# Patient Record
Sex: FEMALE | Employment: OTHER | URBAN - NONMETROPOLITAN AREA
[De-identification: names, ages, dates, MRNs, and addresses within clinical notes are randomized per-mention and may not be internally consistent; named-entity substitution may affect disease eponyms.]

---

## 2017-12-07 ENCOUNTER — DOCTOR'S OFFICE (OUTPATIENT)
Dept: URBAN - NONMETROPOLITAN AREA CLINIC 1 | Facility: CLINIC | Age: 42
Setting detail: OPHTHALMOLOGY
End: 2017-12-07
Payer: COMMERCIAL

## 2017-12-07 ENCOUNTER — OPTICAL OFFICE (OUTPATIENT)
Dept: URBAN - NONMETROPOLITAN AREA CLINIC 4 | Facility: CLINIC | Age: 42
Setting detail: OPHTHALMOLOGY
End: 2017-12-07
Payer: COMMERCIAL

## 2017-12-07 ENCOUNTER — RX ONLY (RX ONLY)
Age: 42
End: 2017-12-07

## 2017-12-07 DIAGNOSIS — H52.4: ICD-10-CM

## 2017-12-07 DIAGNOSIS — H52.223: ICD-10-CM

## 2017-12-07 PROCEDURE — 92014 COMPRE OPH EXAM EST PT 1/>: CPT | Performed by: OPTOMETRIST

## 2017-12-07 PROCEDURE — V2202 LENS SPHERE BIFOCAL 7.12-20.: HCPCS | Performed by: OPTOMETRIST

## 2017-12-07 PROCEDURE — V2020 VISION SVCS FRAMES PURCHASES: HCPCS | Performed by: OPTOMETRIST

## 2017-12-07 PROCEDURE — 92015 DETERMINE REFRACTIVE STATE: CPT | Performed by: OPTOMETRIST

## 2017-12-07 PROCEDURE — V2784 LENS POLYCARB OR EQUAL: HCPCS | Performed by: OPTOMETRIST

## 2017-12-07 ASSESSMENT — REFRACTION_CURRENTRX
OS_OVR_VA: 20/
OD_OVR_VA: 20/
OD_OVR_VA: 20/
OS_OVR_VA: 20/
OD_OVR_VA: 20/
OS_OVR_VA: 20/

## 2017-12-07 ASSESSMENT — REFRACTION_OUTSIDERX
OU_VA: 20/25
OD_CYLINDER: -0.50
OS_SPHERE: -1.00
OS_VA2: 20/25
OS_VA1: 20/25
OD_ADD: +1.50
OS_AXIS: 135
OD_AXIS: 135
OD_SPHERE: -1.00
OS_ADD: +1.50
OD_VA1: 20/25
OS_CYLINDER: -0.50
OD_VA2: 20/25
OD_VA3: 20/
OS_VA3: 20/

## 2017-12-07 ASSESSMENT — REFRACTION_MANIFEST
OS_VA2: 20/
OU_VA: 20/
OD_VA1: 20/
OD_VA3: 20/
OS_VA2: 20/
OS_VA3: 20/
OD_VA2: 20/
OU_VA: 20/
OS_VA3: 20/
OD_VA2: 20/
OS_VA1: 20/
OD_VA1: 20/
OS_VA1: 20/
OD_VA3: 20/

## 2017-12-07 ASSESSMENT — VISUAL ACUITY
OD_BCVA: 20/70-1
OS_BCVA: 20/80-1

## 2017-12-07 ASSESSMENT — REFRACTION_AUTOREFRACTION
OS_SPHERE: -1.50
OD_SPHERE: -1.50
OS_CYLINDER: -0.50
OS_AXIS: 138
OD_CYLINDER: -0.50
OD_AXIS: 136

## 2017-12-07 ASSESSMENT — CONFRONTATIONAL VISUAL FIELD TEST (CVF)
OS_FINDINGS: FULL
OD_FINDINGS: FULL

## 2017-12-07 ASSESSMENT — SPHEQUIV_DERIVED
OS_SPHEQUIV: -1.75
OD_SPHEQUIV: -1.75

## 2020-09-23 ENCOUNTER — DOCTOR'S OFFICE (OUTPATIENT)
Dept: URBAN - NONMETROPOLITAN AREA CLINIC 1 | Facility: CLINIC | Age: 45
Setting detail: OPHTHALMOLOGY
End: 2020-09-23
Payer: COMMERCIAL

## 2020-09-23 VITALS — HEIGHT: 55 IN

## 2020-09-23 DIAGNOSIS — H52.4: ICD-10-CM

## 2020-09-23 DIAGNOSIS — E11.3293: ICD-10-CM

## 2020-09-23 PROCEDURE — 92134 CPTRZ OPH DX IMG PST SGM RTA: CPT | Performed by: OPTOMETRIST

## 2020-09-23 PROCEDURE — 92014 COMPRE OPH EXAM EST PT 1/>: CPT | Performed by: OPTOMETRIST

## 2020-09-23 ASSESSMENT — CONFRONTATIONAL VISUAL FIELD TEST (CVF)
OD_FINDINGS: FULL
OS_FINDINGS: FULL

## 2020-09-24 ASSESSMENT — REFRACTION_MANIFEST
OS_ADD: +1.50
OS_CYLINDER: -0.50
OD_AXIS: 135
OD_VA2: 20/25
OS_AXIS: 135
OD_ADD: +1.50
OD_CYLINDER: -0.50
OS_VA1: 20/25
OS_SPHERE: -1.00
OS_VA2: 20/25
OU_VA: 20/25
OD_SPHERE: -1.00
OD_VA1: 20/25

## 2020-09-24 ASSESSMENT — REFRACTION_CURRENTRX
OD_AXIS: 138
OS_VPRISM_DIRECTION: BF
OS_SPHERE: -1.00
OS_OVR_VA: 20/
OS_CYLINDER: -0.50
OD_VPRISM_DIRECTION: BF
OD_OVR_VA: 20/
OD_ADD: +1.75
OD_CYLINDER: -0.50
OS_AXIS: 135
OD_SPHERE: -1.00
OS_ADD: +1.75

## 2020-09-24 ASSESSMENT — SPHEQUIV_DERIVED
OD_SPHEQUIV: -1.25
OS_SPHEQUIV: -1.25
OS_SPHEQUIV: -1.75
OD_SPHEQUIV: -1.25

## 2020-09-24 ASSESSMENT — REFRACTION_AUTOREFRACTION
OD_CYLINDER: -1.00
OS_AXIS: 138
OD_SPHERE: -0.75
OS_SPHERE: -1.50
OS_CYLINDER: -0.50
OD_AXIS: 006

## 2020-09-24 ASSESSMENT — VISUAL ACUITY
OD_BCVA: 20/40-1
OS_BCVA: 20/100-1

## 2020-12-23 ENCOUNTER — OPTICAL OFFICE (OUTPATIENT)
Dept: URBAN - NONMETROPOLITAN AREA CLINIC 4 | Facility: CLINIC | Age: 45
Setting detail: OPHTHALMOLOGY
End: 2020-12-23
Payer: COMMERCIAL

## 2020-12-23 ENCOUNTER — DOCTOR'S OFFICE (OUTPATIENT)
Dept: URBAN - NONMETROPOLITAN AREA CLINIC 1 | Facility: CLINIC | Age: 45
Setting detail: OPHTHALMOLOGY
End: 2020-12-23
Payer: COMMERCIAL

## 2020-12-23 VITALS — HEIGHT: 55 IN

## 2020-12-23 DIAGNOSIS — H52.223: ICD-10-CM

## 2020-12-23 DIAGNOSIS — H52.4: ICD-10-CM

## 2020-12-23 PROCEDURE — V2784 LENS POLYCARB OR EQUAL: HCPCS | Performed by: OPTOMETRIST

## 2020-12-23 PROCEDURE — V2203 LENS SPHCYL BIFOCAL 4.00D/.1: HCPCS | Performed by: OPTOMETRIST

## 2020-12-23 PROCEDURE — V2020 VISION SVCS FRAMES PURCHASES: HCPCS | Performed by: OPTOMETRIST

## 2020-12-23 PROCEDURE — 92015 DETERMINE REFRACTIVE STATE: CPT | Performed by: OPTOMETRIST

## 2020-12-23 PROCEDURE — V2202 LENS SPHERE BIFOCAL 7.12-20.: HCPCS | Performed by: OPTOMETRIST

## 2020-12-23 ASSESSMENT — REFRACTION_MANIFEST
OS_CYLINDER: -0.50
OD_ADD: +2.00
OS_AXIS: 015
OD_CYLINDER: -0.50
OS_ADD: +2.00
OU_VA: 20/25
OS_SPHERE: -0.75
OS_VA1: 20/30
OD_VA1: 20/40
OD_SPHERE: -1.00
OD_AXIS: 045
OS_VA2: 20/30
OD_VA2: 20/40

## 2020-12-23 ASSESSMENT — REFRACTION_CURRENTRX
OS_OVR_VA: 20/
OS_ADD: +1.75
OS_VPRISM_DIRECTION: BF
OD_ADD: +1.75
OD_AXIS: 138
OD_OVR_VA: 20/
OS_SPHERE: -1.00
OS_CYLINDER: -0.50
OD_VPRISM_DIRECTION: BF
OD_SPHERE: -1.00
OD_CYLINDER: -0.50
OS_AXIS: 135

## 2020-12-23 ASSESSMENT — VISUAL ACUITY
OS_BCVA: 20/100-1
OD_BCVA: 20/40-1

## 2020-12-23 ASSESSMENT — SPHEQUIV_DERIVED
OS_SPHEQUIV: -0.5
OS_SPHEQUIV: -1
OD_SPHEQUIV: -1.25
OD_SPHEQUIV: -1

## 2020-12-23 ASSESSMENT — REFRACTION_AUTOREFRACTION
OS_SPHERE: -0.25
OD_CYLINDER: -1.00
OD_SPHERE: -0.50
OD_AXIS: 067
OS_AXIS: 013
OS_CYLINDER: -0.50

## 2021-03-23 ENCOUNTER — DOCTOR'S OFFICE (OUTPATIENT)
Dept: URBAN - NONMETROPOLITAN AREA CLINIC 1 | Facility: CLINIC | Age: 46
Setting detail: OPHTHALMOLOGY
End: 2021-03-23
Payer: COMMERCIAL

## 2021-03-23 VITALS — HEIGHT: 55 IN

## 2021-03-23 DIAGNOSIS — E11.3293: ICD-10-CM

## 2021-03-23 PROCEDURE — 92134 CPTRZ OPH DX IMG PST SGM RTA: CPT | Performed by: OPTOMETRIST

## 2021-03-23 PROCEDURE — 92014 COMPRE OPH EXAM EST PT 1/>: CPT | Performed by: OPTOMETRIST

## 2021-03-23 ASSESSMENT — REFRACTION_MANIFEST
OS_ADD: +2.00
OD_VA1: 20/40
OS_VA2: 20/30
OS_AXIS: 015
OD_VA2: 20/40
OU_VA: 20/25
OD_SPHERE: -1.00
OS_CYLINDER: -0.50
OD_ADD: +2.00
OD_CYLINDER: -0.50
OD_AXIS: 045
OS_VA1: 20/30
OS_SPHERE: -0.75

## 2021-03-23 ASSESSMENT — REFRACTION_CURRENTRX
OS_AXIS: 135
OD_OVR_VA: 20/
OS_CYLINDER: -0.50
OD_ADD: +2.00
OS_ADD: +2.00
OD_AXIS: 138
OS_OVR_VA: 20/
OS_SPHERE: -0.50
OD_CYLINDER: -0.50
OD_SPHERE: -1.00
OD_VPRISM_DIRECTION: BF
OS_VPRISM_DIRECTION: BF

## 2021-03-23 ASSESSMENT — REFRACTION_AUTOREFRACTION
OS_AXIS: 005
OD_SPHERE: -0.75
OS_CYLINDER: -1.00
OD_AXIS: 146
OS_SPHERE: -0.25
OD_CYLINDER: -0.75

## 2021-03-23 ASSESSMENT — VISUAL ACUITY
OD_BCVA: 20/50
OS_BCVA: 20/80-1

## 2021-03-23 ASSESSMENT — SPHEQUIV_DERIVED
OS_SPHEQUIV: -0.75
OD_SPHEQUIV: -1.125
OS_SPHEQUIV: -1
OD_SPHEQUIV: -1.25

## 2021-03-23 ASSESSMENT — CONFRONTATIONAL VISUAL FIELD TEST (CVF)
OD_FINDINGS: FULL
OS_FINDINGS: FULL

## 2021-09-24 ENCOUNTER — DOCTOR'S OFFICE (OUTPATIENT)
Dept: URBAN - NONMETROPOLITAN AREA CLINIC 1 | Facility: CLINIC | Age: 46
Setting detail: OPHTHALMOLOGY
End: 2021-09-24
Payer: COMMERCIAL

## 2021-09-24 VITALS — HEIGHT: 55 IN

## 2021-09-24 DIAGNOSIS — E11.3293: ICD-10-CM

## 2021-09-24 DIAGNOSIS — H25.013: ICD-10-CM

## 2021-09-24 PROBLEM — H52.4 MYOPIA OU WITH PRESBYOPIA ; BOTH EYES: Status: ACTIVE | Noted: 2017-12-07

## 2021-09-24 PROCEDURE — 92014 COMPRE OPH EXAM EST PT 1/>: CPT | Performed by: OPTOMETRIST

## 2021-09-24 PROCEDURE — 92134 CPTRZ OPH DX IMG PST SGM RTA: CPT | Performed by: OPTOMETRIST

## 2021-09-24 ASSESSMENT — VISUAL ACUITY
OS_BCVA: 20/40
OD_BCVA: 20/30

## 2021-09-24 ASSESSMENT — REFRACTION_CURRENTRX
OS_AXIS: 135
OD_ADD: +2.00
OS_VPRISM_DIRECTION: BF
OD_SPHERE: -1.00
OD_VPRISM_DIRECTION: BF
OD_OVR_VA: 20/
OD_AXIS: 138
OS_ADD: +2.00
OD_CYLINDER: -0.50
OS_CYLINDER: -0.50
OS_SPHERE: -0.50
OS_OVR_VA: 20/

## 2021-09-24 ASSESSMENT — REFRACTION_MANIFEST
OD_SPHERE: -1.00
OS_CYLINDER: -0.50
OS_ADD: +2.00
OD_ADD: +2.00
OS_AXIS: 015
OD_AXIS: 045
OD_VA2: 20/40
OU_VA: 20/25
OD_VA1: 20/40
OS_VA1: 20/30
OD_CYLINDER: -0.50
OS_VA2: 20/30
OS_SPHERE: -0.75

## 2021-09-24 ASSESSMENT — CONFRONTATIONAL VISUAL FIELD TEST (CVF)
OS_FINDINGS: FULL
OD_FINDINGS: FULL

## 2021-09-24 ASSESSMENT — SPHEQUIV_DERIVED
OD_SPHEQUIV: -1.5
OD_SPHEQUIV: -1.25
OS_SPHEQUIV: -1.375
OS_SPHEQUIV: -1

## 2021-09-24 ASSESSMENT — REFRACTION_AUTOREFRACTION
OS_AXIS: 178
OD_AXIS: 172
OS_SPHERE: -1.00
OD_SPHERE: -1.00
OS_CYLINDER: -0.75
OD_CYLINDER: -1.00

## 2021-09-24 ASSESSMENT — TONOMETRY
OS_IOP_MMHG: 16
OD_IOP_MMHG: 16

## 2023-01-03 PROBLEM — R73.03 PRE-DIABETES: Status: ACTIVE | Noted: 2017-12-14

## 2023-01-03 PROBLEM — Z86.19 HEPATITIS C VIRUS INFECTION CURED AFTER ANTIVIRAL DRUG THERAPY: Status: ACTIVE | Noted: 2021-05-12

## 2023-01-03 PROBLEM — F20.9 SCHIZOPHRENIA (HCC): Status: ACTIVE | Noted: 2022-07-13

## 2023-01-03 PROBLEM — E66.01 SEVERE OBESITY WITH BODY MASS INDEX (BMI) OF 35.0 TO 39.9 WITH SERIOUS COMORBIDITY (HCC): Status: ACTIVE | Noted: 2019-01-07

## 2023-01-03 PROBLEM — N17.9 ACUTE KIDNEY INJURY (HCC): Status: ACTIVE | Noted: 2022-05-31

## 2023-01-07 ENCOUNTER — HOSPITAL ENCOUNTER (EMERGENCY)
Facility: HOSPITAL | Age: 48
Discharge: HOME | End: 2023-01-08
Attending: EMERGENCY MEDICINE

## 2023-01-07 DIAGNOSIS — F10.929 ALCOHOL INTOXICATION (HCC): ICD-10-CM

## 2023-01-07 DIAGNOSIS — E87.1 HYPONATREMIA: ICD-10-CM

## 2023-01-07 DIAGNOSIS — R10.9 ABDOMINAL PAIN: ICD-10-CM

## 2023-01-07 DIAGNOSIS — F10.10 ALCOHOL ABUSE: Primary | ICD-10-CM

## 2023-01-07 LAB
ALBUMIN SERPL BCP-MCNC: 3.8 G/DL (ref 3.5–5)
ALP SERPL-CCNC: 130 U/L (ref 34–104)
ALT SERPL W P-5'-P-CCNC: 7 U/L (ref 7–52)
ANION GAP SERPL CALCULATED.3IONS-SCNC: 11 MMOL/L (ref 4–13)
AST SERPL W P-5'-P-CCNC: 14 U/L (ref 13–39)
BASOPHILS # BLD AUTO: 0.14 THOUSANDS/ÂΜL (ref 0–0.1)
BASOPHILS NFR BLD AUTO: 1 % (ref 0–1)
BILIRUB SERPL-MCNC: 0.3 MG/DL (ref 0.2–1)
BUN SERPL-MCNC: 14 MG/DL (ref 5–25)
CALCIUM SERPL-MCNC: 8.8 MG/DL (ref 8.4–10.2)
CHLORIDE SERPL-SCNC: 97 MMOL/L (ref 96–108)
CO2 SERPL-SCNC: 20 MMOL/L (ref 21–32)
CREAT SERPL-MCNC: 1.05 MG/DL (ref 0.6–1.3)
EOSINOPHIL # BLD AUTO: 0.27 THOUSAND/ÂΜL (ref 0–0.61)
EOSINOPHIL NFR BLD AUTO: 2 % (ref 0–6)
ERYTHROCYTE [DISTWIDTH] IN BLOOD BY AUTOMATED COUNT: 14.4 % (ref 11.6–15.1)
ETHANOL SERPL-MCNC: 120 MG/DL
ETHANOL SERPL-MCNC: 289 MG/DL
ETHANOL SERPL-MCNC: <10 MG/DL
GFR SERPL CREATININE-BSD FRML MDRD: 62 ML/MIN/1.73SQ M
GLUCOSE SERPL-MCNC: 140 MG/DL (ref 65–140)
HCT VFR BLD AUTO: 35 % (ref 34.8–46.1)
HGB BLD-MCNC: 11.5 G/DL (ref 11.5–15.4)
IMM GRANULOCYTES # BLD AUTO: 0.03 THOUSAND/UL (ref 0–0.2)
IMM GRANULOCYTES NFR BLD AUTO: 0 % (ref 0–2)
LIPASE SERPL-CCNC: 31 U/L (ref 11–82)
LYMPHOCYTES # BLD AUTO: 4.07 THOUSANDS/ÂΜL (ref 0.6–4.47)
LYMPHOCYTES NFR BLD AUTO: 34 % (ref 14–44)
MCH RBC QN AUTO: 28.2 PG (ref 26.8–34.3)
MCHC RBC AUTO-ENTMCNC: 32.9 G/DL (ref 31.4–37.4)
MCV RBC AUTO: 86 FL (ref 82–98)
MONOCYTES # BLD AUTO: 0.85 THOUSAND/ÂΜL (ref 0.17–1.22)
MONOCYTES NFR BLD AUTO: 7 % (ref 4–12)
NEUTROPHILS # BLD AUTO: 6.52 THOUSANDS/ÂΜL (ref 1.85–7.62)
NEUTS SEG NFR BLD AUTO: 56 % (ref 43–75)
NRBC BLD AUTO-RTO: 0 /100 WBCS
PLATELET # BLD AUTO: 384 THOUSANDS/UL (ref 149–390)
PMV BLD AUTO: 11.1 FL (ref 8.9–12.7)
POTASSIUM SERPL-SCNC: 3.5 MMOL/L (ref 3.5–5.3)
PROT SERPL-MCNC: 6.8 G/DL (ref 6.4–8.4)
RBC # BLD AUTO: 4.08 MILLION/UL (ref 3.81–5.12)
SODIUM SERPL-SCNC: 128 MMOL/L (ref 135–147)
WBC # BLD AUTO: 11.88 THOUSAND/UL (ref 4.31–10.16)

## 2023-01-07 RX ORDER — SODIUM CHLORIDE 9 MG/ML
125 INJECTION, SOLUTION INTRAVENOUS CONTINUOUS
Status: DISCONTINUED | OUTPATIENT
Start: 2023-01-07 | End: 2023-01-07

## 2023-01-07 RX ADMIN — SODIUM CHLORIDE 125 ML/HR: 0.9 INJECTION, SOLUTION INTRAVENOUS at 02:11

## 2023-01-07 NOTE — ED NOTES
Crisis called [facility] [phone] requesting inpatient Rehab for pt  Spoke with Akira LUU who stated there are open beds available and to send an email requesting rehab for pt  Email sent while talking with Debora Cazares confirmed receiving email  Will review with team and follow up with pt while in ED

## 2023-01-07 NOTE — ED NOTES
Crisis met with pt to complete Crisis Intake and Safety Risk Assessment  Pt is a 50year old female with a history of Bipolar and Depression who presents in the emergency room via EMS  Pt stated stated she drank 8 cans of beer due to depression, and is known to be alcoholic  Pt stated she normally drinks a case of beer per day  She denies taking any psychotropic medications, and said she does not see a psychiatrist   She lives with her boyfriend who supports her financially  She said she was given monthly disability checks but no longer receives them  She denies inpatient mental health treatment, denies SI/HI/AVH or thoughts to self harm  Pt states last rehab admission was a few months ago at [facility]  Crisis and pt discussed treatment options  Pt is agreeable to go to rehab

## 2023-01-07 NOTE — ED NOTES
Advised Jory Reeder, that patient is at legal alcohol limit for evaluation       Parisa Pena RN  01/07/23 7706

## 2023-01-07 NOTE — ED PROVIDER NOTES
History  Chief Complaint   Patient presents with   • Detox Evaluation     Pt drank 8 cans of beer  Drinks daily, wants rehab     26-year-old female with a history of alcohol abuse presents via EMS for evaluation of abdominal pain  Patient states she normally drinks a case of beer per day  She states tonight she drank 8 cans of beer and started with some abdominal pain  The abdominal pain is diffuse in nature and associated with nausea and vomiting  She states she typically gets abdominal pain when she drinks  Denies any prior history of any abdominal surgeries  No headache, fever, chills, chest pain, cough, or shortness of breath  No urinary symptoms  Patient states she may be interested in alcohol rehab  Patient does appear to be slightly intoxicated at this time  No reported trauma or falls  None       History reviewed  No pertinent past medical history  History reviewed  No pertinent surgical history  History reviewed  No pertinent family history  I have reviewed and agree with the history as documented  E-Cigarette/Vaping     E-Cigarette/Vaping Substances     Social History     Tobacco Use   • Smoking status: Every Day     Packs/day: 0 25     Years: 30 00     Pack years: 7 50     Types: Cigarettes   Substance Use Topics   • Alcohol use: Yes     Alcohol/week: 40 0 standard drinks     Types: 40 Cans of beer per week   • Drug use: Never       Review of Systems   Constitutional: Negative for chills and fever  HENT: Negative for ear pain and sore throat  Eyes: Negative for pain and visual disturbance  Respiratory: Negative for cough and shortness of breath  Cardiovascular: Negative for chest pain and palpitations  Gastrointestinal: Positive for abdominal pain, nausea and vomiting  Genitourinary: Negative for dysuria and hematuria  Musculoskeletal: Negative for arthralgias and back pain  Skin: Negative for color change and rash     Neurological: Negative for seizures and syncope  All other systems reviewed and are negative  Physical Exam  Physical Exam  Vitals and nursing note reviewed  Constitutional:       General: She is not in acute distress  Appearance: She is well-developed  HENT:      Head: Normocephalic and atraumatic  Right Ear: External ear normal       Left Ear: External ear normal       Nose: Nose normal       Mouth/Throat:      Mouth: Mucous membranes are moist    Eyes:      Extraocular Movements: Extraocular movements intact  Conjunctiva/sclera: Conjunctivae normal       Pupils: Pupils are equal, round, and reactive to light  Cardiovascular:      Rate and Rhythm: Normal rate and regular rhythm  Heart sounds: No murmur heard  Pulmonary:      Effort: Pulmonary effort is normal  No respiratory distress  Breath sounds: Normal breath sounds  Abdominal:      General: Abdomen is flat  Bowel sounds are normal       Palpations: Abdomen is soft  Tenderness: There is no abdominal tenderness  There is no guarding or rebound  Musculoskeletal:         General: No swelling or deformity  Normal range of motion  Cervical back: Normal range of motion and neck supple  Skin:     General: Skin is warm and dry  Capillary Refill: Capillary refill takes less than 2 seconds  Neurological:      General: No focal deficit present  Mental Status: She is alert and oriented to person, place, and time     Psychiatric:         Mood and Affect: Mood normal          Behavior: Behavior normal          Vital Signs  ED Triage Vitals   Temperature Pulse Respirations Blood Pressure SpO2   01/07/23 0210 01/07/23 0157 01/07/23 0157 01/07/23 0157 01/07/23 0157   (!) 96 7 °F (35 9 °C) 102 19 (!) 162/102 97 %      Temp Source Heart Rate Source Patient Position - Orthostatic VS BP Location FiO2 (%)   01/07/23 0210 01/07/23 0157 01/07/23 0157 01/07/23 0157 --   Tympanic Monitor Lying Left arm       Pain Score       01/07/23 0157       10 - Worst Possible Pain           Vitals:    01/07/23 0157 01/07/23 0200 01/07/23 0400 01/07/23 0430   BP: (!) 162/102 (!) 162/102 120/73 112/86   Pulse: 102 99 99 100   Patient Position - Orthostatic VS: Lying Lying Lying Lying         Visual Acuity      ED Medications  Medications   sodium chloride 0 9 % infusion (125 mL/hr Intravenous New Bag 1/7/23 0211)       Diagnostic Studies  Results Reviewed     Procedure Component Value Units Date/Time    Ethanol [[id number]]  (Abnormal) Collected: 01/07/23 0213    Lab Status: Final result Specimen: Blood from Arm, Right Updated: 01/07/23 0239     Ethanol Lvl 289 mg/dL     Comprehensive metabolic panel [[id number]]  (Abnormal) Collected: 01/07/23 0213    Lab Status: Final result Specimen: Blood from Arm, Right Updated: 01/07/23 0237     Sodium 128 mmol/L      Potassium 3 5 mmol/L      Chloride 97 mmol/L      CO2 20 mmol/L      ANION GAP 11 mmol/L      BUN 14 mg/dL      Creatinine 1 05 mg/dL      Glucose 140 mg/dL      Calcium 8 8 mg/dL      AST 14 U/L      ALT 7 U/L      Alkaline Phosphatase 130 U/L      Total Protein 6 8 g/dL      Albumin 3 8 g/dL      Total Bilirubin 0 30 mg/dL      eGFR 62 ml/min/1 73sq m     Narrative:      [place] guidelines for Chronic Kidney Disease (CKD):   •  Stage 1 with normal or high GFR (GFR > 90 mL/min/1 73 square meters)  •  Stage 2 Mild CKD (GFR = 60-89 mL/min/1 73 square meters)  •  Stage 3A Moderate CKD (GFR = 45-59 mL/min/1 73 square meters)  •  Stage 3B Moderate CKD (GFR = 30-44 mL/min/1 73 square meters)  •  Stage 4 Severe CKD (GFR = 15-29 mL/min/1 73 square meters)  •  Stage 5 End Stage CKD (GFR <15 mL/min/1 73 square meters)  Note: GFR calculation is accurate only with a steady state creatinine    Lipase [[id number]]  (Normal) Collected: 01/07/23 0213    Lab Status: Final result Specimen: Blood from Arm, Right Updated: 01/07/23 0237     Lipase 31 u/L     CBC and differential [[id number]]  (Abnormal) Collected: 01/07/23 7797    Lab Status: Final result Specimen: Blood from Arm, Right Updated: 01/07/23 0217     WBC 11 88 Thousand/uL      RBC 4 08 Million/uL      Hemoglobin 11 5 g/dL      Hematocrit 35 0 %      MCV 86 fL      MCH 28 2 pg      MCHC 32 9 g/dL      RDW 14 4 %      MPV 11 1 fL      Platelets 718 Thousands/uL      nRBC 0 /100 WBCs      Neutrophils Relative 56 %      Immat GRANS % 0 %      Lymphocytes Relative 34 %      Monocytes Relative 7 %      Eosinophils Relative 2 %      Basophils Relative 1 %      Neutrophils Absolute 6 52 Thousands/µL      Immature Grans Absolute 0 03 Thousand/uL      Lymphocytes Absolute 4 07 Thousands/µL      Monocytes Absolute 0 85 Thousand/µL      Eosinophils Absolute 0 27 Thousand/µL      Basophils Absolute 0 14 Thousands/µL                  No orders to display              Procedures  Procedures         ED Course  ED Course as of 01/07/23 0649   Sat Jan 07, 2023   0242 MEDICAL ALCOHOL(!): 289                               SBIRT 22yo+    Flowsheet Row Most Recent Value   SBIRT (25 yo +)    In order to provide better care to our patients, we are screening all of our patients for alcohol and drug use  Would it be okay to ask you these screening questions? Yes Filed at: 01/07/2023 0206   Initial Alcohol Screen: US AUDIT-C     1  How often do you have a drink containing alcohol? 6 Filed at: 01/07/2023 0206   2  How many drinks containing alcohol do you have on a typical day you are drinking? 6 Filed at: 01/07/2023 0206   3b  FEMALE Any Age, or MALE 65+: How often do you have 4 or more drinks on one occassion? 6 Filed at: 01/07/2023 0206   Audit-C Score 18 Filed at: 01/07/2023 0206   Full Alcohol Screen: US AUDIT    4  How often during the last year have you found that you were not able to stop drinking once you had started? 4 Filed at: 01/07/2023 0206   5  How often during past year have you failed to do what was normally expected of you because of drinking? 4 Filed at: 01/07/2023 0206   6   How often in past year have you needed a first drink in the morning to get yourself going after a heavy drinking session? 0 Filed at: 01/07/2023 0206   7  How often in past year have you had feeling of guilt or remorse after drinking? 4 Filed at: 01/07/2023 0206   8  How often in past year have you been unable to remember what happened night before because you had been drinking? 2 Filed at: 01/07/2023 0206   9  Have you or someone else been injured as a result of your drinking? 0 Filed at: 01/07/2023 0206   10  Has a relative, friend, doctor or other health worker been concerned about your drinking and suggested you cut down? 4 Filed at: 01/07/2023 0206   AUDIT Total Score 36 Filed at: 01/07/2023 0206   LUANN: How many times in the past year have you    Used an illegal drug or used a prescription medication for non-medical reasons? Never Filed at: 01/07/2023 0206                    Medical Decision Making  38-year-old female presents the emergency department intoxicated for evaluation of abdominal pain  On exam, she is resting comfortably in bed in no acute distress  Abdomen is soft and nontender without rebound or guarding  Suspect symptoms of abdominal pain likely secondary to alcohol intoxication and possible alcohol induced gastritis  Will check abdominal labs, do not believe any imaging is necessary at this time given reassuring exam     Labs demonstrate hyponatremia, mild leukocytosis which I suspect is secondary to the alcohol intoxication and vomiting  Ethanol level significantly elevated  Will give fluids and continue to monitor clinically until sober  Patient signed out to Dr Kala Padilla pending sobriety, repeat evaluation, and crisis consult    Abdominal pain: acute illness or injury  Alcohol abuse: self-limited or minor problem  Alcohol intoxication ([facility] [address] ): acute illness or injury  Hyponatremia: acute illness or injury  Amount and/or Complexity of Data Reviewed  Labs: ordered   Decision-making details documented in ED Course  Risk  Prescription drug management  Disposition  Final diagnoses:   Alcohol abuse   Alcohol intoxication ([facility] [address] )   Abdominal pain   Hyponatremia     Time reflects when diagnosis was documented in both MDM as applicable and the Disposition within this note     Time User Action Codes Description Comment    1/7/2023  3:23 AM Check, Jackson Escobar Add [F10 10] Alcohol abuse     1/7/2023  3:23 AM Check, Jackson Escobar Add [F10 929] Alcohol intoxication ([facility] [address] )     1/7/2023  3:23 AM Check, Jackson Escobar Add [R10 9] Abdominal pain     1/7/2023  3:23 AM Check, Jackson Escobar Add [E87 1] Hyponatremia       ED Disposition     None      Follow-up Information    None         Patient's Medications    No medications on file       No discharge procedures on file      PDMP Review     None          ED Provider  Electronically Signed by           Edy Beebe MD  01/07/23 Jose Abreu MD  01/07/23 7093

## 2023-01-08 VITALS
BODY MASS INDEX: 32.58 KG/M2 | HEART RATE: 84 BPM | SYSTOLIC BLOOD PRESSURE: 139 MMHG | DIASTOLIC BLOOD PRESSURE: 85 MMHG | OXYGEN SATURATION: 97 % | WEIGHT: 220 LBS | TEMPERATURE: 98.2 F | HEIGHT: 69 IN | RESPIRATION RATE: 17 BRPM

## 2023-01-08 LAB
EST. AVERAGE GLUCOSE BLD GHB EST-MCNC: 117 MG/DL
HBA1C MFR BLD: 5.7 %

## 2023-01-08 NOTE — ED CARE HANDOFF
Emergency Department Sign Out Note        Sign out and transfer of care from Dr Marquis Kay See Separate Emergency Department note  The patient, Neftaly Villanueva, was evaluated by the previous provider for alcohol abuse and intoxication  Workup Completed:  CBC, CMP, lipase, ethanol    ED Course / Workup Pending (followup): Patient's labs are remarkable for mild hyponatremia  Patient interested in alcohol rehab  Hemoglobin A1c was ordered by previous provider per request of possible excepting facility  Hemoglobin A1c came back at 5 7 which is stable from prior  Patient pending possible placement  No issues overnight                                  ED Course as of 01/08/23 0432   Sat Jan 07, 2023   9740 MEDICAL ALCOHOL(!): 289   Sun Jan 08, 2023   0430 Hemoglobin A1C(!): 5 7     Procedures  MDM        Disposition  Final diagnoses:   Alcohol abuse   Alcohol intoxication ([facility] [address] )   Abdominal pain   Hyponatremia     Time reflects when diagnosis was documented in both MDM as applicable and the Disposition within this note     Time User Action Codes Description Comment    1/7/2023  3:23 AM Check, Wright Cobble Add [F10 10] Alcohol abuse     1/7/2023  3:23 AM Check, Wright Cobble Add [F10 929] Alcohol intoxication ([facility] [address] )     1/7/2023  3:23 AM Check, Wright Cobble Add [R10 9] Abdominal pain     1/7/2023  3:23 AM Check, Wright Cobble Add [E87 1] Hyponatremia       ED Disposition     None      Follow-up Information    None       Patient's Medications    No medications on file     No discharge procedures on file         ED Provider  Electronically Signed by     Heydi Harrington MD  01/08/23 0645

## 2023-01-08 NOTE — ED NOTES
Maryam Suicide Risk Assessment deferred, as unable to assess while patient sleeping  Behavioral Health Assessment deferred as patient is sleeping and would benefit from additional rest   Vital signs deferred until patient awake, no signs or symptoms of respiratory distress at this time  Once patient is awake and able to participate, will complete assessments         Rosa Durán RN  01/08/23 6339

## 2023-01-08 NOTE — ED NOTES
This writer discussed the patients current presentation and recommended discharge plan with Dr Majano  He agrees with the patient being discharged at this time with referrals and/or information about Detox/Rehab  The patient was Instructed to follow up with her PCP  The writer sent a referral to [facility] requesting detox/rehab for pt  Provided clinicals, including discharge paper work  [facility] will  patient from her home  Laure writer discussed discharge plans with the patient who agrees with and understands the discharge plans

## 2023-01-08 NOTE — ED NOTES
This writer called [facility] for an update regarding open bed  Spoke with Cheyenne Barba who stated they are waiting for pt's A1C level  Pt is not diabetic   Lab results faxed to Cheyenne Barba

## 2023-01-08 NOTE — ED NOTES
1/7/2023 @ 1615: Spoke to AV from [facility] admissions  They are requesting a recent A1C level for the [employer] [facility] Location, and if the patients stomach pain has subsided along with the location of her current medications for the [facility] Location  This writer informed MD and nursing staff of request   This writer also informed AV that the patients stomach pain is still present, and that her daily medications are at home  The patient resides at home with 4 other individuals who are unable to drive  Due to this information the patient was unable to be considered for the [facility] Location  1/7/2023 @ 1920: This writer outreached AV from [facility] and informed them that the patient's A1C level was a sent out and may not result until tomorrow  Admissions informed this writer that they will have to wait for that to result for the reviewing nurse to make the "final determination "  This update was provided to the patient  The patient verbalized understanding  This writer offered to outreach other locations and the patient expressed that she "wants to stay close to home " This writer informed the patient that some rehabs are unfortunately farther away  The patient is OK with staying in the ER overnight until her results come in and are reviewed at [facility]  If they are unable to accommodate, then we will initiate a further bedsearch

## 2023-01-08 NOTE — ED NOTES
This writer faxed discharged paper work to [facility]  Spoke with Raya Both who confirmed receiving faxed

## 2023-01-08 NOTE — ED NOTES
This writer spoke with Jordana Ramsey from [facility] who confirmed she received pt's lab work, and will forward to team for review  Jordana Ramsey also stated that they are able to  patient from her home but will need discharged paper work faxed

## 2023-01-08 NOTE — ED NOTES
This writer met with pt and informed her that discharge paper work were faxed to [facility] who stated they will pick her up from her home sometime today

## 2023-02-13 ENCOUNTER — TELEPHONE (OUTPATIENT)
Dept: ADMINISTRATIVE | Facility: OTHER | Age: 48
End: 2023-02-13

## 2023-02-13 NOTE — TELEPHONE ENCOUNTER
Upon review of the In Basket request we have found as a result of outreach that patient did not have the requested item(s) completed  3/23/21 is an order    Any additional questions or concerns should be emailed to the Practice Liaisons via the appropriate education email address, please do not reply via In Basket      Thank you  Letty Douglas MA

## 2023-02-13 NOTE — TELEPHONE ENCOUNTER
Upon review of the In Basket request we have noted that no result,attached because of this we are requesting that you forward this request/concern to the appropriate education email address  The Quality team members assigned to this email will be more than happy to assist you  Any additional questions or concerns should be emailed to the Practice Liaisons via the appropriate education email address, please do not reply via In Basket      Thank you  Flaco Encinas

## 2023-02-13 NOTE — TELEPHONE ENCOUNTER
----- Message from Carmen Valdez sent at 2/13/2023  7:20 AM EST -----  Regarding: Pap  02/13/23 7:20 AM    Hello, our patient Leslie Laughlin has had Pap Smear (HPV) aka Cervical Cancer Screening completed/performed  Please assist in updating the patient chart by pulling the Care Everywhere (CE) document  The date of service is 1/25/22       Thank you,  Taisha Leos

## 2023-02-13 NOTE — TELEPHONE ENCOUNTER
----- Message from Yumiko Case sent at 2/13/2023  7:21 AM EST -----  Regarding: DM Eye  02/13/23 7:21 AM    Hello, our patient Tre Simms has had Diabetic Eye Exam completed/performed  Please assist in updating the patient chart by pulling the Care Everywhere (CE) document  The date of service is 3/23/2021       Thank you,  Taisha Leos

## 2023-02-15 ENCOUNTER — OFFICE VISIT (OUTPATIENT)
Dept: FAMILY MEDICINE CLINIC | Facility: CLINIC | Age: 48
End: 2023-02-15

## 2023-02-15 ENCOUNTER — APPOINTMENT (OUTPATIENT)
Dept: LAB | Facility: CLINIC | Age: 48
End: 2023-02-15

## 2023-02-15 VITALS
DIASTOLIC BLOOD PRESSURE: 84 MMHG | OXYGEN SATURATION: 98 % | HEIGHT: 69 IN | HEART RATE: 100 BPM | TEMPERATURE: 99.1 F | RESPIRATION RATE: 18 BRPM | WEIGHT: 213.4 LBS | SYSTOLIC BLOOD PRESSURE: 120 MMHG | BODY MASS INDEX: 31.61 KG/M2

## 2023-02-15 DIAGNOSIS — D64.9 ANEMIA, UNSPECIFIED TYPE: ICD-10-CM

## 2023-02-15 DIAGNOSIS — Z23 ENCOUNTER FOR IMMUNIZATION: ICD-10-CM

## 2023-02-15 DIAGNOSIS — F31.32 BIPOLAR 1 DISORDER, DEPRESSED, MODERATE (HCC): ICD-10-CM

## 2023-02-15 DIAGNOSIS — J45.20 MILD INTERMITTENT ASTHMA WITHOUT COMPLICATION: ICD-10-CM

## 2023-02-15 DIAGNOSIS — K21.9 GASTROESOPHAGEAL REFLUX DISEASE, UNSPECIFIED WHETHER ESOPHAGITIS PRESENT: ICD-10-CM

## 2023-02-15 DIAGNOSIS — R73.03 PRE-DIABETES: ICD-10-CM

## 2023-02-15 DIAGNOSIS — E66.09 CLASS 1 OBESITY DUE TO EXCESS CALORIES WITH SERIOUS COMORBIDITY AND BODY MASS INDEX (BMI) OF 31.0 TO 31.9 IN ADULT: ICD-10-CM

## 2023-02-15 DIAGNOSIS — Z12.11 SCREENING FOR COLON CANCER: ICD-10-CM

## 2023-02-15 DIAGNOSIS — Z00.00 ANNUAL PHYSICAL EXAM: Primary | ICD-10-CM

## 2023-02-15 DIAGNOSIS — Z86.19 HEPATITIS C VIRUS INFECTION CURED AFTER ANTIVIRAL DRUG THERAPY: ICD-10-CM

## 2023-02-15 DIAGNOSIS — F10.10 ALCOHOL ABUSE: ICD-10-CM

## 2023-02-15 DIAGNOSIS — I10 HYPERTENSION, UNSPECIFIED TYPE: ICD-10-CM

## 2023-02-15 DIAGNOSIS — Z12.31 ENCOUNTER FOR SCREENING MAMMOGRAM FOR MALIGNANT NEOPLASM OF BREAST: ICD-10-CM

## 2023-02-15 LAB
ALBUMIN SERPL BCP-MCNC: 3.3 G/DL (ref 3.5–5)
ALP SERPL-CCNC: 133 U/L (ref 46–116)
ALT SERPL W P-5'-P-CCNC: 15 U/L (ref 12–78)
ANION GAP SERPL CALCULATED.3IONS-SCNC: 3 MMOL/L (ref 4–13)
AST SERPL W P-5'-P-CCNC: 13 U/L (ref 5–45)
BASOPHILS # BLD AUTO: 0.18 THOUSANDS/ÂΜL (ref 0–0.1)
BASOPHILS NFR BLD AUTO: 3 % (ref 0–1)
BILIRUB SERPL-MCNC: 0.18 MG/DL (ref 0.2–1)
BUN SERPL-MCNC: 12 MG/DL (ref 5–25)
CALCIUM ALBUM COR SERPL-MCNC: 9.9 MG/DL (ref 8.3–10.1)
CALCIUM SERPL-MCNC: 9.3 MG/DL (ref 8.3–10.1)
CHLORIDE SERPL-SCNC: 114 MMOL/L (ref 96–108)
CHOLEST SERPL-MCNC: 180 MG/DL
CO2 SERPL-SCNC: 23 MMOL/L (ref 21–32)
CREAT SERPL-MCNC: 1.11 MG/DL (ref 0.6–1.3)
EOSINOPHIL # BLD AUTO: 0.35 THOUSAND/ÂΜL (ref 0–0.61)
EOSINOPHIL NFR BLD AUTO: 5 % (ref 0–6)
ERYTHROCYTE [DISTWIDTH] IN BLOOD BY AUTOMATED COUNT: 15.2 % (ref 11.6–15.1)
EST. AVERAGE GLUCOSE BLD GHB EST-MCNC: 111 MG/DL
FERRITIN SERPL-MCNC: 10 NG/ML (ref 8–388)
FOLATE SERPL-MCNC: 7.7 NG/ML (ref 3.1–17.5)
GFR SERPL CREATININE-BSD FRML MDRD: 58 ML/MIN/1.73SQ M
GLUCOSE P FAST SERPL-MCNC: 91 MG/DL (ref 65–99)
HBA1C MFR BLD: 5.5 %
HCT VFR BLD AUTO: 38.3 % (ref 34.8–46.1)
HDLC SERPL-MCNC: 48 MG/DL
HGB BLD-MCNC: 11.6 G/DL (ref 11.5–15.4)
IMM GRANULOCYTES # BLD AUTO: 0 THOUSAND/UL (ref 0–0.2)
IMM GRANULOCYTES NFR BLD AUTO: 0 % (ref 0–2)
IRON SATN MFR SERPL: 7 % (ref 15–50)
IRON SERPL-MCNC: 28 UG/DL (ref 50–170)
LDLC SERPL CALC-MCNC: 84 MG/DL (ref 0–100)
LYMPHOCYTES # BLD AUTO: 2.23 THOUSANDS/ÂΜL (ref 0.6–4.47)
LYMPHOCYTES NFR BLD AUTO: 35 % (ref 14–44)
MCH RBC QN AUTO: 26.8 PG (ref 26.8–34.3)
MCHC RBC AUTO-ENTMCNC: 30.3 G/DL (ref 31.4–37.4)
MCV RBC AUTO: 89 FL (ref 82–98)
MONOCYTES # BLD AUTO: 0.64 THOUSAND/ÂΜL (ref 0.17–1.22)
MONOCYTES NFR BLD AUTO: 10 % (ref 4–12)
NEUTROPHILS # BLD AUTO: 3.07 THOUSANDS/ÂΜL (ref 1.85–7.62)
NEUTS SEG NFR BLD AUTO: 47 % (ref 43–75)
NRBC BLD AUTO-RTO: 0 /100 WBCS
PLATELET # BLD AUTO: 303 THOUSANDS/UL (ref 149–390)
PMV BLD AUTO: 12.2 FL (ref 8.9–12.7)
POTASSIUM SERPL-SCNC: 3.8 MMOL/L (ref 3.5–5.3)
PROT SERPL-MCNC: 7.2 G/DL (ref 6.4–8.4)
RBC # BLD AUTO: 4.33 MILLION/UL (ref 3.81–5.12)
SODIUM SERPL-SCNC: 140 MMOL/L (ref 135–147)
TIBC SERPL-MCNC: 393 UG/DL (ref 250–450)
TRIGL SERPL-MCNC: 241 MG/DL
VIT B12 SERPL-MCNC: 388 PG/ML (ref 100–900)
WBC # BLD AUTO: 6.47 THOUSAND/UL (ref 4.31–10.16)

## 2023-02-15 RX ORDER — POTASSIUM CHLORIDE 750 MG/1
10 TABLET, FILM COATED, EXTENDED RELEASE ORAL 2 TIMES DAILY
Status: CANCELLED | OUTPATIENT
Start: 2023-02-15

## 2023-02-15 RX ORDER — DULAGLUTIDE 0.75 MG/.5ML
0.75 INJECTION, SOLUTION SUBCUTANEOUS
Qty: 6 ML | Refills: 3 | Status: SHIPPED | OUTPATIENT
Start: 2023-02-15

## 2023-02-15 RX ORDER — FOLIC ACID 1 MG/1
TABLET ORAL DAILY
COMMUNITY

## 2023-02-15 RX ORDER — ALBUTEROL SULFATE 90 UG/1
2 AEROSOL, METERED RESPIRATORY (INHALATION) EVERY 6 HOURS PRN
Qty: 18 G | Refills: 3 | Status: SHIPPED | OUTPATIENT
Start: 2023-02-15

## 2023-02-15 RX ORDER — DOCUSATE SODIUM 100 MG/1
100 CAPSULE, LIQUID FILLED ORAL 2 TIMES DAILY
Status: CANCELLED | OUTPATIENT
Start: 2023-02-15

## 2023-02-15 RX ORDER — METOPROLOL TARTRATE 50 MG/1
50 TABLET, FILM COATED ORAL EVERY 12 HOURS SCHEDULED
Qty: 180 TABLET | Refills: 1 | Status: SHIPPED | OUTPATIENT
Start: 2023-02-15

## 2023-02-15 RX ORDER — FUROSEMIDE 40 MG/1
TABLET ORAL
COMMUNITY
Start: 2022-10-01

## 2023-02-15 RX ORDER — LANOLIN ALCOHOL/MO/W.PET/CERES
CREAM (GRAM) TOPICAL DAILY
COMMUNITY

## 2023-02-15 RX ORDER — POTASSIUM CHLORIDE 750 MG/1
10 TABLET, FILM COATED, EXTENDED RELEASE ORAL 2 TIMES DAILY
COMMUNITY

## 2023-02-15 RX ORDER — DOCUSATE SODIUM 100 MG/1
100 CAPSULE, LIQUID FILLED ORAL 2 TIMES DAILY
COMMUNITY

## 2023-02-15 RX ORDER — OMEPRAZOLE 20 MG/1
20 CAPSULE, DELAYED RELEASE ORAL DAILY
Qty: 90 CAPSULE | Refills: 1 | Status: SHIPPED | OUTPATIENT
Start: 2023-02-15

## 2023-02-15 RX ORDER — FOLIC ACID 1 MG/1
TABLET ORAL DAILY
Status: CANCELLED | OUTPATIENT
Start: 2023-02-15

## 2023-02-15 RX ORDER — METOPROLOL TARTRATE 50 MG/1
50 TABLET, FILM COATED ORAL EVERY 12 HOURS SCHEDULED
COMMUNITY
End: 2023-02-15 | Stop reason: SDUPTHER

## 2023-02-15 RX ORDER — OMEPRAZOLE 20 MG/1
20 CAPSULE, DELAYED RELEASE ORAL DAILY
COMMUNITY
End: 2023-02-15 | Stop reason: SDUPTHER

## 2023-02-15 RX ORDER — MONTELUKAST SODIUM 10 MG/1
10 TABLET ORAL
Qty: 90 TABLET | Refills: 1 | Status: SHIPPED | OUTPATIENT
Start: 2023-02-15

## 2023-02-15 RX ORDER — DULAGLUTIDE 0.75 MG/.5ML
INJECTION, SOLUTION SUBCUTANEOUS
COMMUNITY
Start: 2023-01-28 | End: 2023-02-15 | Stop reason: SDUPTHER

## 2023-02-15 RX ORDER — MONTELUKAST SODIUM 10 MG/1
10 TABLET ORAL
COMMUNITY
End: 2023-02-15 | Stop reason: SDUPTHER

## 2023-02-15 RX ORDER — LANOLIN ALCOHOL/MO/W.PET/CERES
CREAM (GRAM) TOPICAL DAILY
Status: CANCELLED | OUTPATIENT
Start: 2023-02-15

## 2023-02-15 RX ORDER — FUROSEMIDE 40 MG/1
TABLET ORAL
Status: CANCELLED | OUTPATIENT
Start: 2023-02-15

## 2023-02-15 NOTE — PROGRESS NOTES
[facility]        NAME: Edwardo Ravi is a 50 y o  female  : 1975    MRN: [id number]  DATE: 2023  TIME: 8:48 AM    Assessment and Plan   1  Annual physical exam    2  Screening for colon cancer  - will get records from her last colonoscopy    3  Encounter for screening mammogram for malignant neoplasm of breast  -     Mammo screening bilateral w 3d & cad; Future; Expected date: 02/15/2023    4  Pre-diabetes  -there is possible history of type 2 diabetes vs pre-diabetes    Pt is somewhat poor historian, will check I8W, continue trulicity to help with weight loss  -   Trulicity 1 65 GV/1 5ZI injection; Inject 0 5 mL (0 75 mg total) under the skin every 7 days  -     CBC and differential; Future  -     Comprehensive metabolic panel; Future  -     HEMOGLOBIN A1C W/ EAG ESTIMATION; Future    5  Bipolar 1 disorder, depressed, moderate ([address] )  -currently depressed, no SI/HI or psychotic features, she is not currently on any mood stabilizers or anti-depressants, was following with psych in [place], will need to establish here     -  Ambulatory Referral to Psychiatry; Future  -     CBC and differential; Future  -     Comprehensive metabolic panel; Future    6  Mild intermittent asthma without complication  -needs refills on inhalers, appear very old and possibly , encouraged smoking cessation as well   -    montelukast (SINGULAIR) 10 mg tablet; Take 1 tablet (10 mg total) by mouth daily at bedtime  -     mometasone-formoterol (Dulera) 100-5 MCG/ACT inhaler; Inhale 2 puffs 2 (two) times a day Rinse mouth after use  -     albuterol (ProAir HFA) 90 mcg/act inhaler; Inhale 2 puffs every 6 (six) hours as needed for wheezing or shortness of breath  -     CBC and differential; Future  -     Comprehensive metabolic panel; Future    7   Alcohol abuse  -daily drinking, no DT or withdrawal, discussed health alcohol limits, concern for hepatic steatosis vs cirrhosis given obesity, diabetes and history of HCV    -  CBC and differential; Future  -     Comprehensive metabolic panel; Future    8  Anemia, unspecified type  -she is taking B12 and folate, will check labs     -  CBC and differential; Future  -     Comprehensive metabolic panel; Future  -     Vitamin B12; Future  -     Folate; Future  -     Iron Panel (Includes Ferritin, Iron Sat%, Iron, and TIBC); Future    9  Class 1 obesity due to excess calories with serious comorbidity and body mass index (BMI) of 31 0 to 31 9 in adult  -     Trulicity 6 22 IZ/9 9DQ injection; Inject 0 5 mL (0 75 mg total) under the skin every 7 days  -     CBC and differential; Future  -     Comprehensive metabolic panel; Future  -     Lipid Panel with Direct LDL reflex; Future  -     HEMOGLOBIN A1C W/ EAG ESTIMATION; Future    10  Hepatitis C virus infection cured after antiviral drug therapy  -remote history of intra-nasal drug use, SVR in 2021, denies recent substance abuse other than alcohol and cigarettes   -    CBC and differential; Future  -     Comprehensive metabolic panel; Future  -     Hepatitis C RNA, quantitative, PCR; Future    11  Hypertension, unspecified type  -     metoprolol tartrate (LOPRESSOR) 50 mg tablet; Take 1 tablet (50 mg total) by mouth every 12 (twelve) hours  -     CBC and differential; Future  -     Comprehensive metabolic panel; Future  -     Lipid Panel with Direct LDL reflex; Future  -     HEMOGLOBIN A1C W/ EAG ESTIMATION; Future    12  Gastroesophageal reflux disease, unspecified whether esophagitis present  -     omeprazole (PriLOSEC) 20 mg delayed release capsule; Take 1 capsule (20 mg total) by mouth daily    13   Encounter for immunization  -     FLUBLOK: influenza vaccine, quadrivalent, recombinant, PF, 0 5 mL             Chief Complaint     Chief Complaint   Patient presents with   • Establish Care   • Annual Exam         History of Present Illness       50yo female with bipolar depression HTN, pre-diabetes here to establish care  Recently moved here from [place], now living with her mother in [place] and needs closer PCP  Pre-diabetes: currently on trulicity,     Bipolar depression: feels depressed lately but denies SI/HI or hallucinations, she was seeing psych in [place] but will need to transfer care now that she moved  Not currently on any medications, remote history of hospitalizations  Asthma: using singulair, dulera and albuterol  Smokes cigarettes a few times a week, not daily  Alcohol abuse: drinks daily, at least 40 oz of beer  Denies DTs or withdrawal symptoms  Denies liver disease or cirrhosis but has history of chronic HCV, s/p SVR  Remote history of intra-nasal drug use, none recently  Fhx: notable for CKD and diabetes in her mother, brother also has diabetes  Review of Systems   Review of Systems   Constitutional: Positive for fatigue  Negative for chills, fever and unexpected weight change  HENT: Negative for congestion, postnasal drip, rhinorrhea, sore throat and trouble swallowing  Eyes: Negative for pain, redness, itching and visual disturbance  Respiratory: Positive for shortness of breath  Negative for cough, chest tightness and wheezing  Cardiovascular: Negative for chest pain, palpitations and leg swelling  Gastrointestinal: Negative for abdominal pain, blood in stool, constipation, diarrhea, nausea and vomiting  Endocrine: Negative for cold intolerance, heat intolerance, polydipsia and polyuria  Genitourinary: Negative for dysuria, frequency, hematuria and urgency  Musculoskeletal: Negative for arthralgias, back pain and joint swelling  Skin: Negative for rash  Neurological: Positive for headaches  Negative for dizziness, tremors, weakness, light-headedness and numbness  Psychiatric/Behavioral: Positive for decreased concentration and dysphoric mood  Negative for confusion, hallucinations, sleep disturbance and suicidal ideas   The patient is not nervous/anxious  Current Medications       Current Outpatient Medications:   •  albuterol (ProAir HFA) 90 mcg/act inhaler, Inhale 2 puffs every 6 (six) hours as needed for wheezing or shortness of breath, Disp: 18 g, Rfl: 3  •  docusate sodium (COLACE) 100 mg capsule, Take 100 mg by mouth 2 (two) times a day, Disp: , Rfl:   •  folic acid (FOLVITE) 1 mg tablet, Take by mouth daily, Disp: , Rfl:   •  furosemide (LASIX) 40 mg tablet, TAKE 1 TABLET BY MOUTH EACH MORNING , Disp: , Rfl:   •  metoprolol tartrate (LOPRESSOR) 50 mg tablet, Take 1 tablet (50 mg total) by mouth every 12 (twelve) hours, Disp: 180 tablet, Rfl: 1  •  mometasone-formoterol (Dulera) 100-5 MCG/ACT inhaler, Inhale 2 puffs 2 (two) times a day Rinse mouth after use , Disp: 13 g, Rfl: 3  •  montelukast (SINGULAIR) 10 mg tablet, Take 1 tablet (10 mg total) by mouth daily at bedtime, Disp: 90 tablet, Rfl: 1  •  omeprazole (PriLOSEC) 20 mg delayed release capsule, Take 1 capsule (20 mg total) by mouth daily, Disp: 90 capsule, Rfl: 1  •  potassium chloride (Klor-Con) 10 mEq tablet, Take 10 mEq by mouth 2 (two) times a day, Disp: , Rfl:   •  Trulicity 2 92 OL/2 4GE injection, Inject 0 5 mL (0 75 mg total) under the skin every 7 days, Disp: 6 mL, Rfl: 3  •  vitamin B-12 (VITAMIN B-12) 1,000 mcg tablet, Take by mouth daily, Disp: , Rfl:     Current Allergies     Allergies as of 02/15/2023 - Reviewed 02/15/2023   Allergen Reaction Noted   • Pollen extract Eye Swelling 01/07/2023            The following portions of the patient's history were reviewed and updated as appropriate: allergies, current medications, past family history, past medical history, past social history, past surgical history and problem list      No past medical history on file  History reviewed  No pertinent surgical history  Family History   Problem Relation Age of Onset   • Diabetes Mother    • Diabetes Brother          Medications have been verified          Objective   BP 120/84   Pulse 100   Temp 99 1 °F (37 3 °C)   Resp 18   Ht 5' 9" (1 753 m)   Wt 96 8 kg (213 lb 6 4 oz)   SpO2 98%   BMI 31 51 kg/m²        Physical Exam     Physical Exam  Vitals reviewed  Constitutional:       General: She is not in acute distress  Appearance: She is obese  Comments: Disheveled appearance   HENT:      Head: Normocephalic and atraumatic  Right Ear: Tympanic membrane, ear canal and external ear normal       Left Ear: Tympanic membrane, ear canal and external ear normal       Mouth/Throat:      Pharynx: No posterior oropharyngeal erythema  Eyes:      General: No scleral icterus  Conjunctiva/sclera: Conjunctivae normal       Pupils: Pupils are equal, round, and reactive to light  Cardiovascular:      Rate and Rhythm: Regular rhythm  Tachycardia present  Heart sounds: No murmur heard  No friction rub  Gallop present  Pulmonary:      Effort: Pulmonary effort is normal  No respiratory distress  Breath sounds: No wheezing, rhonchi or rales  Abdominal:      General: Abdomen is flat  Bowel sounds are normal  There is no distension  Palpations: Abdomen is soft  There is no mass  Tenderness: There is no abdominal tenderness  There is no guarding or rebound  Hernia: No hernia is present  Lymphadenopathy:      Cervical: No cervical adenopathy  Skin:     General: Skin is dry  Findings: No rash  Neurological:      General: No focal deficit present  Mental Status: She is alert  Motor: No weakness  Gait: Gait normal    Psychiatric:         Attention and Perception: Attention and perception normal          Mood and Affect: Mood is depressed  Affect is flat  Speech: Speech normal          Behavior: Behavior normal  Behavior is cooperative  Thought Content: Thought content normal          Cognition and Memory: Memory is impaired               Results:  Lab Results   Component Value Date    SODIUM 140 02/15/2023    K 3 8 02/15/2023     (H) 02/15/2023    CO2 23 02/15/2023    BUN 12 02/15/2023    CREATININE 1 11 02/15/2023    GLUC 140 01/07/2023    CALCIUM 9 3 02/15/2023       Lab Results   Component Value Date    HGBA1C 5 5 02/15/2023       Lab Results   Component Value Date    WBC 6 47 02/15/2023    HGB 11 6 02/15/2023    HCT 38 3 02/15/2023    MCV 89 02/15/2023     02/15/2023

## 2023-02-15 NOTE — PATIENT INSTRUCTIONS

## 2023-02-15 NOTE — PROGRESS NOTES
ADULT ANNUAL [facility]    NAME: Alfonso Litten  AGE: 50 y o   SEX: female  : 1975     DATE: 2023     Assessment and Plan:     Problem List Items Addressed This Visit        Respiratory    Mild intermittent asthma without complication    Relevant Medications    montelukast (SINGULAIR) 10 mg tablet    mometasone-formoterol (Dulera) 100-5 MCG/ACT inhaler    albuterol (ProAir HFA) 90 mcg/act inhaler    Other Relevant Orders    CBC and differential (Completed)    Comprehensive metabolic panel (Completed)       Other    Alcohol abuse    Relevant Orders    CBC and differential (Completed)    Comprehensive metabolic panel (Completed)    Anemia    Relevant Medications    vitamin B-12 (VITAMIN B-12) 1,000 mcg tablet    folic acid (FOLVITE) 1 mg tablet    Other Relevant Orders    CBC and differential (Completed)    Comprehensive metabolic panel (Completed)    Vitamin B12 (Completed)    Folate (Completed)    Iron Panel (Includes Ferritin, Iron Sat%, Iron, and TIBC) (Completed)    Bipolar 1 disorder, depressed, moderate ([facility] [address] )    Relevant Orders    Ambulatory Referral to Psychiatry    CBC and differential (Completed)    Comprehensive metabolic panel (Completed)    Hepatitis C virus infection cured after antiviral drug therapy    Relevant Orders    CBC and differential (Completed)    Comprehensive metabolic panel (Completed)    Hepatitis C RNA, quantitative, PCR    Pre-diabetes    Relevant Medications    Trulicity 9 07 XT/5 4YE injection    Other Relevant Orders    CBC and differential (Completed)    Comprehensive metabolic panel (Completed)    HEMOGLOBIN A1C W/ EAG ESTIMATION (Completed)   Other Visit Diagnoses     Annual physical exam    -  Primary    Screening for colon cancer        Encounter for screening mammogram for malignant neoplasm of breast        Relevant Orders    Mammo screening bilateral w 3d & cad    Class 1 obesity due to excess calories with serious comorbidity and body mass index (BMI) of 31 0 to 31 9 in adult        Relevant Medications    Trulicity 1 47 TW/7 4MZ injection    Other Relevant Orders    CBC and differential (Completed)    Comprehensive metabolic panel (Completed)    Lipid Panel with Direct LDL reflex (Completed)    HEMOGLOBIN A1C W/ EAG ESTIMATION (Completed)    Hypertension, unspecified type        Relevant Medications    furosemide (LASIX) 40 mg tablet    metoprolol tartrate (LOPRESSOR) 50 mg tablet    Other Relevant Orders    CBC and differential (Completed)    Comprehensive metabolic panel (Completed)    Lipid Panel with Direct LDL reflex (Completed)    HEMOGLOBIN A1C W/ EAG ESTIMATION (Completed)    Gastroesophageal reflux disease, unspecified whether esophagitis present        Relevant Medications    omeprazole (PriLOSEC) 20 mg delayed release capsule    Encounter for immunization        Relevant Orders    FLUBLOK: influenza vaccine, quadrivalent, recombinant, PF, 0 5 mL (Completed)          Immunizations and preventive care screenings were discussed with patient today  Appropriate education was printed on patient's after visit summary  Counseling:  Alcohol/drug use: discussed moderation in alcohol intake, the recommendations for healthy alcohol use, and avoidance of illicit drug use  Dental Health: discussed importance of regular tooth brushing, flossing, and dental visits  Injury prevention: discussed safety/seat belts, safety helmets, smoke detectors, carbon dioxide detectors, and smoking near bedding or upholstery  Sexual health: discussed sexually transmitted diseases, partner selection, use of condoms, avoidance of unintended pregnancy, and contraceptive alternatives  Exercise: the importance of regular exercise/physical activity was discussed  Recommend exercise 3-5 times per week for at least 30 minutes  BMI Counseling: Body mass index is 31 51 kg/m²   The BMI is above normal  Nutrition recommendations include decreasing portion sizes, encouraging healthy choices of fruits and vegetables, consuming healthier snacks and limiting drinks that contain sugar  Exercise recommendations include moderate physical activity 150 minutes/week  Rationale for BMI follow-up plan is due to patient being overweight or obese  Return in about 3 months (around 5/15/2023) for Recheck  Chief Complaint:     Chief Complaint   Patient presents with   • Establish Care   • Annual Exam      History of Present Illness:     Adult Annual Physical   Patient here for a comprehensive physical exam  The patient reports problems - refer to separate note  Diet and Physical Activity  Diet/Nutrition: poor diet  Exercise: walking  Depression Screening  PHQ-2/9 Depression Screening    Little interest or pleasure in doing things: 0 - not at all  Feeling down, depressed, or hopeless: 2 - more than half the days  PHQ-2 Score: 2  PHQ-2 Interpretation: Negative depression screen       General Health  Sleep: sleeps well  Hearing: normal - bilateral   Vision: no vision problems, most recent eye exam >1 year ago and wears glasses  Dental: has dentures  /GYN Health  Patient is: postmenopausal  Last menstrual period: NA  Contraceptive method: NA      Review of Systems:     Review of Systems   Refer to separate note     Past Medical History:     No past medical history on file  Past Surgical History:     History reviewed  No pertinent surgical history     Social History:     Social History     Socioeconomic History   • Marital status: Single     Spouse name: None   • Number of children: None   • Years of education: None   • Highest education level: None   Occupational History   • None   Tobacco Use   • Smoking status: Some Days     Packs/day: 0 25     Years: 30 00     Pack years: 7 50     Types: Cigarettes   • Smokeless tobacco: None   Vaping Use   • Vaping Use: Never used   Substance and Sexual Activity   • Alcohol use: Yes     Alcohol/week: 40 0 standard drinks     Types: 40 Cans of beer per week   • Drug use: Not Currently   • Sexual activity: Not Currently   Other Topics Concern   • None   Social History Narrative   • None     Social Determinants of Health     Financial Resource Strain: Not on file   Food Insecurity: Not on file   Transportation Needs: Not on file   Physical Activity: Not on file   Stress: Not on file   Social Connections: Not on file   Intimate Partner Violence: Not on file   Housing Stability: Not on file      Family History:     Family History   Problem Relation Age of Onset   • Diabetes Mother    • Diabetes Brother       Current Medications:     Current Outpatient Medications   Medication Sig Dispense Refill   • albuterol (ProAir HFA) 90 mcg/act inhaler Inhale 2 puffs every 6 (six) hours as needed for wheezing or shortness of breath 18 g 3   • docusate sodium (COLACE) 100 mg capsule Take 100 mg by mouth 2 (two) times a day     • folic acid (FOLVITE) 1 mg tablet Take by mouth daily     • furosemide (LASIX) 40 mg tablet TAKE 1 TABLET BY MOUTH EACH MORNING  • metoprolol tartrate (LOPRESSOR) 50 mg tablet Take 1 tablet (50 mg total) by mouth every 12 (twelve) hours 180 tablet 1   • mometasone-formoterol (Dulera) 100-5 MCG/ACT inhaler Inhale 2 puffs 2 (two) times a day Rinse mouth after use  13 g 3   • montelukast (SINGULAIR) 10 mg tablet Take 1 tablet (10 mg total) by mouth daily at bedtime 90 tablet 1   • omeprazole (PriLOSEC) 20 mg delayed release capsule Take 1 capsule (20 mg total) by mouth daily 90 capsule 1   • potassium chloride (Klor-Con) 10 mEq tablet Take 10 mEq by mouth 2 (two) times a day     • Trulicity 1 96 NB/0 2CV injection Inject 0 5 mL (0 75 mg total) under the skin every 7 days 6 mL 3   • vitamin B-12 (VITAMIN B-12) 1,000 mcg tablet Take by mouth daily       No current facility-administered medications for this visit  Allergies:      Allergies   Allergen Reactions   • Pollen Extract Eye Swelling      Physical Exam:     /84   Pulse 100   Temp 99 1 °F (37 3 °C)   Resp 18   Ht 5' 9" (1 753 m)   Wt 96 8 kg (213 lb 6 4 oz)   SpO2 98%   BMI 31 51 kg/m²     Physical Exam   Refer to separate note    Chrissy Loja MD  [address]

## 2023-02-17 LAB
HCV RNA SERPL NAA+PROBE-ACNC: NORMAL IU/ML
TEST INFORMATION: NORMAL

## 2023-02-21 ENCOUNTER — TELEPHONE (OUTPATIENT)
Dept: PSYCHIATRY | Facility: CLINIC | Age: 48
End: 2023-02-21

## 2023-02-21 NOTE — TELEPHONE ENCOUNTER
Reached out to pt in regard to referral for psychiatry, pt stated she guerrero not drive and would not be able to do virtual visits   I informed pt I will reach back once I receive additional information

## 2023-05-13 ENCOUNTER — HOSPITAL ENCOUNTER (EMERGENCY)
Facility: HOSPITAL | Age: 48
Discharge: HOME | End: 2023-05-13
Attending: EMERGENCY MEDICINE | Admitting: EMERGENCY MEDICINE

## 2023-05-13 VITALS
DIASTOLIC BLOOD PRESSURE: 64 MMHG | SYSTOLIC BLOOD PRESSURE: 125 MMHG | RESPIRATION RATE: 18 BRPM | OXYGEN SATURATION: 96 % | HEART RATE: 85 BPM | TEMPERATURE: 97.8 F

## 2023-05-13 DIAGNOSIS — E87.1 HYPONATREMIA: ICD-10-CM

## 2023-05-13 DIAGNOSIS — E83.42 HYPOMAGNESEMIA: ICD-10-CM

## 2023-05-13 DIAGNOSIS — I10 HYPERTENSION, UNSPECIFIED TYPE: Primary | ICD-10-CM

## 2023-05-13 DIAGNOSIS — F10.10 ALCOHOL ABUSE: ICD-10-CM

## 2023-05-13 LAB
ALBUMIN SERPL BCP-MCNC: 3.6 G/DL (ref 3.5–5)
ALP SERPL-CCNC: 132 U/L (ref 34–104)
ALT SERPL W P-5'-P-CCNC: 12 U/L (ref 7–52)
ANION GAP SERPL CALCULATED.3IONS-SCNC: 9 MMOL/L (ref 4–13)
AST SERPL W P-5'-P-CCNC: 23 U/L (ref 13–39)
ATRIAL RATE: 88 BPM
BILIRUB SERPL-MCNC: 0.22 MG/DL (ref 0.2–1)
BUN SERPL-MCNC: 9 MG/DL (ref 5–25)
CALCIUM SERPL-MCNC: 8 MG/DL (ref 8.4–10.2)
CHLORIDE SERPL-SCNC: 100 MMOL/L (ref 96–108)
CO2 SERPL-SCNC: 19 MMOL/L (ref 21–32)
CREAT SERPL-MCNC: 1.01 MG/DL (ref 0.6–1.3)
GFR SERPL CREATININE-BSD FRML MDRD: 65 ML/MIN/1.73SQ M
GLUCOSE SERPL-MCNC: 127 MG/DL (ref 65–140)
MAGNESIUM SERPL-MCNC: 1.8 MG/DL (ref 1.9–2.7)
P AXIS: 33 DEGREES
POTASSIUM SERPL-SCNC: 3.5 MMOL/L (ref 3.5–5.3)
PR INTERVAL: 180 MS
PROT SERPL-MCNC: 6.3 G/DL (ref 6.4–8.4)
QRS AXIS: 54 DEGREES
QRSD INTERVAL: 88 MS
QT INTERVAL: 364 MS
QTC INTERVAL: 440 MS
SODIUM SERPL-SCNC: 128 MMOL/L (ref 135–147)
T WAVE AXIS: 29 DEGREES
VENTRICULAR RATE: 88 BPM

## 2023-05-13 RX ORDER — MAGNESIUM SULFATE HEPTAHYDRATE 40 MG/ML
2 INJECTION, SOLUTION INTRAVENOUS ONCE
Status: COMPLETED | OUTPATIENT
Start: 2023-05-13 | End: 2023-05-13

## 2023-05-13 RX ADMIN — MAGNESIUM SULFATE HEPTAHYDRATE 2 G: 40 INJECTION, SOLUTION INTRAVENOUS at 06:17

## 2023-05-13 RX ADMIN — SODIUM CHLORIDE 1000 ML: 0.9 INJECTION, SOLUTION INTRAVENOUS at 06:18

## 2023-05-13 NOTE — DISCHARGE INSTRUCTIONS
Continue to take blood pressure medications as previously prescribed, follow-up closely with primary care physician, return for any worsening symptoms

## 2023-05-14 NOTE — ED PROVIDER NOTES
History  Chief Complaint   Patient presents with   • Hypertension     Took BP at 0300 and was 160/110 and HR was 104; pt reports her asthma is bothering her but denies SOB  Pt also reports drinking 4 beers at 650     29-year-old female with hypertension presents to the emergency department for evaluation of elevated blood pressure reading  Patient reports she was watching TV around 3 AM when she checked her blood pressure and noted it to be 160/110 and also noted that her heart rate was 104  She does admit to drinking some beers prior to this  Denies any associated chest pain or shortness of breath  No palpitations or lightheadedness  Her only other complaint is generally feeling unwell  No leg pain or swelling  No abdominal pain, nausea or vomiting  No blurry vision or double vision  Patient states she does take her blood pressure medications as previously prescribed but is unable to tell me the name of the medication  Prior to Admission Medications   Prescriptions Last Dose Informant Patient Reported? Taking?    Trulicity 7 67 ML/4 5ZZ injection   No No   Sig: Inject 0 5 mL (0 75 mg total) under the skin every 7 days   albuterol (ProAir HFA) 90 mcg/act inhaler   No No   Sig: Inhale 2 puffs every 6 (six) hours as needed for wheezing or shortness of breath   docusate sodium (COLACE) 100 mg capsule   Yes No   Sig: Take 100 mg by mouth 2 (two) times a day   folic acid (FOLVITE) 1 mg tablet   Yes No   Sig: Take by mouth daily   furosemide (LASIX) 40 mg tablet   Yes No   Sig: TAKE 1 TABLET BY MOUTH EACH MORNING    metoprolol tartrate (LOPRESSOR) 50 mg tablet   No No   Sig: Take 1 tablet (50 mg total) by mouth every 12 (twelve) hours   mometasone-formoterol (Dulera) 100-5 MCG/ACT inhaler   No No   Sig: Inhale 2 puffs 2 (two) times a day Rinse mouth after use    montelukast (SINGULAIR) 10 mg tablet   No No   Sig: Take 1 tablet (10 mg total) by mouth daily at bedtime   omeprazole (PriLOSEC) 20 mg delayed release capsule   No No   Sig: Take 1 capsule (20 mg total) by mouth daily   potassium chloride (Klor-Con) 10 mEq tablet   Yes No   Sig: Take 10 mEq by mouth 2 (two) times a day   vitamin B-12 (VITAMIN B-12) 1,000 mcg tablet   Yes No   Sig: Take by mouth daily      Facility-Administered Medications: None       History reviewed  No pertinent past medical history  History reviewed  No pertinent surgical history  Family History   Problem Relation Age of Onset   • Diabetes Mother    • Diabetes Brother      I have reviewed and agree with the history as documented  E-Cigarette/Vaping   • E-Cigarette Use Never User      E-Cigarette/Vaping Substances     Social History     Tobacco Use   • Smoking status: Some Days     Packs/day: 0 25     Years: 30 00     Pack years: 7 50     Types: Cigarettes   Vaping Use   • Vaping Use: Never used   Substance Use Topics   • Alcohol use: Yes     Alcohol/week: 40 0 standard drinks     Types: 40 Cans of beer per week   • Drug use: Not Currently       Review of Systems   Constitutional: Negative for chills and fever  HENT: Negative for ear pain and sore throat  Eyes: Negative for pain and visual disturbance  Respiratory: Negative for cough and shortness of breath  Cardiovascular: Negative for chest pain and palpitations  Gastrointestinal: Negative for abdominal pain and vomiting  Genitourinary: Negative for dysuria and hematuria  Musculoskeletal: Negative for arthralgias and back pain  Skin: Negative for color change and rash  Neurological: Negative for seizures and syncope  All other systems reviewed and are negative  Physical Exam  Physical Exam  Vitals and nursing note reviewed  Constitutional:       General: She is not in acute distress  HENT:      Head: Normocephalic and atraumatic        Right Ear: External ear normal       Left Ear: External ear normal       Nose: Nose normal       Mouth/Throat:      Mouth: Mucous membranes are moist    Eyes: Extraocular Movements: Extraocular movements intact  Conjunctiva/sclera: Conjunctivae normal       Pupils: Pupils are equal, round, and reactive to light  Cardiovascular:      Rate and Rhythm: Normal rate and regular rhythm  Pulses: Normal pulses  Heart sounds: Normal heart sounds  No murmur heard  Pulmonary:      Effort: Pulmonary effort is normal  No respiratory distress  Breath sounds: Normal breath sounds  Abdominal:      General: Abdomen is flat  Bowel sounds are normal       Tenderness: There is no abdominal tenderness  There is no guarding  Musculoskeletal:         General: No deformity  Normal range of motion  Cervical back: Normal range of motion and neck supple  Skin:     General: Skin is warm and dry  Capillary Refill: Capillary refill takes less than 2 seconds  Neurological:      General: No focal deficit present  Mental Status: She is alert and oriented to person, place, and time     Psychiatric:         Mood and Affect: Mood normal          Behavior: Behavior normal          Vital Signs  ED Triage Vitals   Temperature Pulse Respirations Blood Pressure SpO2   05/13/23 0530 05/13/23 0525 05/13/23 0525 05/13/23 0525 05/13/23 0525   (!) 96 8 °F (36 °C) 95 19 141/91 98 %      Temp Source Heart Rate Source Patient Position - Orthostatic VS BP Location FiO2 (%)   05/13/23 0530 05/13/23 0530 05/13/23 0530 05/13/23 0525 --   Tympanic Monitor Lying Left arm       Pain Score       --                  Vitals:    05/13/23 0525 05/13/23 0530 05/13/23 0700   BP: 141/91 131/79 125/64   Pulse: 95 85 85   Patient Position - Orthostatic VS:  Lying          Visual Acuity      ED Medications  Medications   sodium chloride 0 9 % bolus 1,000 mL (0 mL Intravenous Stopped 5/13/23 0718)   magnesium sulfate 2 g/50 mL IVPB (premix) 2 g (0 g Intravenous Stopped 5/13/23 3722)       Diagnostic Studies  Results Reviewed     Procedure Component Value Units Date/Time    Comprehensive metabolic panel [[id number]]  (Abnormal) Collected: 05/13/23 0541    Lab Status: Final result Specimen: Blood from Arm, Right Updated: 05/13/23 0603     Sodium 128 mmol/L      Potassium 3 5 mmol/L      Chloride 100 mmol/L      CO2 19 mmol/L      ANION GAP 9 mmol/L      BUN 9 mg/dL      Creatinine 1 01 mg/dL      Glucose 127 mg/dL      Calcium 8 0 mg/dL      AST 23 U/L      ALT 12 U/L      Alkaline Phosphatase 132 U/L      Total Protein 6 3 g/dL      Albumin 3 6 g/dL      Total Bilirubin 0 22 mg/dL      eGFR 65 ml/min/1 73sq m     Narrative:      [facility] guidelines for Chronic Kidney Disease (CKD):   •  Stage 1 with normal or high GFR (GFR > 90 mL/min/1 73 square meters)  •  Stage 2 Mild CKD (GFR = 60-89 mL/min/1 73 square meters)  •  Stage 3A Moderate CKD (GFR = 45-59 mL/min/1 73 square meters)  •  Stage 3B Moderate CKD (GFR = 30-44 mL/min/1 73 square meters)  •  Stage 4 Severe CKD (GFR = 15-29 mL/min/1 73 square meters)  •  Stage 5 End Stage CKD (GFR <15 mL/min/1 73 square meters)  Note: GFR calculation is accurate only with a steady state creatinine    Magnesium [[id number]]  (Abnormal) Collected: 05/13/23 0541    Lab Status: Final result Specimen: Blood from Arm, Right Updated: 05/13/23 0603     Magnesium 1 8 mg/dL                  No orders to display              Procedures  Procedures         ED Course  ED Course as of 05/13/23 2121   Sat May 13, 2023   0530 EKG interpreted by myself demonstrates normal sinus rhythm with a rate 80, normal axis, normal intervals, normal ST segment and T waves   0607 Sodium(!): 128   0608 Sodium(!): 128  Likely from chronic alcohol use give a liter of fluids                               SBIRT 22yo+    Flowsheet Row Most Recent Value   Initial Alcohol Screen: US AUDIT-C     1  How often do you have a drink containing alcohol? 6 Filed at: 05/13/2023 0523   2  How many drinks containing alcohol do you have on a typical day you are drinking?   4 Filed at: 05/13/2023 0523   3a  Male UNDER 65: How often do you have five or more drinks on one occasion? 0 Filed at: 05/13/2023 0523   3b  FEMALE Any Age, or MALE 65+: How often do you have 4 or more drinks on one occassion? 5 Filed at: 05/13/2023 0523   Audit-C Score 15 Filed at: 05/13/2023 4758   Full Alcohol Screen: US AUDIT    4  How often during the last year have you found that you were not able to stop drinking once you had started? 3 Filed at: 05/13/2023 0523   5  How often during past year have you failed to do what was normally expected of you because of drinking? 1 Filed at: 05/13/2023 0523   6  How often in past year have you needed a first drink in the morning to get yourself going after a heavy drinking session? 0 Filed at: 05/13/2023 0523   7  How often in past year have you had feeling of guilt or remorse after drinking? 2 Filed at: 05/13/2023 0523   8  How often in past year have you been unable to remember what happened night before because you had been drinking? 0 Filed at: 05/13/2023 0523   9  Have you or someone else been injured as a result of your drinking? 0 Filed at: 05/13/2023 0523   10  Has a relative, friend, doctor or other health worker been concerned about your drinking and suggested you cut down? 4 Filed at: 05/13/2023 4914   AUDIT Total Score 25 Filed at: 05/13/2023 6059   LUANN: How many times in the past year have you    Used an illegal drug or used a prescription medication for non-medical reasons? Never Filed at: 05/13/2023 Mj Leon 1 Making  64-year-old female presents emergency department for evaluation of elevated blood pressure reading at home as well as feeling generally unwell  On exam, she is resting comfortably in bed in no acute distress  Vital signs are stable  Blood pressure improved without intervention  We will check BMP and magnesium level as patient does have chronic alcohol abuse history and reports feeling generally unwell      EKG unremarkable, there is no signs of heart strain  Labs demonstrate hyponatremia which is likely from chronic alcohol consumption and mild hypomagnesemia  Will give liter of fluids and magnesium repletion  Discharge home following fluids  Patient was given strict return precautions and advised to continue taking medications as previously prescribed and to follow-up closely with primary care physician in the next few days for repeat evaluation  Alcohol abuse: chronic illness or injury  Hypertension, unspecified type: acute illness or injury  Hypomagnesemia: acute illness or injury  Hyponatremia: acute illness or injury  Amount and/or Complexity of Data Reviewed  Labs: ordered  Decision-making details documented in ED Course  Risk  Prescription drug management  Disposition  Final diagnoses:   Hypertension, unspecified type   Hyponatremia   Hypomagnesemia   Alcohol abuse     Time reflects when diagnosis was documented in both MDM as applicable and the Disposition within this note     Time User Action Codes Description Comment    5/13/2023  5:53 AM Check, Amari Reeve Add [I10] Hypertension, unspecified type     5/13/2023  6:08 AM Check, Amari Reeve Add [E87 1] Hyponatremia     5/13/2023  6:08 AM Check, Amari Reeve Add [E83 42] Hypomagnesemia     5/13/2023  6:08 AM Check, Antonia Duncan [F10 10] Alcohol abuse       ED Disposition     ED Disposition   Discharge    Condition   Stable    Date/Time   Sat May 13, 2023  6:55 AM    Comment   Tam Robles discharge to home/self care                 Follow-up Information     Follow up With Specialties Details Why Contact Info Additional [address] Emergency Department Emergency Medicine  If symptoms worsen [address] [address]  [facility] Emergency Department, [address] [place] [address], [address] [facility]    Geeta Perkins MD Internal Medicine Schedule an appointment as soon as possible for a visit   [facility] [address]  [phone]             Discharge Medication List as of 5/13/2023  6:55 AM      CONTINUE these medications which have NOT CHANGED    Details   albuterol (ProAir HFA) 90 mcg/act inhaler Inhale 2 puffs every 6 (six) hours as needed for wheezing or shortness of breath, Starting Wed 2/15/2023, Normal      docusate sodium (COLACE) 100 mg capsule Take 100 mg by mouth 2 (two) times a day, Historical Med      folic acid (FOLVITE) 1 mg tablet Take by mouth daily, Historical Med      furosemide (LASIX) 40 mg tablet TAKE 1 TABLET BY MOUTH EACH MORNING , Historical Med      metoprolol tartrate (LOPRESSOR) 50 mg tablet Take 1 tablet (50 mg total) by mouth every 12 (twelve) hours, Starting Wed 2/15/2023, Normal      mometasone-formoterol (Dulera) 100-5 MCG/ACT inhaler Inhale 2 puffs 2 (two) times a day Rinse mouth after use , Starting Wed 2/15/2023, Normal      montelukast (SINGULAIR) 10 mg tablet Take 1 tablet (10 mg total) by mouth daily at bedtime, Starting Wed 2/15/2023, Normal      omeprazole (PriLOSEC) 20 mg delayed release capsule Take 1 capsule (20 mg total) by mouth daily, Starting Wed 2/15/2023, Normal      potassium chloride (Klor-Con) 10 mEq tablet Take 10 mEq by mouth 2 (two) times a day, Historical Med      Trulicity 9 83 MW/5 0KV injection Inject 0 5 mL (0 75 mg total) under the skin every 7 days, Starting Wed 2/15/2023, Normal      vitamin B-12 (VITAMIN B-12) 1,000 mcg tablet Take by mouth daily, Historical Med             No discharge procedures on file      PDMP Review     None          ED Provider  Electronically Signed by           Angela Saenz MD  05/13/23 8078

## 2023-05-15 ENCOUNTER — OFFICE VISIT (OUTPATIENT)
Dept: FAMILY MEDICINE CLINIC | Facility: CLINIC | Age: 48
End: 2023-05-15

## 2023-05-15 ENCOUNTER — APPOINTMENT (OUTPATIENT)
Dept: LAB | Facility: CLINIC | Age: 48
End: 2023-05-15

## 2023-05-15 VITALS
TEMPERATURE: 98.9 F | DIASTOLIC BLOOD PRESSURE: 70 MMHG | HEIGHT: 69 IN | RESPIRATION RATE: 18 BRPM | HEART RATE: 87 BPM | WEIGHT: 231.2 LBS | BODY MASS INDEX: 34.24 KG/M2 | SYSTOLIC BLOOD PRESSURE: 126 MMHG | OXYGEN SATURATION: 99 %

## 2023-05-15 DIAGNOSIS — F10.20 ALCOHOL USE DISORDER, MODERATE, DEPENDENCE (HCC): ICD-10-CM

## 2023-05-15 DIAGNOSIS — E87.1 HYPONATREMIA: ICD-10-CM

## 2023-05-15 DIAGNOSIS — R73.03 PRE-DIABETES: ICD-10-CM

## 2023-05-15 DIAGNOSIS — Z12.11 SCREENING FOR COLON CANCER: ICD-10-CM

## 2023-05-15 DIAGNOSIS — E83.42 HYPOMAGNESEMIA: ICD-10-CM

## 2023-05-15 DIAGNOSIS — I10 PRIMARY HYPERTENSION: Primary | ICD-10-CM

## 2023-05-15 DIAGNOSIS — E66.09 CLASS 1 OBESITY DUE TO EXCESS CALORIES WITH SERIOUS COMORBIDITY AND BODY MASS INDEX (BMI) OF 31.0 TO 31.9 IN ADULT: ICD-10-CM

## 2023-05-15 PROBLEM — N17.9 ACUTE KIDNEY INJURY (HCC): Status: RESOLVED | Noted: 2022-05-31 | Resolved: 2023-05-15

## 2023-05-15 LAB
ANION GAP SERPL CALCULATED.3IONS-SCNC: -2 MMOL/L (ref 4–13)
BUN SERPL-MCNC: 16 MG/DL (ref 5–25)
CALCIUM SERPL-MCNC: 8.6 MG/DL (ref 8.3–10.1)
CHLORIDE SERPL-SCNC: 111 MMOL/L (ref 96–108)
CO2 SERPL-SCNC: 26 MMOL/L (ref 21–32)
CREAT SERPL-MCNC: 1.21 MG/DL (ref 0.6–1.3)
GFR SERPL CREATININE-BSD FRML MDRD: 52 ML/MIN/1.73SQ M
GLUCOSE P FAST SERPL-MCNC: 90 MG/DL (ref 65–99)
MAGNESIUM SERPL-MCNC: 2.2 MG/DL (ref 1.6–2.6)
POTASSIUM SERPL-SCNC: 4.2 MMOL/L (ref 3.5–5.3)
SODIUM SERPL-SCNC: 135 MMOL/L (ref 135–147)

## 2023-05-15 NOTE — PROGRESS NOTES
[facility] Primary [facility]        NAME: Jacqueline Felton is a 50 y o  female  : 1975    MRN: [id number]  DATE: May 16, 2023  TIME: 10:48 AM    Assessment and Plan   1  Primary hypertension  - Bp controlled at this time, suspect alcohol intoxication and/or withdrawal has been contributing    2  Screening for colon cancer  -     Cologuard    3  Alcohol use disorder, moderate, dependence ([facility] [address] )  - given local resources for help/support, she is pre-contemplative    4  Hyponatremia  -     Basic metabolic panel; Future  -     Magnesium; Future    5  Hypomagnesemia  -     Basic metabolic panel; Future  -     Magnesium; Future    6  Pre-diabetes    7  Class 1 obesity due to excess calories with serious comorbidity and body mass index (BMI) of 31 0 to 31 9 in adult  -will increase trulicity due to weight gain, discussed alcohol cessation as well   -     dulaglutide (Trulicity) 1 5 PP/9 9CA injection; Inject 0 5 mL (1 5 mg total) under the skin every 7 days             Chief Complaint     Chief Complaint   Patient presents with   • Follow-up         History of Present Illness       50yo female with history of HTN, bipolar disorder, alcohol abuse here for 3 month follow up  Was in the ER recently for elevated BP  Reports home /100, also admits to recent relapses with alcohol - drinking at least 6 pack daily  She has been to rehab and AA in the past and had several years of sobriety, but not ready to quit at this time  Hasn't established with psychiatrist locally yet  Admits to hearing voices  Review of Systems   Review of Systems   Constitutional: Positive for activity change and appetite change  Negative for chills, fever and unexpected weight change  Respiratory: Positive for cough  Negative for chest tightness and shortness of breath  Neurological: Negative for dizziness and light-headedness     Psychiatric/Behavioral: Positive for decreased concentration, dysphoric mood, hallucinations and sleep disturbance  The patient is nervous/anxious  Current Medications       Current Outpatient Medications:   •  albuterol (ProAir HFA) 90 mcg/act inhaler, Inhale 2 puffs every 6 (six) hours as needed for wheezing or shortness of breath, Disp: 18 g, Rfl: 3  •  docusate sodium (COLACE) 100 mg capsule, Take 100 mg by mouth 2 (two) times a day, Disp: , Rfl:   •  dulaglutide (Trulicity) 1 5 HP/7 2NQ injection, Inject 0 5 mL (1 5 mg total) under the skin every 7 days, Disp: 6 mL, Rfl: 1  •  folic acid (FOLVITE) 1 mg tablet, Take by mouth daily, Disp: , Rfl:   •  furosemide (LASIX) 40 mg tablet, TAKE 1 TABLET BY MOUTH EACH MORNING , Disp: , Rfl:   •  metoprolol tartrate (LOPRESSOR) 50 mg tablet, Take 1 tablet (50 mg total) by mouth every 12 (twelve) hours, Disp: 180 tablet, Rfl: 1  •  mometasone-formoterol (Dulera) 100-5 MCG/ACT inhaler, Inhale 2 puffs 2 (two) times a day Rinse mouth after use , Disp: 13 g, Rfl: 3  •  montelukast (SINGULAIR) 10 mg tablet, Take 1 tablet (10 mg total) by mouth daily at bedtime, Disp: 90 tablet, Rfl: 1  •  omeprazole (PriLOSEC) 20 mg delayed release capsule, Take 1 capsule (20 mg total) by mouth daily, Disp: 90 capsule, Rfl: 1  •  potassium chloride (Klor-Con) 10 mEq tablet, Take 10 mEq by mouth 2 (two) times a day, Disp: , Rfl:   •  vitamin B-12 (VITAMIN B-12) 1,000 mcg tablet, Take by mouth daily, Disp: , Rfl:     Current Allergies     Allergies as of 05/15/2023 - Reviewed 05/15/2023   Allergen Reaction Noted   • Pollen extract Eye Swelling 01/07/2023            The following portions of the patient's history were reviewed and updated as appropriate: allergies, current medications, past family history, past medical history, past social history, past surgical history and problem list      No past medical history on file  No past surgical history on file      Family History   Problem Relation Age of Onset   • Diabetes Mother    • Diabetes Brother          Medications have been "verified  Objective   /70   Pulse 87   Temp 98 9 °F (37 2 °C)   Resp 18   Ht 5' 9\" (1 753 m)   Wt 105 kg (231 lb 3 2 oz)   SpO2 99%   BMI 34 14 kg/m²        Physical Exam     Physical Exam  Vitals reviewed  Constitutional:       General: She is not in acute distress  Appearance: She is obese  Cardiovascular:      Rate and Rhythm: Normal rate and regular rhythm  Heart sounds: No murmur heard  No friction rub  No gallop  Pulmonary:      Effort: No respiratory distress  Breath sounds: Normal breath sounds  No wheezing or rhonchi  Abdominal:      General: Abdomen is flat  Bowel sounds are normal       Palpations: Abdomen is soft  Tenderness: There is abdominal tenderness in the right upper quadrant  There is no guarding or rebound  Neurological:      General: No focal deficit present  Mental Status: She is alert  Psychiatric:         Mood and Affect: Mood and affect normal          Speech: Speech normal          Behavior: Behavior normal  Behavior is cooperative  Thought Content:  Thought content normal              Results:  Lab Results   Component Value Date    SODIUM 135 05/15/2023    K 4 2 05/15/2023     (H) 05/15/2023    CO2 26 05/15/2023    BUN 16 05/15/2023    CREATININE 1 21 05/15/2023    GLUC 127 05/13/2023    CALCIUM 8 6 05/15/2023       Lab Results   Component Value Date    HGBA1C 5 5 02/15/2023       Lab Results   Component Value Date    WBC 6 47 02/15/2023    HGB 11 6 02/15/2023    HCT 38 3 02/15/2023    MCV 89 02/15/2023     02/15/2023         "

## 2023-06-12 ENCOUNTER — APPOINTMENT (EMERGENCY)
Dept: RADIOLOGY | Facility: HOSPITAL | Age: 48
End: 2023-06-12
Payer: COMMERCIAL

## 2023-06-12 ENCOUNTER — HOSPITAL ENCOUNTER (EMERGENCY)
Facility: HOSPITAL | Age: 48
Discharge: HOME | End: 2023-06-12
Attending: EMERGENCY MEDICINE | Admitting: FAMILY MEDICINE
Payer: COMMERCIAL

## 2023-06-12 VITALS
DIASTOLIC BLOOD PRESSURE: 60 MMHG | RESPIRATION RATE: 18 BRPM | OXYGEN SATURATION: 97 % | HEART RATE: 90 BPM | SYSTOLIC BLOOD PRESSURE: 106 MMHG | TEMPERATURE: 98.2 F

## 2023-06-12 DIAGNOSIS — R07.89 ATYPICAL CHEST PAIN: Primary | ICD-10-CM

## 2023-06-12 DIAGNOSIS — F10.10 ALCOHOL ABUSE: ICD-10-CM

## 2023-06-12 LAB
ALBUMIN SERPL BCP-MCNC: 3.6 G/DL (ref 3.5–5)
ALP SERPL-CCNC: 127 U/L (ref 34–104)
ALT SERPL W P-5'-P-CCNC: 13 U/L (ref 7–52)
ANION GAP SERPL CALCULATED.3IONS-SCNC: 9 MMOL/L (ref 4–13)
APTT PPP: 24 SECONDS (ref 23–37)
AST SERPL W P-5'-P-CCNC: 21 U/L (ref 13–39)
ATRIAL RATE: 73 BPM
BASOPHILS # BLD AUTO: 0.13 THOUSANDS/ÂΜL (ref 0–0.1)
BASOPHILS NFR BLD AUTO: 1 % (ref 0–1)
BILIRUB SERPL-MCNC: 0.24 MG/DL (ref 0.2–1)
BNP SERPL-MCNC: 52 PG/ML (ref 0–100)
BUN SERPL-MCNC: 9 MG/DL (ref 5–25)
CALCIUM SERPL-MCNC: 8.2 MG/DL (ref 8.4–10.2)
CARDIAC TROPONIN I PNL SERPL HS: 4 NG/L
CHLORIDE SERPL-SCNC: 103 MMOL/L (ref 96–108)
CO2 SERPL-SCNC: 19 MMOL/L (ref 21–32)
CREAT SERPL-MCNC: 1.05 MG/DL (ref 0.6–1.3)
EOSINOPHIL # BLD AUTO: 0.31 THOUSAND/ÂΜL (ref 0–0.61)
EOSINOPHIL NFR BLD AUTO: 3 % (ref 0–6)
ERYTHROCYTE [DISTWIDTH] IN BLOOD BY AUTOMATED COUNT: 15.1 % (ref 11.6–15.1)
ETHANOL SERPL-MCNC: 106 MG/DL
ETHANOL SERPL-MCNC: 220 MG/DL
ETHANOL SERPL-MCNC: <10 MG/DL
GFR SERPL CREATININE-BSD FRML MDRD: 62 ML/MIN/1.73SQ M
GLUCOSE SERPL-MCNC: 95 MG/DL (ref 65–140)
HCT VFR BLD AUTO: 34.1 % (ref 34.8–46.1)
HGB BLD-MCNC: 10.4 G/DL (ref 11.5–15.4)
IMM GRANULOCYTES # BLD AUTO: 0.03 THOUSAND/UL (ref 0–0.2)
IMM GRANULOCYTES NFR BLD AUTO: 0 % (ref 0–2)
INR PPP: 0.96 (ref 0.84–1.19)
LIPASE SERPL-CCNC: 33 U/L (ref 11–82)
LYMPHOCYTES # BLD AUTO: 4.24 THOUSANDS/ÂΜL (ref 0.6–4.47)
LYMPHOCYTES NFR BLD AUTO: 47 % (ref 14–44)
MCH RBC QN AUTO: 27.3 PG (ref 26.8–34.3)
MCHC RBC AUTO-ENTMCNC: 30.5 G/DL (ref 31.4–37.4)
MCV RBC AUTO: 90 FL (ref 82–98)
MONOCYTES # BLD AUTO: 0.9 THOUSAND/ÂΜL (ref 0.17–1.22)
MONOCYTES NFR BLD AUTO: 10 % (ref 4–12)
NEUTROPHILS # BLD AUTO: 3.55 THOUSANDS/ÂΜL (ref 1.85–7.62)
NEUTS SEG NFR BLD AUTO: 39 % (ref 43–75)
NRBC BLD AUTO-RTO: 0 /100 WBCS
P AXIS: 49 DEGREES
PLATELET # BLD AUTO: 328 THOUSANDS/UL (ref 149–390)
PMV BLD AUTO: 10.1 FL (ref 8.9–12.7)
POTASSIUM SERPL-SCNC: 3.5 MMOL/L (ref 3.5–5.3)
PR INTERVAL: 168 MS
PROT SERPL-MCNC: 6.4 G/DL (ref 6.4–8.4)
PROTHROMBIN TIME: 12.8 SECONDS (ref 11.6–14.5)
QRS AXIS: 53 DEGREES
QRSD INTERVAL: 90 MS
QT INTERVAL: 382 MS
QTC INTERVAL: 420 MS
RBC # BLD AUTO: 3.81 MILLION/UL (ref 3.81–5.12)
SODIUM SERPL-SCNC: 131 MMOL/L (ref 135–147)
T WAVE AXIS: 17 DEGREES
VENTRICULAR RATE: 73 BPM
WBC # BLD AUTO: 9.16 THOUSAND/UL (ref 4.31–10.16)

## 2023-06-12 PROCEDURE — 93005 ELECTROCARDIOGRAM TRACING: CPT

## 2023-06-12 PROCEDURE — 85025 COMPLETE CBC W/AUTO DIFF WBC: CPT | Performed by: EMERGENCY MEDICINE

## 2023-06-12 PROCEDURE — 82077 ASSAY SPEC XCP UR&BREATH IA: CPT | Performed by: EMERGENCY MEDICINE

## 2023-06-12 PROCEDURE — 83690 ASSAY OF LIPASE: CPT | Performed by: EMERGENCY MEDICINE

## 2023-06-12 PROCEDURE — 93010 ELECTROCARDIOGRAM REPORT: CPT | Performed by: INTERNAL MEDICINE

## 2023-06-12 PROCEDURE — 85610 PROTHROMBIN TIME: CPT | Performed by: EMERGENCY MEDICINE

## 2023-06-12 PROCEDURE — 80053 COMPREHEN METABOLIC PANEL: CPT | Performed by: EMERGENCY MEDICINE

## 2023-06-12 PROCEDURE — 82077 ASSAY SPEC XCP UR&BREATH IA: CPT | Performed by: FAMILY MEDICINE

## 2023-06-12 PROCEDURE — 83880 ASSAY OF NATRIURETIC PEPTIDE: CPT | Performed by: EMERGENCY MEDICINE

## 2023-06-12 PROCEDURE — 85730 THROMBOPLASTIN TIME PARTIAL: CPT | Performed by: EMERGENCY MEDICINE

## 2023-06-12 PROCEDURE — 84484 ASSAY OF TROPONIN QUANT: CPT | Performed by: EMERGENCY MEDICINE

## 2023-06-12 PROCEDURE — 71045 X-RAY EXAM CHEST 1 VIEW: CPT

## 2023-06-12 PROCEDURE — 36415 COLL VENOUS BLD VENIPUNCTURE: CPT | Performed by: EMERGENCY MEDICINE

## 2023-06-12 NOTE — ED CARE HANDOFF
Emergency Department Sign Out Note        Sign out and transfer of care from [facility]  See Separate Emergency Department note  The patient, Madelin Conway, was evaluated by the previous provider for alcohol intoxication and chest pain    Workup Completed:  Labs     ED Course / Workup Pending (followup):  58-year-old female presented with chest pain alcohol intoxication  Patient stayed in the ED due to elevated alcohol level now down     Patient has been stable in the ED  Will discharge home  No suicidal homicidal ideation  HEART Risk Score    Flowsheet Row Most Recent Value   Heart Score Risk Calculator    History 0 Filed at: 06/12/2023 0650   ECG 0 Filed at: 06/12/2023 0650   Age 1 Filed at: 06/12/2023 0650   Risk Factors 0 Filed at: 06/12/2023 0650   Troponin 0 Filed at: 06/12/2023 0650   HEART Score 1 Filed at: 06/12/2023 9030                                  ED Course as of 06/12/23 1514   Mon Jun 12, 2023   0659 Yo f came with chest pain sob intoxicated  Labs WNL troponin negative  Needs a ride once sober  Procedures  MDM        Disposition  Final diagnoses:   Atypical chest pain   Alcohol abuse     Time reflects when diagnosis was documented in both MDM as applicable and the Disposition within this note     Time User Action Codes Description Comment    6/12/2023  6:43 AM Jose Crisp Add [R07 89] Atypical chest pain     6/12/2023  6:44 AM Jose Crisp Add [F10 10] Alcohol abuse       ED Disposition     ED Disposition   Discharge    Condition   Stable    Date/Time   Mon Jun 12, 2023  3:14 PM    Comment   Gaston Robles discharge to home/self care                 Follow-up Information     Follow up With Specialties Details Why Contact Info    Harini Olvera MD Internal Medicine Schedule an appointment as soon as possible for a visit in 2 days If symptoms worsen [address]  [phone]          Current Discharge Medication List      CONTINUE these medications which have NOT CHANGED    Details   albuterol (ProAir HFA) 90 mcg/act inhaler Inhale 2 puffs every 6 (six) hours as needed for wheezing or shortness of breath  Qty: 18 g, Refills: 3    Comments: Substitution to a formulary equivalent within the same pharmaceutical class is authorized  Associated Diagnoses: Mild intermittent asthma without complication      docusate sodium (COLACE) 100 mg capsule Take 100 mg by mouth 2 (two) times a day      dulaglutide (Trulicity) 1 5 LY/5 8VH injection Inject 0 5 mL (1 5 mg total) under the skin every 7 days  Qty: 6 mL, Refills: 1    Associated Diagnoses: Class 1 obesity due to excess calories with serious comorbidity and body mass index (BMI) of 31 0 to 31 9 in adult      folic acid (FOLVITE) 1 mg tablet Take by mouth daily      furosemide (LASIX) 40 mg tablet TAKE 1 TABLET BY MOUTH EACH MORNING       metoprolol tartrate (LOPRESSOR) 50 mg tablet Take 1 tablet (50 mg total) by mouth every 12 (twelve) hours  Qty: 180 tablet, Refills: 1    Associated Diagnoses: Hypertension, unspecified type      mometasone-formoterol (Dulera) 100-5 MCG/ACT inhaler Inhale 2 puffs 2 (two) times a day Rinse mouth after use  Qty: 13 g, Refills: 3    Associated Diagnoses: Mild intermittent asthma without complication      montelukast (SINGULAIR) 10 mg tablet Take 1 tablet (10 mg total) by mouth daily at bedtime  Qty: 90 tablet, Refills: 1    Associated Diagnoses: Mild intermittent asthma without complication      omeprazole (PriLOSEC) 20 mg delayed release capsule Take 1 capsule (20 mg total) by mouth daily  Qty: 90 capsule, Refills: 1    Associated Diagnoses: Gastroesophageal reflux disease, unspecified whether esophagitis present      potassium chloride (Klor-Con) 10 mEq tablet Take 10 mEq by mouth 2 (two) times a day      vitamin B-12 (VITAMIN B-12) 1,000 mcg tablet Take by mouth daily           No discharge procedures on file         ED Provider  Electronically Signed by     Vignesh Matute MD  06/12/23 1512

## 2023-06-12 NOTE — ED PROVIDER NOTES
History  Chief Complaint   Patient presents with   • Chest Pain     HPI    This is a very pleasant, toxic appearing, 42-year-old female, appears older than her stated age, presents to the emergency department with a chief complaint of resolved chest pain that was going on for less than 5 minutes with associated shortness of breath  Patient arrived via EMS, ALS assessed the patient, EKG that was performed on scene was within normal limits, 4 baby aspirin emergency ministered  And walked out to the ambulance and sat down on the ambulance captain's chair seat in the ambulance and his chest pain resolved spontaneously  Patient admits that she was drinking earlier this morning she usually has a sixpack of [place] beer, she sits up with her mother at nighttime and watches TV and finished her strawberry daiquiri's this morning  Prior to Admission Medications   Prescriptions Last Dose Informant Patient Reported? Taking?    albuterol (ProAir HFA) 90 mcg/act inhaler   No No   Sig: Inhale 2 puffs every 6 (six) hours as needed for wheezing or shortness of breath   docusate sodium (COLACE) 100 mg capsule   Yes No   Sig: Take 100 mg by mouth 2 (two) times a day   dulaglutide (Trulicity) 1 5 DD/0 6CD injection   No No   Sig: Inject 0 5 mL (1 5 mg total) under the skin every 7 days   folic acid (FOLVITE) 1 mg tablet   Yes No   Sig: Take by mouth daily   furosemide (LASIX) 40 mg tablet   Yes No   Sig: TAKE 1 TABLET BY MOUTH EACH MORNING    metoprolol tartrate (LOPRESSOR) 50 mg tablet   No No   Sig: Take 1 tablet (50 mg total) by mouth every 12 (twelve) hours   mometasone-formoterol (Dulera) 100-5 MCG/ACT inhaler   No No   Sig: Inhale 2 puffs 2 (two) times a day Rinse mouth after use    montelukast (SINGULAIR) 10 mg tablet   No No   Sig: Take 1 tablet (10 mg total) by mouth daily at bedtime   omeprazole (PriLOSEC) 20 mg delayed release capsule   No No   Sig: Take 1 capsule (20 mg total) by mouth daily   potassium chloride (Klor-Con) 10 mEq tablet   Yes No   Sig: Take 10 mEq by mouth 2 (two) times a day   vitamin B-12 (VITAMIN B-12) 1,000 mcg tablet   Yes No   Sig: Take by mouth daily      Facility-Administered Medications: None       History reviewed  No pertinent past medical history  History reviewed  No pertinent surgical history  Family History   Problem Relation Age of Onset   • Diabetes Mother    • Diabetes Brother      I have reviewed and agree with the history as documented  E-Cigarette/Vaping   • E-Cigarette Use Never User      E-Cigarette/Vaping Substances     Social History     Tobacco Use   • Smoking status: Some Days     Packs/day: 0 50     Years: 30 00     Total pack years: 15 00     Types: Cigarettes   Vaping Use   • Vaping Use: Never used   Substance Use Topics   • Alcohol use: Yes     Alcohol/week: 40 0 standard drinks of alcohol     Types: 40 Cans of beer per week   • Drug use: Not Currently       Review of Systems   Constitutional: Negative  HENT: Negative  Eyes: Negative  Respiratory: Positive for shortness of breath  Negative for apnea, cough, choking, chest tightness, wheezing and stridor  Cardiovascular: Positive for chest pain  Negative for palpitations and leg swelling  Gastrointestinal: Negative  Endocrine: Negative  Genitourinary: Negative  Musculoskeletal: Negative  Skin: Negative  Allergic/Immunologic: Negative  Neurological: Negative  Hematological: Negative  Psychiatric/Behavioral: Negative  Physical Exam  Physical Exam  Vitals and nursing note reviewed  Constitutional:       Appearance: She is well-developed and normal weight  HENT:      Head: Normocephalic and atraumatic  Eyes:      Extraocular Movements: Extraocular movements intact  Pupils: Pupils are equal, round, and reactive to light  Cardiovascular:      Rate and Rhythm: Normal rate and regular rhythm  Heart sounds: Normal heart sounds     Pulmonary:      Effort: Pulmonary effort is normal       Breath sounds: Normal breath sounds  Abdominal:      General: Bowel sounds are normal       Palpations: Abdomen is soft  Musculoskeletal:         General: Normal range of motion  Cervical back: Normal range of motion and neck supple  Right lower leg: No edema  Left lower leg: No edema  Skin:     General: Skin is warm  Capillary Refill: Capillary refill takes less than 2 seconds  Neurological:      General: No focal deficit present  Mental Status: She is alert and oriented to person, place, and time  Psychiatric:         Mood and Affect: Mood normal          Vital Signs  ED Triage Vitals   Temperature Pulse Respirations Blood Pressure SpO2   06/12/23 0622 06/12/23 0608 06/12/23 0608 06/12/23 0608 06/12/23 0608   97 8 °F (36 6 °C) 55 17 156/90 100 %      Temp Source Heart Rate Source Patient Position - Orthostatic VS BP Location FiO2 (%)   06/12/23 0622 06/12/23 0608 06/12/23 0608 06/12/23 0608 --   Tympanic Monitor Lying Left arm       Pain Score       06/12/23 0608       No Pain           Vitals:    06/12/23 0608   BP: 156/90   Pulse: 55   Patient Position - Orthostatic VS: Lying         Visual Acuity      ED Medications  Medications - No data to display    Diagnostic Studies  Results Reviewed     Procedure Component Value Units Date/Time    Lipase [[id number]] Collected: 06/12/23 0616    Lab Status:  In process Specimen: Blood from Arm, Right Updated: 06/12/23 0705    B-Type Natriuretic Peptide(BNP) [[id number]]  (Normal) Collected: 06/12/23 0616    Lab Status: Final result Specimen: Blood from Arm, Right Updated: 06/12/23 0650     BNP 52 pg/mL     HS Troponin 0hr (reflex protocol) [[id number]]  (Normal) Collected: 06/12/23 0616    Lab Status: Final result Specimen: Blood from Arm, Right Updated: 06/12/23 0649     hs TnI 0hr 4 ng/L     HS Troponin I 2hr [[id number]]     Lab Status: No result Specimen: Blood     Comprehensive metabolic panel [[id number]] (Abnormal) Collected: 06/12/23 0616    Lab Status: Final result Specimen: Blood from Arm, Right Updated: 06/12/23 0642     Sodium 131 mmol/L      Potassium 3 5 mmol/L      Chloride 103 mmol/L      CO2 19 mmol/L      ANION GAP 9 mmol/L      BUN 9 mg/dL      Creatinine 1 05 mg/dL      Glucose 95 mg/dL      Calcium 8 2 mg/dL      AST 21 U/L      ALT 13 U/L      Alkaline Phosphatase 127 U/L      Total Protein 6 4 g/dL      Albumin 3 6 g/dL      Total Bilirubin 0 24 mg/dL      eGFR 62 ml/min/1 73sq m     Narrative:      [place] guidelines for Chronic Kidney Disease (CKD):   •  Stage 1 with normal or high GFR (GFR > 90 mL/min/1 73 square meters)  •  Stage 2 Mild CKD (GFR = 60-89 mL/min/1 73 square meters)  •  Stage 3A Moderate CKD (GFR = 45-59 mL/min/1 73 square meters)  •  Stage 3B Moderate CKD (GFR = 30-44 mL/min/1 73 square meters)  •  Stage 4 Severe CKD (GFR = 15-29 mL/min/1 73 square meters)  •  Stage 5 End Stage CKD (GFR <15 mL/min/1 73 square meters)  Note: GFR calculation is accurate only with a steady state creatinine    Ethanol [[id number]]  (Abnormal) Collected: 06/12/23 0616    Lab Status: Final result Specimen: Blood from Arm, Right Updated: 06/12/23 0641     Ethanol Lvl 220 mg/dL     Protime-INR [[id number]]  (Normal) Collected: 06/12/23 0616    Lab Status: Final result Specimen: Blood from Arm, Right Updated: 06/12/23 0638     Protime 12 8 seconds      INR 0 96    APTT [[id number]]  (Normal) Collected: 06/12/23 0616    Lab Status: Final result Specimen: Blood from Arm, Right Updated: 06/12/23 0638     PTT 24 seconds     CBC and differential [[id number]]  (Abnormal) Collected: 06/12/23 0616    Lab Status: Final result Specimen: Blood from Arm, Right Updated: 06/12/23 0622     WBC 9 16 Thousand/uL      RBC 3 81 Million/uL      Hemoglobin 10 4 g/dL      Hematocrit 34 1 %      MCV 90 fL      MCH 27 3 pg      MCHC 30 5 g/dL      RDW 15 1 %      MPV 10 1 fL      Platelets 378 Thousands/uL nRBC 0 /100 WBCs      Neutrophils Relative 39 %      Immat GRANS % 0 %      Lymphocytes Relative 47 %      Monocytes Relative 10 %      Eosinophils Relative 3 %      Basophils Relative 1 %      Neutrophils Absolute 3 55 Thousands/µL      Immature Grans Absolute 0 03 Thousand/uL      Lymphocytes Absolute 4 24 Thousands/µL      Monocytes Absolute 0 90 Thousand/µL      Eosinophils Absolute 0 31 Thousand/µL      Basophils Absolute 0 13 Thousands/µL                  XR chest 1 view portable   ED Interpretation by Daniele Abreu III, DO (06/12 9827)   Will chest x-ray shows poor inspiratory effort, no acute osseous abnormalities, no evidence of pneumothoraces, no infiltrates noted  Procedures  ECG 12 Lead Documentation Only    Date/Time: 6/12/2023 6:16 AM    Performed by: Jesus Viera DO  Authorized by: Daniele Abreu III, DO    Indications / Diagnosis:  Resolved chest pain  ECG reviewed by me, the ED Provider: yes    Patient location:  ED  Comments:      I personally reviewed this EKG that was performed and the patient June 12, 2023, EKG was completed at 6:16 AM interpreted by me the same time, normal sinus rhythm with a ventricular rate of 73 bpm, remaining portion intervals within normal limits  No diffuse elevations to indicate pericarditis  No coved ST elevations greater than 2mm with negative T waves in V1-3 to indicate concern for brugada  No biphasic T waves in V2, V3 to indicate Wellens (critical stenosis of LAD)  No elevation in aVR or deviation when compared to V1 (can be associated with ST depression in I,II, V4-6 when left main occlusion is present)  ED Course  ED Course as of 06/12/23 0713   Mon Jun 12, 2023   9006 Patient seen and evaluated, orders placed, resolved chest pain in a nontoxic-appearing 75-year-old female, no ACS associated symptoms  Differential diagnosis in this patient is as follows:  Atypical chest pain versus gastritis "secondary to chronic alcohol consumption  9192 Hemoglobin is 10 7 with hematocrit of 34 1, normal platelets, peers that patient's hemoglobin adequate is at baseline compared to prior lab results  8606 She is reassessed, offering no other complaints, explained to her that she will need to follow-up with her ECP patient is reassessed, still having no abdominal pain, chest pain shortness of breath, stable for discharge, for set of cardiac enzymes for within normal EKG, patient's alcohol level is 220, is clinically desiccated does not have the ability to call for a ride because her boyfriend 9 years does not drive  5604 Patient signed out to Dr Carmelo Christensen  HEART Risk Score    Flowsheet Row Most Recent Value   Heart Score Risk Calculator    History 0 Filed at: 06/12/2023 0650   ECG 0 Filed at: 06/12/2023 0650   Age 1 Filed at: 06/12/2023 0650   Risk Factors 0 Filed at: 06/12/2023 0650   Troponin 0 Filed at: 06/12/2023 0650   HEART Score 1 Filed at: 06/12/2023 4235                                      Medical Decision Making  Atypical chest pain and resolved within less than 5 minutes with no ACS-like symptoms, set of cardiac enzymes in normal limits, low risk heart score, EKG unremarkable, patient stable for discharge upon sobriety  Portions of the record may have been created with voice recognition software  Occasional wrong word or \"sound a like\" substitutions may have occurred due to the inherent limitations of voice recognition software  Read the chart carefully and recognize, using context, where substitutions have occurred  Counseling: I had a detailed discussion with the patient and/or guardian regarding: the historical points, exam findings, and any diagnostic results supporting the discharge diagnosis, lab results, radiology results, discharge instructions reviewed with patient and/or family/caregiver and understanding was verbalized   Instructions given to return to the emergency department " if symptoms worsen or persist, or if there are any questions or concerns that arise at home      Amount and/or Complexity of Data Reviewed  Labs: ordered  Radiology: ordered and independent interpretation performed  Disposition  Final diagnoses:   Atypical chest pain   Alcohol abuse     Time reflects when diagnosis was documented in both MDM as applicable and the Disposition within this note     Time User Action Codes Description Comment    6/12/2023  6:43 AM Lorry Bumpers Add [R07 89] Atypical chest pain     6/12/2023  6:44 AM Lorry Bumpers Add [F10 10] Alcohol abuse       ED Disposition     None      Follow-up Information    None         Patient's Medications   Discharge Prescriptions    No medications on file       No discharge procedures on file      PDMP Review     None          ED Provider  Electronically Signed by           Andrew Chowdary III, DO  06/12/23 3696

## 2023-07-11 ENCOUNTER — HOSPITAL ENCOUNTER (EMERGENCY)
Facility: HOSPITAL | Age: 48
Discharge: HOME | End: 2023-07-12
Attending: EMERGENCY MEDICINE | Admitting: EMERGENCY MEDICINE
Payer: COMMERCIAL

## 2023-07-11 DIAGNOSIS — F32.A DEPRESSION: Primary | ICD-10-CM

## 2023-07-11 DIAGNOSIS — F41.9 ANXIETY: ICD-10-CM

## 2023-07-11 DIAGNOSIS — Z72.89 SELF-INJURIOUS BEHAVIOR: ICD-10-CM

## 2023-07-11 PROBLEM — F99 PSYCHIATRIC DISORDER: Status: ACTIVE | Noted: 2023-07-11

## 2023-07-11 LAB
AMPHETAMINES SERPL QL SCN: NEGATIVE
BARBITURATES UR QL: NEGATIVE
BENZODIAZ UR QL: NEGATIVE
COCAINE UR QL: NEGATIVE
ETHANOL EXG-MCNC: 0.2 MG/DL
EXT PREGNANCY TEST URINE: NEGATIVE
EXT. CONTROL: NORMAL
METHADONE UR QL: NEGATIVE
OPIATES UR QL SCN: NEGATIVE
OXYCODONE+OXYMORPHONE UR QL SCN: NEGATIVE
PCP UR QL: NEGATIVE
THC UR QL: NEGATIVE

## 2023-07-11 PROCEDURE — 81025 URINE PREGNANCY TEST: CPT | Performed by: EMERGENCY MEDICINE

## 2023-07-11 PROCEDURE — 99283 EMERGENCY DEPT VISIT LOW MDM: CPT

## 2023-07-11 PROCEDURE — 80307 DRUG TEST PRSMV CHEM ANLYZR: CPT | Performed by: EMERGENCY MEDICINE

## 2023-07-11 PROCEDURE — 82075 ASSAY OF BREATH ETHANOL: CPT | Performed by: EMERGENCY MEDICINE

## 2023-07-12 VITALS
DIASTOLIC BLOOD PRESSURE: 87 MMHG | OXYGEN SATURATION: 96 % | HEART RATE: 86 BPM | RESPIRATION RATE: 18 BRPM | SYSTOLIC BLOOD PRESSURE: 143 MMHG

## 2023-07-12 LAB
ATRIAL RATE: 90 BPM
P AXIS: 69 DEGREES
PR INTERVAL: 168 MS
QRS AXIS: 69 DEGREES
QRSD INTERVAL: 90 MS
QT INTERVAL: 366 MS
QTC INTERVAL: 447 MS
T WAVE AXIS: 56 DEGREES
VENTRICULAR RATE: 90 BPM

## 2023-07-12 PROCEDURE — 93005 ELECTROCARDIOGRAM TRACING: CPT

## 2023-07-12 PROCEDURE — 93010 ELECTROCARDIOGRAM REPORT: CPT | Performed by: INTERNAL MEDICINE

## 2023-07-12 RX ORDER — LORAZEPAM 1 MG/1
1 TABLET ORAL ONCE
Status: COMPLETED | OUTPATIENT
Start: 2023-07-12 | End: 2023-07-12

## 2023-07-12 RX ADMIN — LORAZEPAM 1 MG: 1 TABLET ORAL at 00:24

## 2023-07-12 NOTE — ED NOTES
This writer discussed the patients current presentation and recommended discharge plan with   They agree with the patient being discharged at this time with referrals and/or information for outpatient D&A and mental health services. The patient was Instructed to follow up with their PCP  The patient was provided with referral information for: outpatient D&A and mental health services     This writer and the patient completed a safety plan. The patient was provided with a copy of their safety plan with encouragement to utilize the plan following discharge. In addition, the patient was instructed to call local [facility] crisis, other crisis services, [facility] or to go to the nearest ER immediately if their situation changes at any time. This writer discussed discharge plans with the patient, who agrees with and understands the discharge plans.          SAFETY PLAN  Warning Signs (thoughts, images, mood, behavior, situations) of a potential crisis: increased depression and anxiety       Coping Skills (what can I do to take my mind off the problem, or to keep myself safe): talking to my family, watching tv, going for a walk      Outside Support (who can I reach out to for support and help): my mother and brother         National Suicide Prevention Hotline:  [address] [place] [address] Drive [address]: [address]: [address] [place] [address] [address] [phone] - Crisis   [phone] - Peer Support Talk Line (1-9pm daily)  [phone] - Teen Support Talk Line (1-9pm daily)  [address] [place] [address] [address] [place] [address] [address] [place] [address]) [phone] [facility]

## 2023-07-12 NOTE — ED PROVIDER NOTES
History  No chief complaint on file. 51 YO F     PMH:   Schizophrenia  Bipolar 1 D/o   Mild asthma   Hep C   Alcohol use d/o   Pre-diabetes   Severe Obesity    Brought by EMS  Pt states she called EMS bc she was anxious and punching herself in the head    Despite the self injury pta, she denies HI/SI    Pt states she is "not on psych meds"  States she has been off meds for several years      SHE ADMITS TO ETOH - 1 case of beer  DRUG USE: NONE      PRIOR SUICIDE ATTEMPTS:  NONE   PRIOR PSYCHIATRIC HOSPITALIZATIONS:  In past       TRAUMA: NONE  RECENT ILLNESS: NONE      PT HAS NO SOMATIC COMPLAINTS         History provided by:  Patient  Anxiety  Presenting symptoms: agitation and depression    Presenting symptoms: no suicidal thoughts    Presenting symptoms comment:  Self injury   Degree of incapacity (severity): Moderate  Context: alcohol use and noncompliance    Context: not drug abuse    Relieved by:  Nothing  Worsened by:  Nothing  Ineffective treatments:  None tried  Associated symptoms: anxiety    Associated symptoms: no abdominal pain, no chest pain, no fatigue and no headaches        Prior to Admission Medications   Prescriptions Last Dose Informant Patient Reported? Taking?    albuterol (ProAir HFA) 90 mcg/act inhaler   No No   Sig: Inhale 2 puffs every 6 (six) hours as needed for wheezing or shortness of breath   docusate sodium (COLACE) 100 mg capsule   Yes No   Sig: Take 100 mg by mouth 2 (two) times a day   dulaglutide (Trulicity) 1.5 ZI/8.8ZR injection   No No   Sig: Inject 0.5 mL (1.5 mg total) under the skin every 7 days   folic acid (FOLVITE) 1 mg tablet   Yes No   Sig: Take by mouth daily   furosemide (LASIX) 40 mg tablet   Yes No   Sig: TAKE 1 TABLET BY MOUTH EACH MORNING.   metoprolol tartrate (LOPRESSOR) 50 mg tablet   No No   Sig: Take 1 tablet (50 mg total) by mouth every 12 (twelve) hours   mometasone-formoterol (Dulera) 100-5 MCG/ACT inhaler   No No   Sig: Inhale 2 puffs 2 (two) times a day Rinse mouth after use.   montelukast (SINGULAIR) 10 mg tablet   No No   Sig: Take 1 tablet (10 mg total) by mouth daily at bedtime   omeprazole (PriLOSEC) 20 mg delayed release capsule   No No   Sig: Take 1 capsule (20 mg total) by mouth daily   potassium chloride (Klor-Con) 10 mEq tablet   Yes No   Sig: Take 10 mEq by mouth 2 (two) times a day   vitamin B-12 (VITAMIN B-12) 1,000 mcg tablet   Yes No   Sig: Take by mouth daily      Facility-Administered Medications: None       No past medical history on file. No past surgical history on file. Family History   Problem Relation Age of Onset   • Diabetes Mother    • Diabetes Brother      I have reviewed and agree with the history as documented. E-Cigarette/Vaping   • E-Cigarette Use Never User      E-Cigarette/Vaping Substances     Social History     Tobacco Use   • Smoking status: Some Days     Packs/day: 0.50     Years: 30.00     Total pack years: 15.00     Types: Cigarettes   Vaping Use   • Vaping Use: Never used   Substance Use Topics   • Alcohol use: Yes     Alcohol/week: 40.0 standard drinks of alcohol     Types: 40 Cans of beer per week   • Drug use: Not Currently       Review of Systems   Constitutional: Negative for chills, diaphoresis, fatigue and fever. HENT: Negative for ear discharge, ear pain, facial swelling, rhinorrhea, sinus pressure, sinus pain, sneezing, sore throat, trouble swallowing and voice change. Respiratory: Negative for cough, chest tightness, shortness of breath, wheezing and stridor. Cardiovascular: Negative for chest pain, palpitations and leg swelling. Gastrointestinal: Negative for abdominal distention, abdominal pain, blood in stool, constipation, diarrhea, nausea and vomiting. Genitourinary: Negative for difficulty urinating, dysuria, flank pain and frequency. Musculoskeletal: Negative for arthralgias, back pain, gait problem, joint swelling, myalgias, neck pain and neck stiffness.    Skin: Negative for rash and wound. Neurological: Negative for dizziness, light-headedness and headaches. Psychiatric/Behavioral: Positive for agitation. Negative for suicidal ideas. The patient is nervous/anxious. All other systems reviewed and are negative. Physical Exam  Physical Exam  Constitutional:       General: She is not in acute distress. Appearance: Normal appearance. She is well-developed. She is not ill-appearing, toxic-appearing or diaphoretic. HENT:      Head: Normocephalic and atraumatic. Right Ear: External ear normal.      Left Ear: External ear normal.      Nose: Nose normal. No congestion or rhinorrhea. Mouth/Throat:      Pharynx: No oropharyngeal exudate or posterior oropharyngeal erythema. Eyes:      General: No scleral icterus. Right eye: No discharge. Left eye: No discharge. Extraocular Movements: Extraocular movements intact. Conjunctiva/sclera: Conjunctivae normal.      Pupils: Pupils are equal, round, and reactive to light. Neck:      Vascular: No JVD. Trachea: No tracheal deviation. Cardiovascular:      Rate and Rhythm: Normal rate and regular rhythm. Pulses: Normal pulses. Heart sounds: Normal heart sounds. No murmur heard. No friction rub. No gallop. Pulmonary:      Effort: Pulmonary effort is normal. No respiratory distress. Breath sounds: Normal breath sounds. No stridor. No wheezing, rhonchi or rales. Chest:      Chest wall: No tenderness. Abdominal:      General: Bowel sounds are normal. There is no distension. Palpations: Abdomen is soft. There is no mass. Tenderness: There is no abdominal tenderness. There is no right CVA tenderness, left CVA tenderness, guarding or rebound. Hernia: No hernia is present. Musculoskeletal:         General: No swelling, tenderness, deformity or signs of injury. Normal range of motion. Cervical back: Normal range of motion and neck supple. No rigidity or tenderness. Right lower leg: No edema. Left lower leg: No edema. Lymphadenopathy:      Cervical: No cervical adenopathy. Skin:     General: Skin is warm. Capillary Refill: Capillary refill takes less than 2 seconds. Coloration: Skin is not jaundiced or pale. Findings: No bruising, erythema, lesion or rash. Neurological:      General: No focal deficit present. Mental Status: She is alert and oriented to person, place, and time. Cranial Nerves: No cranial nerve deficit. Sensory: No sensory deficit. Motor: No weakness or abnormal muscle tone. Coordination: Coordination normal.   Psychiatric:      Comments: Mildly intoxicated  Tearful affect          Vital Signs  ED Triage Vitals   Temp Pulse Resp BP SpO2   -- -- -- -- --      Temp src Heart Rate Source Patient Position - Orthostatic VS BP Location FiO2 (%)   -- -- -- -- --      Pain Score       --           There were no vitals filed for this visit.       Visual Acuity      ED Medications  Medications - No data to display    Diagnostic Studies  Results Reviewed     None                 No orders to display              Procedures  ECG 12 Lead Documentation Only    Date/Time: 7/12/2023 12:23 AM    Performed by: Lin Ashby MD  Authorized by: Lin Ashby MD    Indications / Diagnosis:  Psych eval  ECG reviewed by me, the ED Provider: yes    Patient location:  ED  Interpretation:     Interpretation: normal    Rate:     ECG rate:  90    ECG rate assessment: normal    Rhythm:     Rhythm: sinus rhythm    Ectopy:     Ectopy: none    QRS:     QRS axis:  Normal  Conduction:     Conduction: normal    ST segments:     ST segments:  Non-specific  T waves:     T waves: non-specific               ED Course  ED Course as of 07/12/23 0007   Tue Jul 11, 2023   2302 Pt brought by EMS for ETOH and SI  Pt getting triaged now    [address], Pt's Mother     She states "she always smacks herself and says no one cares about her"  She has not made suicidal statements     She affirms that the pt has been off her meds for years   Wed Jul 12, 2023 0007 EXTBreath Alcohol: 0.202 0007 Rapid drug screen, urine   0007 PREGNANCY TEST URINE: Negative                                             Medical Decision Making    51 yo F  Here for anxiety, self injurious behavior and alcohol intoxication     Pt to be evaluated by Crisis for possible 201 for psych admission for psychiatric stabilization and medication adjustment    Anxiety: acute illness or injury  Depression: acute illness or injury  Self-injurious behavior: acute illness or injury  Amount and/or Complexity of Data Reviewed  Labs: ordered. Decision-making details documented in ED Course. Disposition  Final diagnoses:   None     ED Disposition     None      Follow-up Information    None         Patient's Medications   Discharge Prescriptions    No medications on file       No discharge procedures on file.     PDMP Review     None          ED Provider  Electronically Signed by           Wisam Richardson MD  07/12/23 0009       Wisam Richardson MD  07/12/23 6889

## 2023-07-12 NOTE — ED NOTES
Met with patient and completed the crisis intake assessment as well as the safety risk assessment. Patient arrived to the ER via EMS," I was drinking and hitting myself in the head because I was angry. So my family called 911." Patient at time of assessment was at legal limits, at time of arrival ETOH was 202. Patient maintained eye contact throughout assessment and speech was within normal limits. Patient denies SI/HI as well as hallucinations. Patient reports stable appetite, concentration and motivation. " I often have a hard time falling asleep."     Patient at current denies wanting inpatient treatment at this time, case reviewed with ER MD who is in agreement with discharge home and outpatient follow up.      Patient was educated if symptoms continue or worsen to call 911 or go to the nearest ER and is in agreement to do as such.,

## 2023-07-13 ENCOUNTER — HOSPITAL ENCOUNTER (EMERGENCY)
Facility: HOSPITAL | Age: 48
Discharge: HOME | End: 2023-07-14
Attending: EMERGENCY MEDICINE
Payer: COMMERCIAL

## 2023-07-13 DIAGNOSIS — N39.0 UTI (URINARY TRACT INFECTION): Primary | ICD-10-CM

## 2023-07-13 PROCEDURE — 80053 COMPREHEN METABOLIC PANEL: CPT | Performed by: EMERGENCY MEDICINE

## 2023-07-13 PROCEDURE — 84703 CHORIONIC GONADOTROPIN ASSAY: CPT | Performed by: EMERGENCY MEDICINE

## 2023-07-13 PROCEDURE — 99284 EMERGENCY DEPT VISIT MOD MDM: CPT

## 2023-07-13 PROCEDURE — 83690 ASSAY OF LIPASE: CPT | Performed by: EMERGENCY MEDICINE

## 2023-07-13 PROCEDURE — 84702 CHORIONIC GONADOTROPIN TEST: CPT | Performed by: EMERGENCY MEDICINE

## 2023-07-13 PROCEDURE — 85025 COMPLETE CBC W/AUTO DIFF WBC: CPT | Performed by: EMERGENCY MEDICINE

## 2023-07-13 PROCEDURE — 36415 COLL VENOUS BLD VENIPUNCTURE: CPT | Performed by: EMERGENCY MEDICINE

## 2023-07-14 ENCOUNTER — APPOINTMENT (EMERGENCY)
Dept: CT IMAGING | Facility: HOSPITAL | Age: 48
End: 2023-07-14
Payer: COMMERCIAL

## 2023-07-14 VITALS
DIASTOLIC BLOOD PRESSURE: 57 MMHG | OXYGEN SATURATION: 97 % | HEART RATE: 96 BPM | RESPIRATION RATE: 16 BRPM | SYSTOLIC BLOOD PRESSURE: 110 MMHG

## 2023-07-14 LAB
ALBUMIN SERPL BCP-MCNC: 3.7 G/DL (ref 3.5–5)
ALP SERPL-CCNC: 139 U/L (ref 34–104)
ALT SERPL W P-5'-P-CCNC: 18 U/L (ref 7–52)
ANION GAP SERPL CALCULATED.3IONS-SCNC: 12 MMOL/L
AST SERPL W P-5'-P-CCNC: 25 U/L (ref 13–39)
B-HCG SERPL-ACNC: 5 MIU/ML (ref 0–5)
BACTERIA UR QL AUTO: ABNORMAL /HPF
BASOPHILS # BLD AUTO: 0.09 THOUSANDS/ÂΜL (ref 0–0.1)
BASOPHILS NFR BLD AUTO: 2 % (ref 0–1)
BILIRUB SERPL-MCNC: 0.23 MG/DL (ref 0.2–1)
BILIRUB UR QL STRIP: NEGATIVE
BUN SERPL-MCNC: 18 MG/DL (ref 5–25)
CALCIUM SERPL-MCNC: 8.2 MG/DL (ref 8.4–10.2)
CHLORIDE SERPL-SCNC: 106 MMOL/L (ref 96–108)
CLARITY UR: CLEAR
CO2 SERPL-SCNC: 20 MMOL/L (ref 21–32)
COLOR UR: ABNORMAL
CREAT SERPL-MCNC: 1.08 MG/DL (ref 0.6–1.3)
EOSINOPHIL # BLD AUTO: 0.14 THOUSAND/ÂΜL (ref 0–0.61)
EOSINOPHIL NFR BLD AUTO: 2 % (ref 0–6)
ERYTHROCYTE [DISTWIDTH] IN BLOOD BY AUTOMATED COUNT: 15.9 % (ref 11.6–15.1)
ETHANOL EXG-MCNC: 0.12 MG/DL
ETHANOL EXG-MCNC: 0.18 MG/DL
GFR SERPL CREATININE-BSD FRML MDRD: 60 ML/MIN/1.73SQ M
GLUCOSE SERPL-MCNC: 99 MG/DL (ref 65–140)
GLUCOSE UR STRIP-MCNC: NEGATIVE MG/DL
HCG SERPL QL: ABNORMAL
HCT VFR BLD AUTO: 36.9 % (ref 34.8–46.1)
HGB BLD-MCNC: 11.6 G/DL (ref 11.5–15.4)
HGB UR QL STRIP.AUTO: NEGATIVE
IMM GRANULOCYTES # BLD AUTO: 0.01 THOUSAND/UL (ref 0–0.2)
IMM GRANULOCYTES NFR BLD AUTO: 0 % (ref 0–2)
KETONES UR STRIP-MCNC: NEGATIVE MG/DL
LEUKOCYTE ESTERASE UR QL STRIP: ABNORMAL
LIPASE SERPL-CCNC: 18 U/L (ref 11–82)
LYMPHOCYTES # BLD AUTO: 2.26 THOUSANDS/ÂΜL (ref 0.6–4.47)
LYMPHOCYTES NFR BLD AUTO: 37 % (ref 14–44)
MCH RBC QN AUTO: 27.5 PG (ref 26.8–34.3)
MCHC RBC AUTO-ENTMCNC: 31.4 G/DL (ref 31.4–37.4)
MCV RBC AUTO: 87 FL (ref 82–98)
MONOCYTES # BLD AUTO: 0.42 THOUSAND/ÂΜL (ref 0.17–1.22)
MONOCYTES NFR BLD AUTO: 7 % (ref 4–12)
NEUTROPHILS # BLD AUTO: 3.17 THOUSANDS/ÂΜL (ref 1.85–7.62)
NEUTS SEG NFR BLD AUTO: 52 % (ref 43–75)
NITRITE UR QL STRIP: POSITIVE
NON-SQ EPI CELLS URNS QL MICRO: ABNORMAL /HPF
NRBC BLD AUTO-RTO: 0 /100 WBCS
PH UR STRIP.AUTO: 6 [PH]
PLATELET # BLD AUTO: 308 THOUSANDS/UL (ref 149–390)
PMV BLD AUTO: 9.9 FL (ref 8.9–12.7)
POTASSIUM SERPL-SCNC: 3.8 MMOL/L (ref 3.5–5.3)
PROT SERPL-MCNC: 6.4 G/DL (ref 6.4–8.4)
PROT UR STRIP-MCNC: NEGATIVE MG/DL
RBC # BLD AUTO: 4.22 MILLION/UL (ref 3.81–5.12)
RBC #/AREA URNS AUTO: ABNORMAL /HPF
SODIUM SERPL-SCNC: 138 MMOL/L (ref 135–147)
SP GR UR STRIP.AUTO: 1.01
UROBILINOGEN UR QL STRIP.AUTO: 0.2 E.U./DL
WBC # BLD AUTO: 6.09 THOUSAND/UL (ref 4.31–10.16)
WBC #/AREA URNS AUTO: ABNORMAL /HPF

## 2023-07-14 PROCEDURE — 82075 ASSAY OF BREATH ETHANOL: CPT | Performed by: EMERGENCY MEDICINE

## 2023-07-14 PROCEDURE — G1004 CDSM NDSC: HCPCS

## 2023-07-14 PROCEDURE — 96365 THER/PROPH/DIAG IV INF INIT: CPT

## 2023-07-14 PROCEDURE — 74177 CT ABD & PELVIS W/CONTRAST: CPT

## 2023-07-14 PROCEDURE — 81001 URINALYSIS AUTO W/SCOPE: CPT | Performed by: EMERGENCY MEDICINE

## 2023-07-14 RX ORDER — CEFTRIAXONE 1 G/50ML
1000 INJECTION, SOLUTION INTRAVENOUS ONCE
Status: COMPLETED | OUTPATIENT
Start: 2023-07-14 | End: 2023-07-14

## 2023-07-14 RX ORDER — CEPHALEXIN 500 MG/1
500 CAPSULE ORAL EVERY 6 HOURS SCHEDULED
Qty: 28 CAPSULE | Refills: 0 | Status: SHIPPED | OUTPATIENT
Start: 2023-07-14 | End: 2023-07-21

## 2023-07-14 RX ADMIN — CEFTRIAXONE 1000 MG: 1 INJECTION, SOLUTION INTRAVENOUS at 01:25

## 2023-07-14 RX ADMIN — IOHEXOL 100 ML: 350 INJECTION, SOLUTION INTRAVENOUS at 00:52

## 2023-07-14 NOTE — ED PROVIDER NOTES
History  Chief Complaint   Patient presents with   • Abdominal Pain     Pt reports not eating today; pt reports drinking beer since 11am; pt reports that she drank almost a full 12 pack and that she had "3 beers left"     Patient is a 42-year-old female with history of psychiatric disease, alcohol abuse who presents for evaluation of abdominal pain. Patient says that she had 9-10 beers today. She says that she developed some epigastric abdominal discomfort and 2-3 episodes of nausea vomiting. No blood noted in the vomit. She has not taken thing for the pain. She has not had pain like this before in the past.  She denies diarrhea melena or hematochezia. No urinary complaints. Patient is initially sleeping upon my ED evaluation. Prior to Admission Medications   Prescriptions Last Dose Informant Patient Reported? Taking?    albuterol (ProAir HFA) 90 mcg/act inhaler   No No   Sig: Inhale 2 puffs every 6 (six) hours as needed for wheezing or shortness of breath   docusate sodium (COLACE) 100 mg capsule   Yes No   Sig: Take 100 mg by mouth 2 (two) times a day   dulaglutide (Trulicity) 1.5 MF/4.4QS injection   No No   Sig: Inject 0.5 mL (1.5 mg total) under the skin every 7 days   folic acid (FOLVITE) 1 mg tablet   Yes No   Sig: Take by mouth daily   furosemide (LASIX) 40 mg tablet   Yes No   Sig: TAKE 1 TABLET BY MOUTH EACH MORNING.   metoprolol tartrate (LOPRESSOR) 50 mg tablet   No No   Sig: Take 1 tablet (50 mg total) by mouth every 12 (twelve) hours   mometasone-formoterol (Dulera) 100-5 MCG/ACT inhaler   No No   Sig: Inhale 2 puffs 2 (two) times a day Rinse mouth after use.   montelukast (SINGULAIR) 10 mg tablet   No No   Sig: Take 1 tablet (10 mg total) by mouth daily at bedtime   omeprazole (PriLOSEC) 20 mg delayed release capsule   No No   Sig: Take 1 capsule (20 mg total) by mouth daily   potassium chloride (Klor-Con) 10 mEq tablet   Yes No   Sig: Take 10 mEq by mouth 2 (two) times a day   vitamin B-12 (VITAMIN B-12) 1,000 mcg tablet   Yes No   Sig: Take by mouth daily      Facility-Administered Medications: None       Past Medical History:   Diagnosis Date   • Diabetes mellitus ([address])    • Hypertension    • Psychiatric disorder        History reviewed. No pertinent surgical history. Family History   Problem Relation Age of Onset   • Diabetes Mother    • Diabetes Brother      I have reviewed and agree with the history as documented. E-Cigarette/Vaping   • E-Cigarette Use Never User      E-Cigarette/Vaping Substances   • Nicotine No    • THC No    • CBD No    • Flavoring No    • Other No    • Unknown No      Social History     Tobacco Use   • Smoking status: Some Days     Packs/day: 0.50     Years: 30.00     Total pack years: 15.00     Types: Cigarettes   Vaping Use   • Vaping Use: Never used   Substance Use Topics   • Alcohol use: Yes     Alcohol/week: 40.0 standard drinks of alcohol     Types: 40 Cans of beer per week   • Drug use: Not Currently       Review of Systems   Constitutional: Negative for fever. HENT: Negative for sore throat. Respiratory: Negative for shortness of breath. Cardiovascular: Negative for chest pain. Gastrointestinal: Positive for abdominal pain, nausea and vomiting. Genitourinary: Negative for dysuria. Musculoskeletal: Negative for back pain. Skin: Negative for rash. Neurological: Negative for light-headedness. Psychiatric/Behavioral: Negative for agitation. All other systems reviewed and are negative. Physical Exam  Physical Exam  Vitals reviewed. Constitutional:       General: She is not in acute distress. Appearance: She is well-developed. HENT:      Head: Normocephalic. Eyes:      Pupils: Pupils are equal, round, and reactive to light. Cardiovascular:      Rate and Rhythm: Normal rate and regular rhythm. Heart sounds: Normal heart sounds.    Pulmonary:      Effort: Pulmonary effort is normal.      Breath sounds: Normal breath sounds. Abdominal:      General: Bowel sounds are normal. There is no distension. Palpations: Abdomen is soft. Tenderness: There is no abdominal tenderness. There is no guarding. Comments: No significant abdominal tenderness on exam   Musculoskeletal:         General: No tenderness or deformity. Normal range of motion. Cervical back: Normal range of motion and neck supple. Comments: No CVA tenderness noted   Skin:     General: Skin is warm and dry. Capillary Refill: Capillary refill takes less than 2 seconds. Neurological:      Mental Status: She is alert and oriented to person, place, and time. Cranial Nerves: No cranial nerve deficit. Sensory: No sensory deficit. Psychiatric:         Behavior: Behavior normal.         Thought Content:  Thought content normal.         Judgment: Judgment normal.         Vital Signs  ED Triage Vitals [07/13/23 2306]   Temp Pulse Respirations Blood Pressure SpO2   -- 88 16 126/76 95 %      Temp src Heart Rate Source Patient Position - Orthostatic VS BP Location FiO2 (%)   -- Monitor Lying Right arm --      Pain Score       No Pain           Vitals:    07/14/23 0029 07/14/23 0059 07/14/23 0400 07/14/23 0600   BP: 119/81 140/74 119/61 110/57   Pulse: 88 92 96 96   Patient Position - Orthostatic VS:             Visual Acuity      ED Medications  Medications   iohexol (OMNIPAQUE) 350 MG/ML injection (SINGLE-DOSE) 100 mL (100 mL Intravenous Given 7/14/23 0052)   cefTRIAXone (ROCEPHIN) IVPB (premix in dextrose) 1,000 mg 50 mL (0 mg Intravenous Stopped 7/14/23 0155)       Diagnostic Studies  Results Reviewed     Procedure Component Value Units Date/Time    POCT alcohol breath test [[id number]]  (Normal) Resulted: 07/14/23 0603    Lab Status: Final result Updated: 07/14/23 0603     EXTBreath Alcohol 0.124    POCT alcohol breath test [[id number]]  (Normal) Resulted: 07/14/23 0207    Lab Status: Final result Updated: 07/14/23 0207     EXTBreath Alcohol 0.185    Urine Microscopic [[id number]]  (Abnormal) Collected: 07/14/23 0042    Lab Status: Final result Specimen: Urine, Clean Catch Updated: 07/14/23 0119     RBC, UA 0-1 /hpf      WBC, UA 20-30 /hpf      Epithelial Cells Occasional /hpf      Bacteria, UA Moderate /hpf     UA (URINE) with reflex to Scope [[id number]]  (Abnormal) Collected: 07/14/23 0042    Lab Status: Final result Specimen: Urine, Clean Catch Updated: 07/14/23 0054     Color, UA Straw     Clarity, UA Clear     Specific Gravity, UA 1.010     pH, UA 6.0     Leukocytes, UA 3+     Nitrite, UA Positive     Protein, UA Negative mg/dl      Glucose, UA Negative mg/dl      Ketones, UA Negative mg/dl      Urobilinogen, UA 0.2 E.U./dl      Bilirubin, UA Negative     Occult Blood, UA Negative    hCG, quantitative [[id number]]  (Normal) Collected: 07/13/23 2356    Lab Status: Final result Specimen: Blood from Arm, Right Updated: 07/14/23 0051     HCG, Quant 5 mIU/mL     Narrative:       Expected Ranges:    HCG results between 5 and 25 mIU/mL may be indicative of early pregnancy but should be interpreted in light of the total clinical presentation. HCG can rise to detectable levels in julio césar and post menopausal women (0-11.6 mIU/mL).      Approximate               Approximate HCG  Gestation age          Concentration ( mIU/mL)  _____________          ______________________   [facility]                      HCG values  0.2-1                       5-50  1-2                           2-3                         100-5000  3-4                         500-19713  4-5                         1000-02555  5-6                         [id number]  6-8                         [id number]  8-12                        [id number]      hCG, qualitative pregnancy [[id number]]  (Abnormal) Collected: 07/13/23 2356    Lab Status: Final result Specimen: Blood from Arm, Right Updated: 07/14/23 0027     Preg, Serum Borderline    CMP [[id number]]  (Abnormal) Collected: 07/13/23 2356 Lab Status: Final result Specimen: Blood from Arm, Right Updated: 07/14/23 0019     Sodium 138 mmol/L      Potassium 3.8 mmol/L      Chloride 106 mmol/L      CO2 20 mmol/L      ANION GAP 12 mmol/L      BUN 18 mg/dL      Creatinine 1.08 mg/dL      Glucose 99 mg/dL      Calcium 8.2 mg/dL      AST 25 U/L      ALT 18 U/L      Alkaline Phosphatase 139 U/L      Total Protein 6.4 g/dL      Albumin 3.7 g/dL      Total Bilirubin 0.23 mg/dL      eGFR 60 ml/min/1.73sq m     Narrative:      National Kidney Disease Foundation guidelines for Chronic Kidney Disease (CKD):   •  Stage 1 with normal or high GFR (GFR > 90 mL/min/1.73 square meters)  •  Stage 2 Mild CKD (GFR = 60-89 mL/min/1.73 square meters)  •  Stage 3A Moderate CKD (GFR = 45-59 mL/min/1.73 square meters)  •  Stage 3B Moderate CKD (GFR = 30-44 mL/min/1.73 square meters)  •  Stage 4 Severe CKD (GFR = 15-29 mL/min/1.73 square meters)  •  Stage 5 End Stage CKD (GFR <15 mL/min/1.73 square meters)  Note: GFR calculation is accurate only with a steady state creatinine    Lipase [[id number]]  (Normal) Collected: 07/13/23 2356    Lab Status: Final result Specimen: Blood from Arm, Right Updated: 07/14/23 0019     Lipase 18 u/L     CBC and differential [[id number]]  (Abnormal) Collected: 07/13/23 2356    Lab Status: Final result Specimen: Blood from Arm, Right Updated: 07/14/23 0002     WBC 6.09 Thousand/uL      RBC 4.22 Million/uL      Hemoglobin 11.6 g/dL      Hematocrit 36.9 %      MCV 87 fL      MCH 27.5 pg      MCHC 31.4 g/dL      RDW 15.9 %      MPV 9.9 fL      Platelets 959 Thousands/uL      nRBC 0 /100 WBCs      Neutrophils Relative 52 %      Immat GRANS % 0 %      Lymphocytes Relative 37 %      Monocytes Relative 7 %      Eosinophils Relative 2 %      Basophils Relative 2 %      Neutrophils Absolute 3.17 Thousands/µL      Immature Grans Absolute 0.01 Thousand/uL      Lymphocytes Absolute 2.26 Thousands/µL      Monocytes Absolute 0.42 Thousand/µL      Eosinophils Absolute 0.14 Thousand/µL      Basophils Absolute 0.09 Thousands/µL                  CT abdomen pelvis with contrast   Final Result by Pérez Smart DO (07/14 0142)      No acute abnormality or suspicious mass            Workstation performed: [id number]                    Procedures  Procedures         ED Course                               SBIRT 22yo+    Flowsheet Row Most Recent Value   Initial Alcohol Screen: US AUDIT-C     1. How often do you have a drink containing alcohol? 6 Filed at: 07/13/2023 2302   Audit-C Score 6 Filed at: 07/13/2023 2302                    Medical Decision Making  Patient is a 49-year-old female who presents for evaluation of epigastric abdominal discomfort after drinking throughout the day today. Patient with unimpressive abdominal exam.  Hemodynamically stable. Initial concern for pancreatitis, gastritis, UTI. Lab work reviewed by myself and unremarkable. Urinalysis consistent with urinary tract infection. CT scan reviewed myself and no acute abnormalities are seen. Started with antibiotics for UTI. Patient initially intoxicated, signed out pending sobriety and ride home. Amount and/or Complexity of Data Reviewed  Labs: ordered. Radiology: ordered. Risk  Prescription drug management. Disposition  Final diagnoses:   UTI (urinary tract infection)     Time reflects when diagnosis was documented in both MDM as applicable and the Disposition within this note     Time User Action Codes Description Comment    7/14/2023  1:42 AM Abi Andrew Add [N39.0] UTI (urinary tract infection)       ED Disposition     ED Disposition   Discharge    Condition   Stable    Date/Time   Fri Jul 14, 2023  1:49 AM    Comment   Estevan Estimable Robles discharge to home/self care.                Follow-up Information     Follow up With Specialties Details Why [facility] Emergency Department Emergency Medicine  If symptoms worsen [address] [place] [id number]  [address] [place] [address] Emergency Department, [address], Shirlene Beltr[place], [address]    Jesus Lott MD Internal Medicine Schedule an appointment as soon as possible for a visit in 2 days  [address] [facility]  [phone]             Discharge Medication List as of 7/14/2023  1:49 AM      START taking these medications    Details   cephalexin (KEFLEX) 500 mg capsule Take 1 capsule (500 mg total) by mouth every 6 (six) hours for 7 days, Starting Fri 7/14/2023, Until Fri 7/21/2023, Print         CONTINUE these medications which have NOT CHANGED    Details   albuterol (ProAir HFA) 90 mcg/act inhaler Inhale 2 puffs every 6 (six) hours as needed for wheezing or shortness of breath, Starting Wed 2/15/2023, Normal      docusate sodium (COLACE) 100 mg capsule Take 100 mg by mouth 2 (two) times a day, Historical Med      dulaglutide (Trulicity) 1.5 ZU/3.7JR injection Inject 0.5 mL (1.5 mg total) under the skin every 7 days, Starting Mon 1/49/8707, Normal      folic acid (FOLVITE) 1 mg tablet Take by mouth daily, Historical Med      furosemide (LASIX) 40 mg tablet TAKE 1 TABLET BY MOUTH EACH MORNING., Historical Med      metoprolol tartrate (LOPRESSOR) 50 mg tablet Take 1 tablet (50 mg total) by mouth every 12 (twelve) hours, Starting Wed 2/15/2023, Normal      mometasone-formoterol (Dulera) 100-5 MCG/ACT inhaler Inhale 2 puffs 2 (two) times a day Rinse mouth after use., Starting Wed 2/15/2023, Normal      montelukast (SINGULAIR) 10 mg tablet Take 1 tablet (10 mg total) by mouth daily at bedtime, Starting Wed 2/15/2023, Normal      omeprazole (PriLOSEC) 20 mg delayed release capsule Take 1 capsule (20 mg total) by mouth daily, Starting Wed 2/15/2023, Normal      potassium chloride (Klor-Con) 10 mEq tablet Take 10 mEq by mouth 2 (two) times a day, Historical Med      vitamin B-12 (VITAMIN B-12) 1,000 mcg tablet Take by mouth daily, Historical Med             No discharge procedures on file.     PDMP Review     None          ED Provider  Electronically Signed by           Concetta Genao MD  07/14/23 4347

## 2023-08-16 ENCOUNTER — OFFICE VISIT (OUTPATIENT)
Dept: FAMILY MEDICINE CLINIC | Facility: CLINIC | Age: 48
End: 2023-08-16
Payer: COMMERCIAL

## 2023-08-16 VITALS
BODY MASS INDEX: 33.36 KG/M2 | SYSTOLIC BLOOD PRESSURE: 120 MMHG | DIASTOLIC BLOOD PRESSURE: 86 MMHG | HEART RATE: 89 BPM | TEMPERATURE: 99.3 F | WEIGHT: 225.2 LBS | OXYGEN SATURATION: 98 % | HEIGHT: 69 IN | RESPIRATION RATE: 18 BRPM

## 2023-08-16 DIAGNOSIS — R73.03 PRE-DIABETES: ICD-10-CM

## 2023-08-16 DIAGNOSIS — F10.20 ALCOHOL USE DISORDER, MODERATE, DEPENDENCE (HCC): ICD-10-CM

## 2023-08-16 DIAGNOSIS — K21.9 GASTROESOPHAGEAL REFLUX DISEASE, UNSPECIFIED WHETHER ESOPHAGITIS PRESENT: Primary | ICD-10-CM

## 2023-08-16 DIAGNOSIS — R10.30 LOWER ABDOMINAL PAIN: ICD-10-CM

## 2023-08-16 LAB — SL AMB POCT URINE HCG: NEGATIVE

## 2023-08-16 PROCEDURE — 99214 OFFICE O/P EST MOD 30 MIN: CPT | Performed by: INTERNAL MEDICINE

## 2023-08-16 PROCEDURE — 81025 URINE PREGNANCY TEST: CPT | Performed by: INTERNAL MEDICINE

## 2023-08-16 RX ORDER — OMEPRAZOLE 40 MG/1
40 CAPSULE, DELAYED RELEASE ORAL DAILY
Qty: 90 CAPSULE | Refills: 1 | Status: SHIPPED | OUTPATIENT
Start: 2023-08-16

## 2023-08-16 NOTE — PROGRESS NOTES
[facility]        NAME: Henry Bueno is a 46 y.o. female  : 1975    MRN: [id number]  DATE: 2023  TIME: 2:51 PM    Assessment and Plan   1. Gastroesophageal reflux disease, unspecified whether esophagitis present  -increase PPI, advised that she will need GI follow up for EGD if symptoms do not improve, especially given history of alcohol abuse   -    omeprazole (PriLOSEC) 40 MG capsule; Take 1 capsule (40 mg total) by mouth daily  -     CBC and differential; Future; Expected date: 2024  -     Comprehensive metabolic panel; Future; Expected date: 2024    2. Alcohol use disorder, moderate, dependence (HCC)  -     CBC and differential; Future; Expected date: 2024  -     Comprehensive metabolic panel; Future; Expected date: 2024    3. Pre-diabetes  -     CBC and differential; Future; Expected date: 2024  -     Comprehensive metabolic panel; Future; Expected date: 2024  -     HEMOGLOBIN A1C W/ EAG ESTIMATION; Future; Expected date: 2024    4. Lower abdominal pain  -UPT negative, borderline positive serum HCG in ER. Pt is post-menopausal       -POCT urine HCG             Chief Complaint     Chief Complaint   Patient presents with   • Follow-up   • Abdominal Pain     Picked up OTC medication for this that she is taking and omeprazole. Had bad heartburn the other night. History of Present Illness       52yo female with history of pre-diabetes, alcohol abuse, bipolar disorder here for 3 month follow up. In ER recently due to nausea, stomach upset. Serum HCG borderline elevated. Pt states that she is post-menopausal for several years. No breast swelling or tenderness. Reports that she cut down on alcohol intake. Still smoking, however. Review of Systems   Review of Systems   Constitutional: Positive for fatigue. Negative for appetite change, chills and fever.    Respiratory: Negative for chest tightness and shortness of breath. Cardiovascular: Negative for chest pain. Gastrointestinal: Positive for abdominal pain and nausea. Negative for constipation, diarrhea and vomiting. Musculoskeletal: Positive for back pain.          Current Medications       Current Outpatient Medications:   •  albuterol (ProAir HFA) 90 mcg/act inhaler, Inhale 2 puffs every 6 (six) hours as needed for wheezing or shortness of breath, Disp: 18 g, Rfl: 3  •  docusate sodium (COLACE) 100 mg capsule, Take 100 mg by mouth 2 (two) times a day, Disp: , Rfl:   •  dulaglutide (Trulicity) 1.5 QD/6.0NI injection, Inject 0.5 mL (1.5 mg total) under the skin every 7 days, Disp: 6 mL, Rfl: 1  •  folic acid (FOLVITE) 1 mg tablet, Take by mouth daily, Disp: , Rfl:   •  furosemide (LASIX) 40 mg tablet, TAKE 1 TABLET BY MOUTH EACH MORNING., Disp: , Rfl:   •  metoprolol tartrate (LOPRESSOR) 50 mg tablet, Take 1 tablet (50 mg total) by mouth every 12 (twelve) hours, Disp: 180 tablet, Rfl: 1  •  mometasone-formoterol (Dulera) 100-5 MCG/ACT inhaler, Inhale 2 puffs 2 (two) times a day Rinse mouth after use., Disp: 13 g, Rfl: 3  •  montelukast (SINGULAIR) 10 mg tablet, Take 1 tablet (10 mg total) by mouth daily at bedtime, Disp: 90 tablet, Rfl: 1  •  omeprazole (PriLOSEC) 40 MG capsule, Take 1 capsule (40 mg total) by mouth daily, Disp: 90 capsule, Rfl: 1  •  potassium chloride (Klor-Con) 10 mEq tablet, Take 10 mEq by mouth 2 (two) times a day, Disp: , Rfl:   •  vitamin B-12 (VITAMIN B-12) 1,000 mcg tablet, Take by mouth daily, Disp: , Rfl:     Current Allergies     Allergies as of 08/16/2023 - Reviewed 08/16/2023   Allergen Reaction Noted   • Pollen extract Eye Swelling 01/07/2023            The following portions of the patient's history were reviewed and updated as appropriate: allergies, current medications, past family history, past medical history, past social history, past surgical history and problem list.     Past Medical History:   Diagnosis Date   • Diabetes mellitus ([address])    • Hypertension    • Psychiatric disorder        No past surgical history on file. Family History   Problem Relation Age of Onset   • Diabetes Mother    • Diabetes Brother          Medications have been verified. Objective   /86   Pulse 89   Temp 99.3 °F (37.4 °C)   Resp 18   Ht 5' 9" (1.753 m)   Wt 102 kg (225 lb 3.2 oz)   SpO2 98%   BMI 33.26 kg/m²        Physical Exam     Physical Exam  Vitals reviewed. Constitutional:       General: She is not in acute distress. Appearance: She is obese. Cardiovascular:      Rate and Rhythm: Normal rate and regular rhythm. Heart sounds: No murmur heard. No friction rub. No gallop. Pulmonary:      Effort: Pulmonary effort is normal. No respiratory distress. Breath sounds: Normal breath sounds. No wheezing, rhonchi or rales. Abdominal:      General: Abdomen is flat. Bowel sounds are normal.      Palpations: Abdomen is soft. There is no fluid wave, hepatomegaly or mass. Tenderness: There is no abdominal tenderness. There is no guarding or rebound. Skin:     General: Skin is warm and dry. Neurological:      General: No focal deficit present. Mental Status: She is alert.    Psychiatric:         Mood and Affect: Mood normal.         Behavior: Behavior normal.             Results:  Lab Results   Component Value Date    SODIUM 138 07/13/2023    K 3.8 07/13/2023     07/13/2023    CO2 20 (L) 07/13/2023    BUN 18 07/13/2023    CREATININE 1.08 07/13/2023    GLUC 99 07/13/2023    CALCIUM 8.2 (L) 07/13/2023       Lab Results   Component Value Date    HGBA1C 5.5 02/15/2023       Lab Results   Component Value Date    WBC 6.09 07/13/2023    HGB 11.6 07/13/2023    HCT 36.9 07/13/2023    MCV 87 07/13/2023     07/13/2023

## 2023-08-24 DIAGNOSIS — J45.20 MILD INTERMITTENT ASTHMA WITHOUT COMPLICATION: ICD-10-CM

## 2023-08-24 RX ORDER — MONTELUKAST SODIUM 10 MG/1
10 TABLET ORAL
Qty: 90 TABLET | Refills: 1 | Status: SHIPPED | OUTPATIENT
Start: 2023-08-24

## 2023-08-24 NOTE — TELEPHONE ENCOUNTER
Patient requesting refill(s) of: Singulair 10 mg daily    Last filled: 2/15/2023 #90 x 1  Last appt: 8/16/2023  Next appt: 2/19/2024  Pharmacy: [facility]

## 2023-10-08 ENCOUNTER — HOSPITAL ENCOUNTER (EMERGENCY)
Facility: HOSPITAL | Age: 48
Discharge: HOME | End: 2023-10-08
Attending: EMERGENCY MEDICINE
Payer: COMMERCIAL

## 2023-10-08 VITALS
TEMPERATURE: 97.8 F | RESPIRATION RATE: 18 BRPM | DIASTOLIC BLOOD PRESSURE: 57 MMHG | OXYGEN SATURATION: 95 % | HEART RATE: 88 BPM | SYSTOLIC BLOOD PRESSURE: 107 MMHG

## 2023-10-08 DIAGNOSIS — F32.A DEPRESSION: Primary | ICD-10-CM

## 2023-10-08 DIAGNOSIS — F10.929 ALCOHOL INTOXICATION (HCC): ICD-10-CM

## 2023-10-08 LAB
AMPHETAMINES SERPL QL SCN: NEGATIVE
ATRIAL RATE: 72 BPM
BARBITURATES UR QL: NEGATIVE
BENZODIAZ UR QL: NEGATIVE
COCAINE UR QL: NEGATIVE
ETHANOL EXG-MCNC: 0.18 MG/DL
ETHANOL EXG-MCNC: 0.8 MG/DL
EXT PREGNANCY TEST URINE: NEGATIVE
EXT. CONTROL: NORMAL
OPIATES UR QL SCN: NEGATIVE
OXYCODONE+OXYMORPHONE UR QL SCN: NEGATIVE
P AXIS: 56 DEGREES
PCP UR QL: NEGATIVE
PR INTERVAL: 156 MS
QRS AXIS: 33 DEGREES
QRSD INTERVAL: 86 MS
QT INTERVAL: 392 MS
QTC INTERVAL: 429 MS
T WAVE AXIS: 28 DEGREES
THC UR QL: NEGATIVE
VENTRICULAR RATE: 72 BPM

## 2023-10-08 PROCEDURE — 93005 ELECTROCARDIOGRAM TRACING: CPT

## 2023-10-08 PROCEDURE — 93010 ELECTROCARDIOGRAM REPORT: CPT | Performed by: INTERNAL MEDICINE

## 2023-10-08 PROCEDURE — 80307 DRUG TEST PRSMV CHEM ANLYZR: CPT | Performed by: EMERGENCY MEDICINE

## 2023-10-08 PROCEDURE — 82075 ASSAY OF BREATH ETHANOL: CPT | Performed by: EMERGENCY MEDICINE

## 2023-10-08 PROCEDURE — 99285 EMERGENCY DEPT VISIT HI MDM: CPT | Performed by: EMERGENCY MEDICINE

## 2023-10-08 PROCEDURE — 81025 URINE PREGNANCY TEST: CPT | Performed by: EMERGENCY MEDICINE

## 2023-10-08 PROCEDURE — 99284 EMERGENCY DEPT VISIT MOD MDM: CPT

## 2023-10-08 PROCEDURE — 82075 ASSAY OF BREATH ETHANOL: CPT

## 2023-10-08 NOTE — ED PROVIDER NOTES
History  Chief Complaint   Patient presents with   • Depression     Patient states that she has had longstanding depression. She states that she is not on any medications. She states that she has not done anything to hurt herself. She has no plans to hurt herself. She does not want to hurt anyone else. She notes that she has been drinking alcohol throughout the day. She has no medical complaints. She states she otherwise feels well. She does not know if she wants inpatient or not. Prior to Admission Medications   Prescriptions Last Dose Informant Patient Reported? Taking? albuterol (ProAir HFA) 90 mcg/act inhaler   No No   Sig: Inhale 2 puffs every 6 (six) hours as needed for wheezing or shortness of breath   docusate sodium (COLACE) 100 mg capsule   Yes No   Sig: Take 100 mg by mouth 2 (two) times a day   dulaglutide (Trulicity) 1.5 QD/1.7TC injection   No No   Sig: Inject 0.5 mL (1.5 mg total) under the skin every 7 days   folic acid (FOLVITE) 1 mg tablet   Yes No   Sig: Take by mouth daily   furosemide (LASIX) 40 mg tablet   Yes No   Sig: TAKE 1 TABLET BY MOUTH EACH MORNING.   metoprolol tartrate (LOPRESSOR) 50 mg tablet   No No   Sig: Take 1 tablet (50 mg total) by mouth every 12 (twelve) hours   mometasone-formoterol (Dulera) 100-5 MCG/ACT inhaler   No No   Sig: Inhale 2 puffs 2 (two) times a day Rinse mouth after use.   montelukast (SINGULAIR) 10 mg tablet   No No   Sig: Take 1 tablet (10 mg total) by mouth daily at bedtime   omeprazole (PriLOSEC) 40 MG capsule   No No   Sig: Take 1 capsule (40 mg total) by mouth daily   potassium chloride (Klor-Con) 10 mEq tablet   Yes No   Sig: Take 10 mEq by mouth 2 (two) times a day   vitamin B-12 (VITAMIN B-12) 1,000 mcg tablet   Yes No   Sig: Take by mouth daily      Facility-Administered Medications: None       Past Medical History:   Diagnosis Date   • Diabetes mellitus ([address])    • Hypertension    • Psychiatric disorder        History reviewed.  No well developed, well nourished , in no acute distress but anxious , ambulating without difficulty , normal communication ability pertinent surgical history. Family History   Problem Relation Age of Onset   • Diabetes Mother    • Diabetes Brother      I have reviewed and agree with the history as documented. E-Cigarette/Vaping   • E-Cigarette Use Never User      E-Cigarette/Vaping Substances   • Nicotine No    • THC No    • CBD No    • Flavoring No    • Other No    • Unknown No      Social History     Tobacco Use   • Smoking status: Some Days     Packs/day: 0.50     Years: 30.00     Total pack years: 15.00     Types: Cigarettes   Vaping Use   • Vaping Use: Never used   Substance Use Topics   • Alcohol use: Yes     Alcohol/week: 40.0 standard drinks of alcohol     Types: 40 Cans of beer per week   • Drug use: Not Currently       Review of Systems   Constitutional: Negative for activity change, chills, fatigue and fever. HENT: Negative for congestion. Eyes: Negative for visual disturbance. Respiratory: Negative for cough, chest tightness and shortness of breath. Cardiovascular: Negative for chest pain. Gastrointestinal: Negative for abdominal pain, diarrhea and vomiting. Genitourinary: Negative for dysuria. Skin: Negative for rash. Neurological: Negative for dizziness, weakness and numbness. Physical Exam  Physical Exam  Constitutional:       General: She is not in acute distress. Appearance: She is well-developed. She is not ill-appearing, toxic-appearing or diaphoretic. HENT:      Head: Normocephalic and atraumatic. Right Ear: External ear normal.      Left Ear: External ear normal.      Mouth/Throat:      Comments: Edentulous  Eyes:      Conjunctiva/sclera: Conjunctivae normal.      Pupils: Pupils are equal, round, and reactive to light. Cardiovascular:      Rate and Rhythm: Normal rate and regular rhythm. Heart sounds: Normal heart sounds. Pulmonary:      Effort: Pulmonary effort is normal. No respiratory distress. Breath sounds: Normal breath sounds.    Abdominal:      General: Bowel sounds are normal.      Palpations: Abdomen is soft. Musculoskeletal:         General: Normal range of motion. Cervical back: Normal range of motion and neck supple. Skin:     General: Skin is warm and dry. Capillary Refill: Capillary refill takes less than 2 seconds. Neurological:      Mental Status: She is alert and oriented to person, place, and time. Comments: Findings consistent with alcohol intoxication   Psychiatric:      Comments: Labile, tearful, occasionally aggressive         Vital Signs  ED Triage Vitals [10/08/23 0454]   Temperature Pulse Respirations Blood Pressure SpO2   97.8 °F (36.6 °C) 77 18 144/100 98 %      Temp Source Heart Rate Source Patient Position - Orthostatic VS BP Location FiO2 (%)   Tympanic Monitor Lying Left arm --      Pain Score       --           Vitals:    10/08/23 0454   BP: 144/100   Pulse: 77   Patient Position - Orthostatic VS: Lying         Visual Acuity      ED Medications  Medications - No data to display    Diagnostic Studies  Results Reviewed     Procedure Component Value Units Date/Time    Rapid drug screen, urine [[id number]] Collected: 10/08/23 0530    Lab Status: Final result Specimen: Urine, Clean Catch Updated: 10/08/23 0551     Amph/Meth UR Negative     Barbiturate Ur Negative     Benzodiazepine Urine Negative     Cocaine Urine Negative     Methadone Urine --     Opiate Urine Negative     PCP Ur Negative     THC Urine Negative     Oxycodone Urine Negative    Narrative:      FOR MEDICAL PURPOSES ONLY. IF CONFIRMATION NEEDED PLEASE CONTACT THE LAB WITHIN 5 DAYS.     Drug Screen Cutoff Levels:  AMPHETAMINE/METHAMPHETAMINES  1000 ng/mL  BARBITURATES     200 ng/mL  BENZODIAZEPINES     200 ng/mL  COCAINE      300 ng/mL  METHADONE      300 ng/mL  OPIATES      300 ng/mL  PHENCYCLIDINE     25 ng/mL  THC       50 ng/mL  OXYCODONE      100 ng/mL    POCT pregnancy, urine [[id number]]  (Normal) Resulted: 10/08/23 0529    Lab Status: Final result Specimen: Urine Updated: 10/08/23 0530     EXT Preg Test, Ur Negative     Control Valid    POCT alcohol breath test [[id number]]  (Normal) Resulted: 10/08/23 0521    Lab Status: Final result Updated: 10/08/23 0521     EXTBreath Alcohol 0.178                 No orders to display              Procedures  Procedures         ED Course                               SBIRT 22yo+    Flowsheet Row Most Recent Value   Initial Alcohol Screen: US AUDIT-C     1. How often do you have a drink containing alcohol? 6 Filed at: 10/08/2023 0454   2. How many drinks containing alcohol do you have on a typical day you are drinking? 5 Filed at: 10/08/2023 0454   3a. Male UNDER 65: How often do you have five or more drinks on one occasion? 0 Filed at: 10/08/2023 0454   3b. FEMALE Any Age, or MALE 65+: How often do you have 4 or more drinks on one occassion? 5 Filed at: 10/08/2023 0454   Audit-C Score 16 Filed at: 10/08/2023 0454   Full Alcohol Screen: US AUDIT    4. How often during the last year have you found that you were not able to stop drinking once you had started? 0 Filed at: 10/08/2023 0454   5. How often during past year have you failed to do what was normally expected of you because of drinking? 0 Filed at: 10/08/2023 0454   6. How often in past year have you needed a first drink in the morning to get yourself going after a heavy drinking session? 0 Filed at: 10/08/2023 0454   7. How often in past year have you had feeling of guilt or remorse after drinking? 0 Filed at: 10/08/2023 0454   8. How often in past year have you been unable to remember what happened night before because you had been drinking? 0 Filed at: 10/08/2023 0454   9. Have you or someone else been injured as a result of your drinking? 0 Filed at: 10/08/2023 0454   10.  Has a relative, friend, doctor or other health worker been concerned about your drinking and suggested you cut down?  0 Filed at: 10/08/2023 0454   AUDIT Total Score 16 Filed at: 10/08/2023 3700 [facility] LUANN: How many times in the past year have you. .. Used an illegal drug or used a prescription medication for non-medical reasons? Never Filed at: 10/08/2023 [facility]                    Medical Decision Making  Is a 49-year-old female who comes in intoxicated for depression. Patient is awaiting crisis evaluation when she is sober. Unclear if she meets inpatient hospitalization criteria at this point. Will be medically cleared as soon as she is no longer intoxicated with alcohol. Stable at the time of signout to Dr. Cali Hurtado.    Depression: acute illness or injury  Amount and/or Complexity of Data Reviewed  Independent Historian: EMS  External Data Reviewed: notes. Labs: ordered. Risk  Decision regarding hospitalization. Disposition  Final diagnoses:   Depression     Time reflects when diagnosis was documented in both MDM as applicable and the Disposition within this note     Time User Action Codes Description Comment    10/8/2023  5:01 AM Amado Guzmán.[place]. A] Depression       ED Disposition     ED Disposition   Transfer to Behavioral Health Condition   --    Date/Time   Sun Oct 8, 2023  5:00 AM    Comment   Elham Edmond should be transferred out to  and has been medically cleared. Follow-up Information    None         Patient's Medications   Discharge Prescriptions    No medications on file       No discharge procedures on file.     PDMP Review     None          ED Provider  Electronically Signed by           Mata Steele MD  10/08/23 7302

## 2023-10-08 NOTE — ED NOTES
This writer discussed the patients current presentation and recommended discharge plan with .  They agree with the patient being discharged at this time with referrals and/or information about [facility] drug and alcohol. The patient was Instructed to follow up with their PCP. The patient was provided with referral information for: [facility] D&A and OP mental health providers    This writer and the patient completed a safety plan. The patient was provided with a copy of their safety plan with encouragement to utilize the plan following discharge. In addition, the patient was instructed to call [facility] crisis, other crisis services, [facility] or to go to the nearest ER immediately if their situation changes at any time. This writer discussed discharge plans with the patient , who agrees with and understands the discharge plans.          SAFETY PLAN  Warning Signs (thoughts, images, mood, behavior, situations) of a potential crisis: thoughts or plans to harm self    Coping Skills (what can I do to take my mind off the problem, or to keep myself safe): go for a walk, do puzzles, deep breathing      Outside Support (who can I reach out to for support and help): mother, STEVIE Warm line 1000 [facility] Suicide Prevention Hotline:  [address] [place] [address] Drive [address]: [address] [facility] [address]: [address] [place] [address] [address] [phone] - Crisis   [phone] - Peer Support Talk Line (1-9pm daily)  [phone] - Teen Support Talk Line (1-9pm daily)  [address] [place] [address] [phone] - [address] [facility]

## 2023-10-08 NOTE — DISCHARGE INSTRUCTIONS
This writer discussed the patients current presentation and recommended discharge plan with .  They agree with the patient being discharged at this time with referrals and/or information about [facility] drug and alcohol. The patient was Instructed to follow up with their PCP. The patient was provided with referral information for: [facility] D&A and OP mental health providers    This writer and the patient completed a safety plan. The patient was provided with a copy of their safety plan with encouragement to utilize the plan following discharge. In addition, the patient was instructed to call local [facility] crisis, other crisis services, [facility] or to go to the nearest ER immediately if their situation changes at any time. This writer discussed discharge plans with the patient , who agrees with and understands the discharge plans.          SAFETY PLAN  Warning Signs (thoughts, images, mood, behavior, situations) of a potential crisis: thoughts or plans to harm self    Coping Skills (what can I do to take my mind off the problem, or to keep myself safe): go for a walk, do puzzles, deep breathing      Outside Support (who can I reach out to for support and help): mother, STEVIE Warm line 1000 [facility] Suicide Prevention Hotline:  [address] [place] [address] Drive [address]: [address] [facility] [address]: [address] [place] [address] [address] [place] [phone] - Crisis   [phone] - Peer Support Talk Line (1-9pm daily)  [phone] - Teen Support Talk Line (1-9pm daily)  [address] [place] [address] [address] [place] [address]) [phone] - 127 [facility]

## 2023-10-08 NOTE — ED NOTES
Pt removed IV and leads and reports that she is going to leave. Pt told that she is currently unable to leave as she is intoxicated. MD notified. No further orders at this time.       Edelmiro Frankel, RN  10/08/23 2851

## 2023-10-08 NOTE — ED NOTES
[facility] met with patient and completed assessment and safety risk. Patient reports that she called the ambulance because she felt her bloodpressure was elevated. Patient denies suicidal and homicidal ideations. Patient reports daily drinking and stated she was suppose to go to rehab but they didnt come and get her, will provide resource for [facility] D&A. Patient reports being sad and depressed due to not seeing her daughter who lives close to [place]. States she does not have a current psychiatrist therefore outpatient resources will also be provided. Patient denies auditory and visual hallucinations. 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 Patient was educated if symptoms continue or worsen to call 911 or go to the nearest ER and is in agreement to do as such.

## 2023-10-08 NOTE — ED NOTES
Security called to the room and discussed with patient that she can leave if she has someone she knows to come pick her up and stay with her. Situation currently deescalated.       Nuria Chambers RN  10/08/23 8940

## 2023-11-06 ENCOUNTER — TELEPHONE (OUTPATIENT)
Dept: FAMILY MEDICINE CLINIC | Facility: CLINIC | Age: 48
End: 2023-11-06

## 2023-11-06 NOTE — TELEPHONE ENCOUNTER
Patient called the office. Symptoms are runny nose, cough, sore throat. Denies any fevers, nausea, body aches. Started 6 days ago. Has been taking NyQuil/DayQuil and using cough drops with little relief. Did not do any at home covid testing. Uses [facility] in [place].

## 2023-11-08 DIAGNOSIS — J06.9 UPPER RESPIRATORY TRACT INFECTION, UNSPECIFIED TYPE: Primary | ICD-10-CM

## 2023-11-08 RX ORDER — AZITHROMYCIN 250 MG/1
TABLET, FILM COATED ORAL
Qty: 6 TABLET | Refills: 0 | Status: SHIPPED | OUTPATIENT
Start: 2023-11-08 | End: 2023-11-12

## 2023-11-09 ENCOUNTER — TELEPHONE (OUTPATIENT)
Dept: FAMILY MEDICINE CLINIC | Facility: CLINIC | Age: 48
End: 2023-11-09

## 2023-11-09 DIAGNOSIS — J06.9 UPPER RESPIRATORY TRACT INFECTION, UNSPECIFIED TYPE: Primary | ICD-10-CM

## 2023-11-09 NOTE — TELEPHONE ENCOUNTER
Patient called office. States she started the zpak last night and is not seeing any improvement. Taking Nyquil OTC as well. Pt still c/o cough, congestion, runny nose. I did advise her that it may take a few days for the zpak to take effect, but she would still like PCP to call something else in. Please advise.

## 2023-11-09 NOTE — TELEPHONE ENCOUNTER
Informed patient of this, she verbalized understanding. Will come in tomorrow for covid/flu testing. Ordered.

## 2023-11-14 ENCOUNTER — APPOINTMENT (EMERGENCY)
Dept: RADIOLOGY | Facility: HOSPITAL | Age: 48
End: 2023-11-14
Payer: COMMERCIAL

## 2023-11-14 ENCOUNTER — HOSPITAL ENCOUNTER (EMERGENCY)
Facility: HOSPITAL | Age: 48
Discharge: HOME | End: 2023-11-14
Attending: EMERGENCY MEDICINE
Payer: COMMERCIAL

## 2023-11-14 VITALS
HEIGHT: 69 IN | HEART RATE: 103 BPM | OXYGEN SATURATION: 97 % | RESPIRATION RATE: 20 BRPM | BODY MASS INDEX: 33.33 KG/M2 | WEIGHT: 225 LBS | TEMPERATURE: 97.5 F | SYSTOLIC BLOOD PRESSURE: 167 MMHG | DIASTOLIC BLOOD PRESSURE: 106 MMHG

## 2023-11-14 DIAGNOSIS — F10.929 ALCOHOL INTOXICATION (HCC): Primary | ICD-10-CM

## 2023-11-14 DIAGNOSIS — F10.920 ALCOHOLIC INTOXICATION WITHOUT COMPLICATION (HCC): ICD-10-CM

## 2023-11-14 LAB
ALBUMIN SERPL BCP-MCNC: 3.4 G/DL (ref 3.5–5)
ALP SERPL-CCNC: 116 U/L (ref 34–104)
ALT SERPL W P-5'-P-CCNC: 12 U/L (ref 7–52)
AMPHETAMINES SERPL QL SCN: NEGATIVE
ANION GAP SERPL CALCULATED.3IONS-SCNC: 11 MMOL/L
APTT PPP: 27 SECONDS (ref 23–37)
AST SERPL W P-5'-P-CCNC: 30 U/L (ref 13–39)
ATRIAL RATE: 94 BPM
BARBITURATES UR QL: NEGATIVE
BASOPHILS # BLD AUTO: 0.11 THOUSANDS/ÂΜL (ref 0–0.1)
BASOPHILS NFR BLD AUTO: 2 % (ref 0–1)
BENZODIAZ UR QL: NEGATIVE
BILIRUB SERPL-MCNC: 0.29 MG/DL (ref 0.2–1)
BUN SERPL-MCNC: 10 MG/DL (ref 5–25)
CALCIUM ALBUM COR SERPL-MCNC: 8.7 MG/DL (ref 8.3–10.1)
CALCIUM SERPL-MCNC: 8.2 MG/DL (ref 8.4–10.2)
CHLORIDE SERPL-SCNC: 106 MMOL/L (ref 96–108)
CO2 SERPL-SCNC: 21 MMOL/L (ref 21–32)
COCAINE UR QL: NEGATIVE
CREAT SERPL-MCNC: 0.87 MG/DL (ref 0.6–1.3)
EOSINOPHIL # BLD AUTO: 0.19 THOUSAND/ÂΜL (ref 0–0.61)
EOSINOPHIL NFR BLD AUTO: 3 % (ref 0–6)
ERYTHROCYTE [DISTWIDTH] IN BLOOD BY AUTOMATED COUNT: 17.2 % (ref 11.6–15.1)
ETHANOL EXG-MCNC: 0.09 MG/DL
ETHANOL SERPL-MCNC: 270 MG/DL
EXT PREGNANCY TEST URINE: NEGATIVE
EXT. CONTROL: NORMAL
GFR SERPL CREATININE-BSD FRML MDRD: 77 ML/MIN/1.73SQ M
GLUCOSE SERPL-MCNC: 85 MG/DL (ref 65–140)
HCT VFR BLD AUTO: 36.5 % (ref 34.8–46.1)
HGB BLD-MCNC: 11.2 G/DL (ref 11.5–15.4)
IMM GRANULOCYTES # BLD AUTO: 0.02 THOUSAND/UL (ref 0–0.2)
IMM GRANULOCYTES NFR BLD AUTO: 0 % (ref 0–2)
INR PPP: 1.06 (ref 0.84–1.19)
LYMPHOCYTES # BLD AUTO: 2.02 THOUSANDS/ÂΜL (ref 0.6–4.47)
LYMPHOCYTES NFR BLD AUTO: 34 % (ref 14–44)
MCH RBC QN AUTO: 28.1 PG (ref 26.8–34.3)
MCHC RBC AUTO-ENTMCNC: 30.7 G/DL (ref 31.4–37.4)
MCV RBC AUTO: 92 FL (ref 82–98)
MONOCYTES # BLD AUTO: 0.42 THOUSAND/ÂΜL (ref 0.17–1.22)
MONOCYTES NFR BLD AUTO: 7 % (ref 4–12)
NEUTROPHILS # BLD AUTO: 3.2 THOUSANDS/ÂΜL (ref 1.85–7.62)
NEUTS SEG NFR BLD AUTO: 54 % (ref 43–75)
NRBC BLD AUTO-RTO: 0 /100 WBCS
OPIATES UR QL SCN: NEGATIVE
OXYCODONE+OXYMORPHONE UR QL SCN: NEGATIVE
P AXIS: 56 DEGREES
PCP UR QL: NEGATIVE
PLATELET # BLD AUTO: 271 THOUSANDS/UL (ref 149–390)
PMV BLD AUTO: 9.8 FL (ref 8.9–12.7)
POTASSIUM SERPL-SCNC: 4.1 MMOL/L (ref 3.5–5.3)
PR INTERVAL: 162 MS
PROT SERPL-MCNC: 6.5 G/DL (ref 6.4–8.4)
PROTHROMBIN TIME: 13.9 SECONDS (ref 11.6–14.5)
QRS AXIS: 45 DEGREES
QRSD INTERVAL: 84 MS
QT INTERVAL: 356 MS
QTC INTERVAL: 445 MS
RBC # BLD AUTO: 3.98 MILLION/UL (ref 3.81–5.12)
SODIUM SERPL-SCNC: 138 MMOL/L (ref 135–147)
T WAVE AXIS: 46 DEGREES
THC UR QL: NEGATIVE
VENTRICULAR RATE: 94 BPM
WBC # BLD AUTO: 5.96 THOUSAND/UL (ref 4.31–10.16)

## 2023-11-14 PROCEDURE — 80307 DRUG TEST PRSMV CHEM ANLYZR: CPT | Performed by: EMERGENCY MEDICINE

## 2023-11-14 PROCEDURE — 82075 ASSAY OF BREATH ETHANOL: CPT | Performed by: EMERGENCY MEDICINE

## 2023-11-14 PROCEDURE — 99284 EMERGENCY DEPT VISIT MOD MDM: CPT | Performed by: EMERGENCY MEDICINE

## 2023-11-14 PROCEDURE — 85025 COMPLETE CBC W/AUTO DIFF WBC: CPT | Performed by: EMERGENCY MEDICINE

## 2023-11-14 PROCEDURE — 36415 COLL VENOUS BLD VENIPUNCTURE: CPT | Performed by: EMERGENCY MEDICINE

## 2023-11-14 PROCEDURE — 93010 ELECTROCARDIOGRAM REPORT: CPT | Performed by: INTERNAL MEDICINE

## 2023-11-14 PROCEDURE — 85610 PROTHROMBIN TIME: CPT | Performed by: EMERGENCY MEDICINE

## 2023-11-14 PROCEDURE — 71045 X-RAY EXAM CHEST 1 VIEW: CPT

## 2023-11-14 PROCEDURE — 85730 THROMBOPLASTIN TIME PARTIAL: CPT | Performed by: EMERGENCY MEDICINE

## 2023-11-14 PROCEDURE — 81025 URINE PREGNANCY TEST: CPT | Performed by: EMERGENCY MEDICINE

## 2023-11-14 PROCEDURE — 99284 EMERGENCY DEPT VISIT MOD MDM: CPT

## 2023-11-14 PROCEDURE — 80053 COMPREHEN METABOLIC PANEL: CPT | Performed by: EMERGENCY MEDICINE

## 2023-11-14 PROCEDURE — 82077 ASSAY SPEC XCP UR&BREATH IA: CPT | Performed by: EMERGENCY MEDICINE

## 2023-11-14 PROCEDURE — 93005 ELECTROCARDIOGRAM TRACING: CPT

## 2023-11-14 NOTE — ED NOTES
Transportation arranged in round trip     [facility], [address] [place] [address]  11/14/23 5682

## 2023-11-14 NOTE — DISCHARGE INSTRUCTIONS
Please consider getting help with your drinking. Did offer this in the emergency department and have refused further detox or rehab services. Return to the ER for any new, concerning, or worsening issues.

## 2023-11-14 NOTE — ED PROVIDER NOTES
History  Chief Complaint   Patient presents with    Alcohol Intoxication     Wants rehab     53-year-old female [facility] emergency department complaining of difficulty breathing, however in route, patient has no complaint. Patient appears to be intoxicated and has a history of chronic alcoholism. EMS was trying to convince the patient to get assistance, however she is not interested in rehab at the time of my evaluation. Patient states that she has a history of asthma and felt short of breath but it spontaneously resolved when getting in the ambulance. Patient also admits that she had a couple drinks today. Patient denies suicidal ideation. Prior to Admission Medications   Prescriptions Last Dose Informant Patient Reported? Taking?    albuterol (ProAir HFA) 90 mcg/act inhaler   No No   Sig: Inhale 2 puffs every 6 (six) hours as needed for wheezing or shortness of breath   docusate sodium (COLACE) 100 mg capsule   Yes No   Sig: Take 100 mg by mouth 2 (two) times a day   dulaglutide (Trulicity) 1.5 KS/0.3EK injection   No No   Sig: Inject 0.5 mL (1.5 mg total) under the skin every 7 days   folic acid (FOLVITE) 1 mg tablet   Yes No   Sig: Take by mouth daily   furosemide (LASIX) 40 mg tablet   Yes No   Sig: TAKE 1 TABLET BY MOUTH EACH MORNING.   metoprolol tartrate (LOPRESSOR) 50 mg tablet   No No   Sig: Take 1 tablet (50 mg total) by mouth every 12 (twelve) hours   mometasone-formoterol (Dulera) 100-5 MCG/ACT inhaler   No No   Sig: Inhale 2 puffs 2 (two) times a day Rinse mouth after use.   montelukast (SINGULAIR) 10 mg tablet   No No   Sig: Take 1 tablet (10 mg total) by mouth daily at bedtime   omeprazole (PriLOSEC) 40 MG capsule   No No   Sig: Take 1 capsule (40 mg total) by mouth daily   potassium chloride (Klor-Con) 10 mEq tablet   Yes No   Sig: Take 10 mEq by mouth 2 (two) times a day   vitamin B-12 (VITAMIN B-12) 1,000 mcg tablet   Yes No   Sig: Take by mouth daily      Facility-Administered Medications: None       Past Medical History:   Diagnosis Date    Alcohol abuse     Bipolar disorder ([address] )     Diabetes mellitus ([address] [facility])     Hypertension     Psychiatric disorder        History reviewed. No pertinent surgical history. Family History   Problem Relation Age of Onset    Diabetes Mother     Diabetes Brother      I have reviewed and agree with the history as documented. E-Cigarette/Vaping    E-Cigarette Use Never User      E-Cigarette/Vaping Substances    Nicotine No     THC No     CBD No     Flavoring No     Other No     Unknown No      Social History     Tobacco Use    Smoking status: Some Days     Packs/day: 0.50     Years: 30.00     Total pack years: 15.00     Types: Cigarettes   Vaping Use    Vaping Use: Never used   Substance Use Topics    Alcohol use: Yes     Alcohol/week: 40.0 standard drinks of alcohol     Types: 40 Cans of beer per week     Comment: 8-12 beer per day    Drug use: Not Currently       Review of Systems   Constitutional:  Negative for chills and fever. HENT:  Negative for ear pain and sore throat. Eyes:  Negative for pain and visual disturbance. Respiratory:  Positive for shortness of breath. Negative for cough. Cardiovascular:  Negative for chest pain and palpitations. Gastrointestinal:  Negative for abdominal pain and vomiting. Genitourinary:  Negative for dysuria and hematuria. Musculoskeletal:  Negative for arthralgias and back pain. Skin:  Negative for color change and rash. Neurological:  Negative for seizures and syncope. Psychiatric/Behavioral:  Positive for dysphoric mood. The patient is nervous/anxious. All other systems reviewed and are negative. Physical Exam  Physical Exam  Vitals and nursing note reviewed. Constitutional:       General: She is not in acute distress. Appearance: Normal appearance. She is well-developed. Comments: Appears intoxicated. HENT:      Head: Normocephalic and atraumatic.       Right Ear: External ear normal.      Left Ear: External ear normal.      Nose: Nose normal.      Mouth/Throat:      Mouth: Mucous membranes are moist.   Eyes:      Conjunctiva/sclera: Conjunctivae normal.   Cardiovascular:      Rate and Rhythm: Normal rate and regular rhythm. Pulses: Normal pulses. Heart sounds: Normal heart sounds. No murmur heard. Pulmonary:      Effort: Pulmonary effort is normal. No respiratory distress. Breath sounds: Normal breath sounds. Abdominal:      General: Bowel sounds are normal.      Palpations: Abdomen is soft. Tenderness: There is no abdominal tenderness. Musculoskeletal:         General: No swelling or deformity. Cervical back: Neck supple. Skin:     General: Skin is warm and dry. Capillary Refill: Capillary refill takes less than 2 seconds. Neurological:      General: No focal deficit present. Mental Status: She is alert and oriented to person, place, and time. Mental status is at baseline.    Psychiatric:         Mood and Affect: Mood normal.         Vital Signs  ED Triage Vitals [11/14/23 0918]   Temperature Pulse Respirations Blood Pressure SpO2   97.5 °F (36.4 °C) 104 20 140/89 97 %      Temp Source Heart Rate Source Patient Position - Orthostatic VS BP Location FiO2 (%)   Temporal Monitor Sitting Left arm --      Pain Score       No Pain           Vitals:    11/14/23 0918 11/14/23 1000 11/14/23 1130 11/14/23 1300   BP: 140/89 140/77 159/94 (!) 167/106   Pulse: 104 97 77 103   Patient Position - Orthostatic VS: Sitting  Sitting Sitting         Visual Acuity      ED Medications  Medications - No data to display    Diagnostic Studies  Results Reviewed       Procedure Component Value Units Date/Time    POCT alcohol breath test [[id number]]  (Normal) Resulted: 11/14/23 1534    Lab Status: Final result Updated: 11/14/23 1534     EXTBreath Alcohol 0.09    Rapid drug screen, urine [[id number]] Collected: 11/14/23 0945    Lab Status: Final result Specimen: Urine, Clean Catch Updated: 11/14/23 1107     Amph/Meth UR Negative     Barbiturate Ur Negative     Benzodiazepine Urine Negative     Cocaine Urine Negative     Methadone Urine --     Opiate Urine Negative     PCP Ur Negative     THC Urine Negative     Oxycodone Urine Negative    Narrative:      FOR MEDICAL PURPOSES ONLY. IF CONFIRMATION NEEDED PLEASE CONTACT THE LAB WITHIN 5 DAYS.     Drug Screen Cutoff Levels:  AMPHETAMINE/METHAMPHETAMINES  1000 ng/mL  BARBITURATES     200 ng/mL  BENZODIAZEPINES     200 ng/mL  COCAINE      300 ng/mL  METHADONE      300 ng/mL  OPIATES      300 ng/mL  PHENCYCLIDINE     25 ng/mL  THC       50 ng/mL  OXYCODONE      100 ng/mL    Protime-INR [[id number]]  (Normal) Collected: 11/14/23 0928    Lab Status: Final result Specimen: Blood from Arm, Right Updated: 11/14/23 1013     Protime 13.9 seconds      INR 1.06    APTT [[id number]]  (Normal) Collected: 11/14/23 0928    Lab Status: Final result Specimen: Blood from Arm, Right Updated: 11/14/23 1013     PTT 27 seconds     Ethanol [[id number]]  (Abnormal) Collected: 11/14/23 0928    Lab Status: Final result Specimen: Blood from Arm, Right Updated: 11/14/23 0955     Ethanol Lvl 270 mg/dL     CBC and differential [[id number]]  (Abnormal) Collected: 11/14/23 0928    Lab Status: Final result Specimen: Blood from Arm, Right Updated: 11/14/23 0954     WBC 5.96 Thousand/uL      RBC 3.98 Million/uL      Hemoglobin 11.2 g/dL      Hematocrit 36.5 %      MCV 92 fL      MCH 28.1 pg      MCHC 30.7 g/dL      RDW 17.2 %      MPV 9.8 fL      Platelets 468 Thousands/uL      nRBC 0 /100 WBCs      Neutrophils Relative 54 %      Immat GRANS % 0 %      Lymphocytes Relative 34 %      Monocytes Relative 7 %      Eosinophils Relative 3 %      Basophils Relative 2 %      Neutrophils Absolute 3.20 Thousands/µL      Immature Grans Absolute 0.02 Thousand/uL      Lymphocytes Absolute 2.02 Thousands/µL      Monocytes Absolute 0.42 Thousand/µL      Eosinophils Absolute 0.19 Thousand/µL      Basophils Absolute 0.11 Thousands/µL     Comprehensive metabolic panel [[id number]]  (Abnormal) Collected: 11/14/23 0928    Lab Status: Final result Specimen: Blood from Arm, Right Updated: 11/14/23 0953     Sodium 138 mmol/L      Potassium 4.1 mmol/L      Chloride 106 mmol/L      CO2 21 mmol/L      ANION GAP 11 mmol/L      BUN 10 mg/dL      Creatinine 0.87 mg/dL      Glucose 85 mg/dL      Calcium 8.2 mg/dL      Corrected Calcium 8.7 mg/dL      AST 30 U/L      ALT 12 U/L      Alkaline Phosphatase 116 U/L      Total Protein 6.5 g/dL      Albumin 3.4 g/dL      Total Bilirubin 0.29 mg/dL      eGFR 77 ml/min/1.73sq m     Narrative:      [place] guidelines for Chronic Kidney Disease (CKD):     Stage 1 with normal or high GFR (GFR > 90 mL/min/1.73 square meters)    Stage 2 Mild CKD (GFR = 60-89 mL/min/1.73 square meters)    Stage 3A Moderate CKD (GFR = 45-59 mL/min/1.73 square meters)    Stage 3B Moderate CKD (GFR = 30-44 mL/min/1.73 square meters)    Stage 4 Severe CKD (GFR = 15-29 mL/min/1.73 square meters)    Stage 5 End Stage CKD (GFR <15 mL/min/1.73 square meters)  Note: GFR calculation is accurate only with a steady state creatinine    POCT pregnancy, urine [[id number]]  (Normal) Resulted: 11/14/23 0945    Lab Status: Final result Updated: 11/14/23 0946     EXT Preg Test, Ur Negative     Control Valid                   XR chest 1 view portable   Final Result by Manish Koroma MD (11/14 1200)      No acute cardiopulmonary disease. Workstation performed: [id number]                    Procedures  Procedures         ED Course  ED Course as of 11/15/23 0725   Tue Nov 14, 2023   0957 MEDICAL ALCOHOL(!): 270   0957 Presumed sobriety at [address] Repeat EtOH is 0.9 by breath alcohol. Patient is alert and oriented and appropriate. Patient is ambulating at bedside. She says she is a little unsteady due to a chronic accident however this is not new for her. Patient will need a ride home as she notes no one in her home can pick her up. 1536 Patient has refused help in the form of rehab/detox. SBIRT 22yo+      Flowsheet Row Most Recent Value   Initial Alcohol Screen: US AUDIT-C     1. How often do you have a drink containing alcohol? 6 Filed at: 11/14/2023 0943   2. How many drinks containing alcohol do you have on a typical day you are drinking? 5 Filed at: 11/14/2023 0943   3a. Male UNDER 65: How often do you have five or more drinks on one occasion? 0 Filed at: 11/14/2023 0943   3b. FEMALE Any Age, or MALE 65+: How often do you have 4 or more drinks on one occassion? 6 Filed at: 11/14/2023 0943   Audit-C Score 17 Filed at: 11/14/2023 5960   Full Alcohol Screen: US AUDIT    4. How often during the last year have you found that you were not able to stop drinking once you had started? 2 Filed at: 11/14/2023 0943   5. How often during past year have you failed to do what was normally expected of you because of drinking? 0 Filed at: 11/14/2023 0943   6. How often in past year have you needed a first drink in the morning to get yourself going after a heavy drinking session? 0 Filed at: 11/14/2023 0943   7. How often in past year have you had feeling of guilt or remorse after drinking? 0 Filed at: 11/14/2023 0943   8. How often in past year have you been unable to remember what happened night before because you had been drinking? 0 Filed at: 11/14/2023 0943   9. Have you or someone else been injured as a result of your drinking? 0 Filed at: 11/14/2023 0943   10. Has a relative, friend, doctor or other health worker been concerned about your drinking and suggested you cut down?  0 Filed at: 11/14/2023 0943   AUDIT Total Score 19 Filed at: 11/14/2023 5789   LUANN: How many times in the past year have you. .. Used an illegal drug or used a prescription medication for non-medical reasons?  Never Filed at: 11/14/2023 8158 Medical Decision Making  58-year-old female presents emergency room complaining of shortness of breath that has resolved when she left her home to walk to the ambulance. The patient was noting that she was short of breath but when she got to the fresh air markedly improved, and notes she has a history of asthma. Patient on my examination has no evidence of wheezing and O2 sats are 97 to 98% on room air. Patient is intoxicated. Patient had lab work done which showed no evidence of significant leukocytosis or electrolyte abnormality. The patient had a BAL which was 270. The patient was kept in the ER for multiple hours, and needed to be held until sobriety due to the fact that the patient has no one who can pick her up, although she lives with family. The patient was reevaluated by myself at 3:40 PM, was ambulated without much difficulty. The patient is acting appropriate and answering questions without slurred speech or signs of acute withdrawal.  The patient will be discharged home with urged to get help. Repeat breath alcohol upon discharge is 0.9. Amount and/or Complexity of Data Reviewed  Labs: ordered. Decision-making details documented in ED Course. Radiology: ordered. Disposition  Final diagnoses:   Alcohol intoxication ([address])   Alcoholic intoxication without complication ([address])     Time reflects when diagnosis was documented in both MDM as applicable and the Disposition within this note       Time User Action Codes Description Comment    11/14/2023  3:36 PM Isauro Gomez Add [F10.929] Alcohol intoxication ([address])     11/14/2023  3:36 PM Xavi Childers Add [R41.037] Alcoholic intoxication without complication [facility])           ED Disposition       ED Disposition   Discharge    Condition   Stable    Date/Time   Tue Nov 14, 2023 [address] [place] [address] discharge to home/self care.                    Follow-up Information    None         Discharge Medication List as of 11/14/2023  3:38 PM        CONTINUE these medications which have NOT CHANGED    Details   montelukast (SINGULAIR) 10 mg tablet Take 1 tablet (10 mg total) by mouth daily at bedtime, Starting Thu 8/24/2023, Normal      albuterol (ProAir HFA) 90 mcg/act inhaler Inhale 2 puffs every 6 (six) hours as needed for wheezing or shortness of breath, Starting Wed 2/15/2023, Normal      docusate sodium (COLACE) 100 mg capsule Take 100 mg by mouth 2 (two) times a day, Historical Med      dulaglutide (Trulicity) 1.5 OW/5.3LD injection Inject 0.5 mL (1.5 mg total) under the skin every 7 days, Starting Mon 2/87/9657, Normal      folic acid (FOLVITE) 1 mg tablet Take by mouth daily, Historical Med      furosemide (LASIX) 40 mg tablet TAKE 1 TABLET BY MOUTH EACH MORNING., Historical Med      metoprolol tartrate (LOPRESSOR) 50 mg tablet Take 1 tablet (50 mg total) by mouth every 12 (twelve) hours, Starting Wed 2/15/2023, Normal      mometasone-formoterol (Dulera) 100-5 MCG/ACT inhaler Inhale 2 puffs 2 (two) times a day Rinse mouth after use., Starting Wed 2/15/2023, Normal      omeprazole (PriLOSEC) 40 MG capsule Take 1 capsule (40 mg total) by mouth daily, Starting Wed 8/16/2023, Normal      potassium chloride (Klor-Con) 10 mEq tablet Take 10 mEq by mouth 2 (two) times a day, Historical Med      vitamin B-12 (VITAMIN B-12) 1,000 mcg tablet Take by mouth daily, Historical Med             No discharge procedures on file.     PDMP Review       None            ED Provider  Electronically Signed by             Miriam Meeks., DO  11/15/23 7242

## 2023-12-07 DIAGNOSIS — R73.03 PRE-DIABETES: Primary | ICD-10-CM

## 2023-12-07 RX ORDER — BLOOD SUGAR DIAGNOSTIC
STRIP MISCELLANEOUS
Qty: 100 EACH | Refills: 1 | Status: SHIPPED | OUTPATIENT
Start: 2023-12-07

## 2023-12-07 NOTE — TELEPHONE ENCOUNTER
Patient recently obtained a one touch ultra 2 glucose monitor. Asking for test strips to be called in to [facility].      Pended for approval

## 2023-12-08 ENCOUNTER — HOSPITAL ENCOUNTER (EMERGENCY)
Facility: HOSPITAL | Age: 48
Discharge: HOME | End: 2023-12-08
Attending: EMERGENCY MEDICINE
Payer: COMMERCIAL

## 2023-12-08 ENCOUNTER — APPOINTMENT (EMERGENCY)
Dept: RADIOLOGY | Facility: HOSPITAL | Age: 48
End: 2023-12-08
Payer: COMMERCIAL

## 2023-12-08 ENCOUNTER — APPOINTMENT (EMERGENCY)
Dept: CT IMAGING | Facility: HOSPITAL | Age: 48
End: 2023-12-08
Payer: COMMERCIAL

## 2023-12-08 VITALS
HEART RATE: 91 BPM | SYSTOLIC BLOOD PRESSURE: 150 MMHG | OXYGEN SATURATION: 95 % | DIASTOLIC BLOOD PRESSURE: 95 MMHG | TEMPERATURE: 97.6 F | RESPIRATION RATE: 22 BRPM

## 2023-12-08 DIAGNOSIS — F10.929 ALCOHOL INTOXICATION (HCC): Primary | ICD-10-CM

## 2023-12-08 LAB
ALBUMIN SERPL BCP-MCNC: 3.8 G/DL (ref 3.5–5)
ALP SERPL-CCNC: 114 U/L (ref 34–104)
ALT SERPL W P-5'-P-CCNC: 14 U/L (ref 7–52)
ANION GAP SERPL CALCULATED.3IONS-SCNC: 11 MMOL/L
AST SERPL W P-5'-P-CCNC: 23 U/L (ref 13–39)
BASOPHILS # BLD AUTO: 0.17 THOUSANDS/ÂΜL (ref 0–0.1)
BASOPHILS NFR BLD AUTO: 2 % (ref 0–1)
BILIRUB SERPL-MCNC: 0.26 MG/DL (ref 0.2–1)
BUN SERPL-MCNC: 9 MG/DL (ref 5–25)
CALCIUM SERPL-MCNC: 8.4 MG/DL (ref 8.4–10.2)
CHLORIDE SERPL-SCNC: 100 MMOL/L (ref 96–108)
CO2 SERPL-SCNC: 20 MMOL/L (ref 21–32)
CREAT SERPL-MCNC: 0.87 MG/DL (ref 0.6–1.3)
EOSINOPHIL # BLD AUTO: 0.31 THOUSAND/ÂΜL (ref 0–0.61)
EOSINOPHIL NFR BLD AUTO: 4 % (ref 0–6)
ERYTHROCYTE [DISTWIDTH] IN BLOOD BY AUTOMATED COUNT: 18 % (ref 11.6–15.1)
ETHANOL SERPL-MCNC: 277 MG/DL
GFR SERPL CREATININE-BSD FRML MDRD: 77 ML/MIN/1.73SQ M
GLUCOSE SERPL-MCNC: 93 MG/DL (ref 65–140)
HCT VFR BLD AUTO: 35.9 % (ref 34.8–46.1)
HGB BLD-MCNC: 11.4 G/DL (ref 11.5–15.4)
IMM GRANULOCYTES # BLD AUTO: 0.01 THOUSAND/UL (ref 0–0.2)
IMM GRANULOCYTES NFR BLD AUTO: 0 % (ref 0–2)
LYMPHOCYTES # BLD AUTO: 4.49 THOUSANDS/ÂΜL (ref 0.6–4.47)
LYMPHOCYTES NFR BLD AUTO: 52 % (ref 14–44)
MCH RBC QN AUTO: 29.2 PG (ref 26.8–34.3)
MCHC RBC AUTO-ENTMCNC: 31.8 G/DL (ref 31.4–37.4)
MCV RBC AUTO: 92 FL (ref 82–98)
MONOCYTES # BLD AUTO: 0.64 THOUSAND/ÂΜL (ref 0.17–1.22)
MONOCYTES NFR BLD AUTO: 7 % (ref 4–12)
NEUTROPHILS # BLD AUTO: 3.08 THOUSANDS/ÂΜL (ref 1.85–7.62)
NEUTS SEG NFR BLD AUTO: 35 % (ref 43–75)
NRBC BLD AUTO-RTO: 0 /100 WBCS
PLATELET # BLD AUTO: 354 THOUSANDS/UL (ref 149–390)
PMV BLD AUTO: 10.1 FL (ref 8.9–12.7)
POTASSIUM SERPL-SCNC: 4 MMOL/L (ref 3.5–5.3)
PROT SERPL-MCNC: 6.6 G/DL (ref 6.4–8.4)
RBC # BLD AUTO: 3.9 MILLION/UL (ref 3.81–5.12)
SODIUM SERPL-SCNC: 131 MMOL/L (ref 135–147)
WBC # BLD AUTO: 8.7 THOUSAND/UL (ref 4.31–10.16)

## 2023-12-08 PROCEDURE — G1004 CDSM NDSC: HCPCS

## 2023-12-08 PROCEDURE — 71045 X-RAY EXAM CHEST 1 VIEW: CPT

## 2023-12-08 PROCEDURE — 36415 COLL VENOUS BLD VENIPUNCTURE: CPT | Performed by: EMERGENCY MEDICINE

## 2023-12-08 PROCEDURE — 80053 COMPREHEN METABOLIC PANEL: CPT | Performed by: EMERGENCY MEDICINE

## 2023-12-08 PROCEDURE — 82077 ASSAY SPEC XCP UR&BREATH IA: CPT | Performed by: EMERGENCY MEDICINE

## 2023-12-08 PROCEDURE — 70450 CT HEAD/BRAIN W/O DYE: CPT

## 2023-12-08 PROCEDURE — 99284 EMERGENCY DEPT VISIT MOD MDM: CPT

## 2023-12-08 PROCEDURE — 72125 CT NECK SPINE W/O DYE: CPT

## 2023-12-08 PROCEDURE — 85025 COMPLETE CBC W/AUTO DIFF WBC: CPT | Performed by: EMERGENCY MEDICINE

## 2023-12-08 PROCEDURE — 99284 EMERGENCY DEPT VISIT MOD MDM: CPT | Performed by: EMERGENCY MEDICINE

## 2023-12-08 NOTE — ED PROVIDER NOTES
History  Chief Complaint   Patient presents with    Alcohol Intoxication     Arrival via EMS. Pt fell out of bed, called for EMS, tried to refuse care. Intoxicated. Patient is a 42-year-old female with a history of psychiatric disease and alcohol abuse who presents for evaluation of alcohol intoxication. Apparently she slid out of bed and was complaining of some back pain. EMS was called and she was attempting to refuse but patient was too intoxicated to refuse. Patient has no complaints at this time. She denies headache nausea vomiting chest pain dyspnea abdominal pain. No known blood thinners. She denies urinary or bowel symptoms at this time. Prior to Admission Medications   Prescriptions Last Dose Informant Patient Reported? Taking? albuterol (ProAir HFA) 90 mcg/act inhaler   No No   Sig: Inhale 2 puffs every 6 (six) hours as needed for wheezing or shortness of breath   docusate sodium (COLACE) 100 mg capsule   Yes No   Sig: Take 100 mg by mouth 2 (two) times a day   dulaglutide (Trulicity) 1.5 FE/5.1LL injection   No No   Sig: Inject 0.5 mL (1.5 mg total) under the skin every 7 days   folic acid (FOLVITE) 1 mg tablet   Yes No   Sig: Take by mouth daily   furosemide (LASIX) 40 mg tablet   Yes No   Sig: TAKE 1 TABLET BY MOUTH EACH MORNING.    glucose blood (OneTouch Ultra) test strip   No No   Sig: Use as instructed   metoprolol tartrate (LOPRESSOR) 50 mg tablet   No No   Sig: Take 1 tablet (50 mg total) by mouth every 12 (twelve) hours   mometasone-formoterol (Dulera) 100-5 MCG/ACT inhaler   No No   Sig: Inhale 2 puffs 2 (two) times a day Rinse mouth after use.   montelukast (SINGULAIR) 10 mg tablet   No No   Sig: Take 1 tablet (10 mg total) by mouth daily at bedtime   omeprazole (PriLOSEC) 40 MG capsule   No No   Sig: Take 1 capsule (40 mg total) by mouth daily   potassium chloride (Klor-Con) 10 mEq tablet   Yes No   Sig: Take 10 mEq by mouth 2 (two) times a day   vitamin B-12 (VITAMIN B-12) 1,000 mcg tablet   Yes No   Sig: Take by mouth daily      Facility-Administered Medications: None       Past Medical History:   Diagnosis Date    Alcohol abuse     Bipolar disorder ([address] [facility])     Diabetes mellitus ([address] [facility])     Hypertension     Psychiatric disorder        History reviewed. No pertinent surgical history. Family History   Problem Relation Age of Onset    Diabetes Mother     Diabetes Brother      I have reviewed and agree with the history as documented. E-Cigarette/Vaping    E-Cigarette Use Never User      E-Cigarette/Vaping Substances    Nicotine No     THC No     CBD No     Flavoring No     Other No     Unknown No      Social History     Tobacco Use    Smoking status: Some Days     Packs/day: 0.50     Years: 30.00     Total pack years: 15.00     Types: Cigarettes   Vaping Use    Vaping Use: Never used   Substance Use Topics    Alcohol use: Yes     Alcohol/week: 40.0 standard drinks of alcohol     Types: 40 Cans of beer per week     Comment: 8-12 beer per day    Drug use: Not Currently       Review of Systems   Constitutional:  Negative for fever. HENT:  Negative for sore throat. Respiratory:  Negative for shortness of breath. Cardiovascular:  Negative for chest pain. Gastrointestinal:  Negative for abdominal pain. Genitourinary:  Negative for dysuria. Musculoskeletal:  Negative for back pain. Skin:  Negative for rash. Neurological:  Negative for light-headedness. Psychiatric/Behavioral:  Negative for agitation. All other systems reviewed and are negative. Physical Exam  Physical Exam  Vitals reviewed. Constitutional:       General: She is not in acute distress. Appearance: She is well-developed. Comments: Intoxicated   HENT:      Head: Normocephalic. Eyes:      Pupils: Pupils are equal, round, and reactive to light. Cardiovascular:      Rate and Rhythm: Normal rate and regular rhythm. Heart sounds: Normal heart sounds.    Pulmonary:      Effort: Pulmonary effort is normal.      Breath sounds: Normal breath sounds. Abdominal:      General: Bowel sounds are normal. There is no distension. Palpations: Abdomen is soft. Tenderness: There is no abdominal tenderness. There is no guarding. Musculoskeletal:         General: No tenderness or deformity. Normal range of motion. Cervical back: Normal range of motion and neck supple. Skin:     General: Skin is warm and dry. Capillary Refill: Capillary refill takes less than 2 seconds. Neurological:      Mental Status: She is alert and oriented to person, place, and time. Cranial Nerves: No cranial nerve deficit. Sensory: No sensory deficit. Psychiatric:         Behavior: Behavior normal.         Thought Content:  Thought content normal.         Judgment: Judgment normal.         Vital Signs  ED Triage Vitals [12/08/23 0430]   Temperature Pulse Respirations Blood Pressure SpO2   97.6 °F (36.4 °C) 91 22 150/95 95 %      Temp Source Heart Rate Source Patient Position - Orthostatic VS BP Location FiO2 (%)   Temporal Monitor Lying Left arm --      Pain Score       No Pain           Vitals:    12/08/23 0430   BP: 150/95   Pulse: 91   Patient Position - Orthostatic VS: Lying         Visual Acuity      ED Medications  Medications - No data to display    Diagnostic Studies  Results Reviewed       Procedure Component Value Units Date/Time    Ethanol [[id number]]  (Abnormal) Collected: 12/08/23 0438    Lab Status: Final result Specimen: Blood from Hand, Left Updated: 12/08/23 0529     Ethanol Lvl 277 mg/dL     Comprehensive metabolic panel [[id number]]  (Abnormal) Collected: 12/08/23 0438    Lab Status: Final result Specimen: Blood from Hand, Left Updated: 12/08/23 0512     Sodium 131 mmol/L      Potassium 4.0 mmol/L      Chloride 100 mmol/L      CO2 20 mmol/L      ANION GAP 11 mmol/L      BUN 9 mg/dL      Creatinine 0.87 mg/dL      Glucose 93 mg/dL      Calcium 8.4 mg/dL      AST 23 U/L      ALT 14 U/L Alkaline Phosphatase 114 U/L      Total Protein 6.6 g/dL      Albumin 3.8 g/dL      Total Bilirubin 0.26 mg/dL      eGFR 77 ml/min/1.73sq m     Narrative:      [place] guidelines for Chronic Kidney Disease (CKD):     Stage 1 with normal or high GFR (GFR > 90 mL/min/1.73 square meters)    Stage 2 Mild CKD (GFR = 60-89 mL/min/1.73 square meters)    Stage 3A Moderate CKD (GFR = 45-59 mL/min/1.73 square meters)    Stage 3B Moderate CKD (GFR = 30-44 mL/min/1.73 square meters)    Stage 4 Severe CKD (GFR = 15-29 mL/min/1.73 square meters)    Stage 5 End Stage CKD (GFR <15 mL/min/1.73 square meters)  Note: GFR calculation is accurate only with a steady state creatinine    CBC and differential [[id number]]  (Abnormal) Collected: 12/08/23 0436    Lab Status: Final result Specimen: Blood from Hand, Left Updated: 12/08/23 0446     WBC 8.70 Thousand/uL      RBC 3.90 Million/uL      Hemoglobin 11.4 g/dL      Hematocrit 35.9 %      MCV 92 fL      MCH 29.2 pg      MCHC 31.8 g/dL      RDW 18.0 %      MPV 10.1 fL      Platelets 923 Thousands/uL      nRBC 0 /100 WBCs      Neutrophils Relative 35 %      Immat GRANS % 0 %      Lymphocytes Relative 52 %      Monocytes Relative 7 %      Eosinophils Relative 4 %      Basophils Relative 2 %      Neutrophils Absolute 3.08 Thousands/µL      Immature Grans Absolute 0.01 Thousand/uL      Lymphocytes Absolute 4.49 Thousands/µL      Monocytes Absolute 0.64 Thousand/µL      Eosinophils Absolute 0.31 Thousand/µL      Basophils Absolute 0.17 Thousands/µL                    CT head without contrast   Final Result by Betty Gomez DO (12/08 0240)      No acute intracranial abnormality. Workstation performed: [id number]         CT spine cervical without contrast   Final Result by Betty Gomez DO (12/08 2855)      No cervical spine fracture or traumatic malalignment.                   Workstation performed: [id number]         XR chest 1 view portable (Results Pending)              Procedures  Procedures         ED Course  ED Course as of 12/08/23 0629   Fri Dec 08, 2023   0448 Hemoglobin(!): 11.4  chronic                               SBIRT 22yo+      Flowsheet Row Most Recent Value   Initial Alcohol Screen: US AUDIT-C     1. How often do you have a drink containing alcohol? 6 Filed at: 12/08/2023 0431   2. How many drinks containing alcohol do you have on a typical day you are drinking? 6 Filed at: 12/08/2023 0431   3a. Male UNDER 65: How often do you have five or more drinks on one occasion? 0 Filed at: 12/08/2023 0431   3b. FEMALE Any Age, or MALE 65+: How often do you have 4 or more drinks on one occassion? 6 Filed at: 12/08/2023 0431   Audit-C Score 18 Filed at: 12/08/2023 0431   Full Alcohol Screen: US AUDIT    4. How often during the last year have you found that you were not able to stop drinking once you had started? 4 Filed at: 12/08/2023 0431   5. How often during past year have you failed to do what was normally expected of you because of drinking? 4 Filed at: 12/08/2023 0431   6. How often in past year have you needed a first drink in the morning to get yourself going after a heavy drinking session? 0 Filed at: 12/08/2023 0431   7. How often in past year have you had feeling of guilt or remorse after drinking? 2 Filed at: 12/08/2023 0431   8. How often in past year have you been unable to remember what happened night before because you had been drinking? 4 Filed at: 12/08/2023 0431   9. Have you or someone else been injured as a result of your drinking? 4 Filed at: 12/08/2023 0431   10. Has a relative, friend, doctor or other health worker been concerned about your drinking and suggested you cut down? 4 Filed at: 12/08/2023 0431   AUDIT Total Score 40 Filed at: 12/08/2023 9466   LUANN: How many times in the past year have you. .. Used an illegal drug or used a prescription medication for non-medical reasons?  Never Filed at: 12/08/2023 7554 Medical Decision Making  Patient is a 78-year-old female presents for evaluation of alcohol intoxication and falling out of bed. She has benign exam here. CT imaging reviewed and negative. Blood work remarkable for an ethanol of 277. Patient does not have a ride home and has no one to come get her. Plan to observe until clinical sobriety. Amount and/or Complexity of Data Reviewed  Labs: ordered. Decision-making details documented in ED Course. Radiology: ordered. Disposition  Final diagnoses:   None     ED Disposition       None          Follow-up Information    None         Patient's Medications   Discharge Prescriptions    No medications on file       No discharge procedures on file.     PDMP Review       None            ED Provider  Electronically Signed by tablet Take 1 tablet (10 mg total) by mouth daily at bedtime, Starting Thu 8/24/2023, Normal      albuterol (ProAir HFA) 90 mcg/act inhaler Inhale 2 puffs every 6 (six) hours as needed for wheezing or shortness of breath, Starting Wed 2/15/2023, Normal      docusate sodium (COLACE) 100 mg capsule Take 100 mg by mouth 2 (two) times a day, Historical Med      dulaglutide (Trulicity) 1.5 WR/6.9DV injection Inject 0.5 mL (1.5 mg total) under the skin every 7 days, Starting Mon 7/58/5026, Normal      folic acid (FOLVITE) 1 mg tablet Take by mouth daily, Historical Med      furosemide (LASIX) 40 mg tablet TAKE 1 TABLET BY MOUTH EACH MORNING., Historical Med      glucose blood (OneTouch Ultra) test strip Use as instructed, Normal      metoprolol tartrate (LOPRESSOR) 50 mg tablet Take 1 tablet (50 mg total) by mouth every 12 (twelve) hours, Starting Wed 2/15/2023, Normal      mometasone-formoterol (Dulera) 100-5 MCG/ACT inhaler Inhale 2 puffs 2 (two) times a day Rinse mouth after use., Starting Wed 2/15/2023, Normal      omeprazole (PriLOSEC) 40 MG capsule Take 1 capsule (40 mg total) by mouth daily, Starting Wed 8/16/2023, Normal      potassium chloride (Klor-Con) 10 mEq tablet Take 10 mEq by mouth 2 (two) times a day, Historical Med      vitamin B-12 (VITAMIN B-12) 1,000 mcg tablet Take by mouth daily, Historical Med             No discharge procedures on file.     PDMP Review       None            ED Provider  Electronically Signed by             Randy Clark MD  12/12/23 5157

## 2023-12-10 ENCOUNTER — HOSPITAL ENCOUNTER (EMERGENCY)
Facility: HOSPITAL | Age: 48
Discharge: HOME | End: 2023-12-10
Attending: EMERGENCY MEDICINE
Payer: COMMERCIAL

## 2023-12-10 VITALS
RESPIRATION RATE: 18 BRPM | HEART RATE: 102 BPM | SYSTOLIC BLOOD PRESSURE: 110 MMHG | TEMPERATURE: 97.8 F | OXYGEN SATURATION: 95 % | DIASTOLIC BLOOD PRESSURE: 56 MMHG

## 2023-12-10 DIAGNOSIS — F10.929 ACUTE ALCOHOLIC INTOXICATION WITH COMPLICATION (HCC): Primary | ICD-10-CM

## 2023-12-10 DIAGNOSIS — F10.20 ALCOHOLISM (HCC): ICD-10-CM

## 2023-12-10 LAB
ATRIAL RATE: 107 BPM
P AXIS: 61 DEGREES
PR INTERVAL: 154 MS
QRS AXIS: 45 DEGREES
QRSD INTERVAL: 84 MS
QT INTERVAL: 338 MS
QTC INTERVAL: 451 MS
T WAVE AXIS: 50 DEGREES
VENTRICULAR RATE: 107 BPM

## 2023-12-10 PROCEDURE — 93005 ELECTROCARDIOGRAM TRACING: CPT

## 2023-12-10 PROCEDURE — 99284 EMERGENCY DEPT VISIT MOD MDM: CPT

## 2023-12-10 PROCEDURE — 99284 EMERGENCY DEPT VISIT MOD MDM: CPT | Performed by: EMERGENCY MEDICINE

## 2023-12-10 RX ORDER — LANOLIN ALCOHOL/MO/W.PET/CERES
100 CREAM (GRAM) TOPICAL ONCE
Status: COMPLETED | OUTPATIENT
Start: 2023-12-10 | End: 2023-12-10

## 2023-12-10 RX ORDER — FOLIC ACID 1 MG/1
1 TABLET ORAL ONCE
Status: COMPLETED | OUTPATIENT
Start: 2023-12-10 | End: 2023-12-10

## 2023-12-10 RX ADMIN — THIAMINE HCL TAB 100 MG 100 MG: 100 TAB at 03:19

## 2023-12-10 RX ADMIN — FOLIC ACID 1 MG: 1 TABLET ORAL at 03:19

## 2023-12-10 RX ADMIN — Medication 1 TABLET: at 03:19

## 2023-12-10 NOTE — ED PROVIDER NOTES
History  Chief Complaint   Patient presents with    Alcohol Intoxication     Drank 5 beers tonight stated her BP was high because she didn't take her meds     Patient has been seen multiple times in the emergency department the past few months for intoxication. States that she forgot to take her antihypertensive this evening. She took her blood pressure on a home monitor and found her pressure to be slightly elevated. She called EMS. EMS was informed that she was acutely intoxicated. At that time she was hypertensive and tachycardic so she was brought in. Prior to coming in she took her home antihypertensives and these issues resolved. Patient is now intoxicated and requesting to go home. She states she has no complaints. She states that she does not know anyone who is sober and in possession of a car. Prior to Admission Medications   Prescriptions Last Dose Informant Patient Reported? Taking? albuterol (ProAir HFA) 90 mcg/act inhaler   No No   Sig: Inhale 2 puffs every 6 (six) hours as needed for wheezing or shortness of breath   docusate sodium (COLACE) 100 mg capsule   Yes No   Sig: Take 100 mg by mouth 2 (two) times a day   dulaglutide (Trulicity) 1.5 LB/0.9MR injection   No No   Sig: Inject 0.5 mL (1.5 mg total) under the skin every 7 days   folic acid (FOLVITE) 1 mg tablet   Yes No   Sig: Take by mouth daily   furosemide (LASIX) 40 mg tablet   Yes No   Sig: TAKE 1 TABLET BY MOUTH EACH MORNING.    glucose blood (OneTouch Ultra) test strip   No No   Sig: Use as instructed   metoprolol tartrate (LOPRESSOR) 50 mg tablet   No No   Sig: Take 1 tablet (50 mg total) by mouth every 12 (twelve) hours   mometasone-formoterol (Dulera) 100-5 MCG/ACT inhaler   No No   Sig: Inhale 2 puffs 2 (two) times a day Rinse mouth after use.   montelukast (SINGULAIR) 10 mg tablet   No No   Sig: Take 1 tablet (10 mg total) by mouth daily at bedtime   omeprazole (PriLOSEC) 40 MG capsule   No No   Sig: Take 1 capsule (40 mg total) by mouth daily   potassium chloride (Klor-Con) 10 mEq tablet   Yes No   Sig: Take 10 mEq by mouth 2 (two) times a day   vitamin B-12 (VITAMIN B-12) 1,000 mcg tablet   Yes No   Sig: Take by mouth daily      Facility-Administered Medications: None       Past Medical History:   Diagnosis Date    Alcohol abuse     Bipolar disorder ([facility])     Diabetes mellitus ([facility])     Hypertension     Psychiatric disorder        History reviewed. No pertinent surgical history. Family History   Problem Relation Age of Onset    Diabetes Mother     Diabetes Brother      I have reviewed and agree with the history as documented. E-Cigarette/Vaping    E-Cigarette Use Never User      E-Cigarette/Vaping Substances    Nicotine No     THC No     CBD No     Flavoring No     Other No     Unknown No      Social History     Tobacco Use    Smoking status: Some Days     Packs/day: 0.50     Years: 30.00     Total pack years: 15.00     Types: Cigarettes   Vaping Use    Vaping Use: Never used   Substance Use Topics    Alcohol use: Yes     Alcohol/week: 40.0 standard drinks of alcohol     Types: 40 Cans of beer per week     Comment: 8-12 beer per day    Drug use: Not Currently       Review of Systems   Constitutional:  Negative for activity change, chills, fatigue and fever. HENT:  Negative for congestion. Eyes:  Negative for visual disturbance. Respiratory:  Negative for cough, chest tightness and shortness of breath. Cardiovascular:  Negative for chest pain. Gastrointestinal:  Negative for abdominal pain, diarrhea and vomiting. Genitourinary:  Negative for dysuria. Skin:  Negative for rash. Neurological:  Negative for dizziness, weakness and numbness. Physical Exam  Physical Exam  Constitutional:       General: She is not in acute distress. Appearance: She is well-developed. She is not ill-appearing or diaphoretic. HENT:      Head: Normocephalic and atraumatic.       Right Ear: External ear normal.      Left Ear: External ear normal.      Nose: Nose normal.   Eyes:      Conjunctiva/sclera: Conjunctivae normal.      Pupils: Pupils are equal, round, and reactive to light. Cardiovascular:      Rate and Rhythm: Normal rate and regular rhythm. Heart sounds: Normal heart sounds. Pulmonary:      Effort: Pulmonary effort is normal. No respiratory distress. Breath sounds: Normal breath sounds. Abdominal:      General: Bowel sounds are normal.      Palpations: Abdomen is soft. Musculoskeletal:         General: Normal range of motion. Cervical back: Normal range of motion and neck supple. Skin:     General: Skin is warm and dry. Capillary Refill: Capillary refill takes less than 2 seconds. Neurological:      Mental Status: She is alert and oriented to person, place, and time. Comments: Findings consistent with mild alcohol intoxication. Some slightly delayed responses, slightly tangential, minimal slurring.   Some nystagmus when looking to the periphery   Psychiatric:         Behavior: Behavior normal.         Vital Signs  ED Triage Vitals   Temperature Pulse Respirations Blood Pressure SpO2   12/10/23 0312 12/10/23 0312 12/10/23 0312 12/10/23 0312 12/10/23 0312   97.8 °F (36.6 °C) 104 18 139/84 94 %      Temp src Heart Rate Source Patient Position - Orthostatic VS BP Location FiO2 (%)   -- 12/10/23 0312 12/10/23 0400 12/10/23 0312 --    Monitor Lying Left arm       Pain Score       --                  Vitals:    12/10/23 0312 12/10/23 0400   BP: 139/84 114/73   Pulse: 104 98   Patient Position - Orthostatic VS:  Lying         Visual Acuity      ED Medications  Medications   multivitamin-minerals (CENTRUM) tablet 1 tablet (1 tablet Oral Given 42/50/76 9536)   folic acid (FOLVITE) tablet 1 mg (1 mg Oral Given 12/10/23 0319)   thiamine tablet 100 mg (100 mg Oral Given 12/10/23 0319)       Diagnostic Studies  Results Reviewed       None                   No orders to display Procedures  ECG 12 Lead Documentation Only    Date/Time: 12/10/2023 5:41 AM    Performed by: Chiquis Brown MD  Authorized by: Chiquis Brown MD    ECG reviewed by me, the ED Provider: yes    Patient location:  ED  Previous ECG:     Comparison to cardiac monitor: Yes    Interpretation:     Interpretation: normal    Rate:     ECG rate assessment: normal    Rhythm:     Rhythm: sinus rhythm and sinus tachycardia    Ectopy:     Ectopy: none    QRS:     QRS axis:  Normal  Conduction:     Conduction: normal    ST segments:     ST segments:  Normal  T waves:     T waves: normal             ED Course                                             Medical Decision Making  This is a 25-year-old female with alcohol intoxication. Patient's hypertension has resolved when she took her home medication that she is prescribed for hypertension. Tachycardia has resolved. Patient given multivitamins for alcoholism. She declines further workup. Patient is eager to return home in order to go to [facility]. When patient is able to find a sober ride home she will be discharged. Patient has a warm handoff order. He is not currently interested in detox or rehab facilities. Problems Addressed:  Acute alcoholic intoxication with complication [facility]): acute illness or injury  Alcoholism [facility]): chronic illness or injury with exacerbation, progression, or side effects of treatment    Amount and/or Complexity of Data Reviewed  Independent Historian: EMS  External Data Reviewed: notes. ECG/medicine tests: ordered and independent interpretation performed. Details: [place] tachycardia 107 bpm, no ST elevations    Risk  OTC drugs. Prescription drug management. Decision regarding hospitalization.              Disposition  Final diagnoses:   Acute alcoholic intoxication with complication ([address])   Alcoholism ([address])     Time reflects when diagnosis was documented in both MDM as applicable and the Disposition within this note       Time User Action Codes Description Comment    12/10/2023  5:26 AM Yoni Hwang Add [B66.897] Acute alcoholic intoxication with complication ([address])     92/99/5869  5:26 AM Yoni Hwang Add [F10.20] Alcoholism [facility])           ED Disposition       ED Disposition   Transfer to Behavioral Health Condition   --    Date/Time   Sun Dec 10, 2023  5:26 AM    Comment   Fabiana Robles should be transferred out to  and has been medically cleared. Follow-up Information    None         Patient's Medications   Discharge Prescriptions    No medications on file       No discharge procedures on file.     PDMP Review       None            ED Provider  Electronically Signed by             Diallo Vigil MD  12/10/23 4540

## 2023-12-10 NOTE — ED NOTES
Patient ambulated independently to the bathroom. Steady gait at this time.      Kb Dutton RN  12/10/23 7092

## 2023-12-20 ENCOUNTER — CONSULT (OUTPATIENT)
Dept: GASTROENTEROLOGY | Facility: CLINIC | Age: 48
End: 2023-12-20
Payer: COMMERCIAL

## 2023-12-20 VITALS
BODY MASS INDEX: 32.73 KG/M2 | TEMPERATURE: 97.3 F | DIASTOLIC BLOOD PRESSURE: 70 MMHG | HEIGHT: 69 IN | SYSTOLIC BLOOD PRESSURE: 120 MMHG | OXYGEN SATURATION: 99 % | HEART RATE: 82 BPM | WEIGHT: 221 LBS | RESPIRATION RATE: 17 BRPM

## 2023-12-20 DIAGNOSIS — D50.9 IRON DEFICIENCY ANEMIA, UNSPECIFIED IRON DEFICIENCY ANEMIA TYPE: ICD-10-CM

## 2023-12-20 DIAGNOSIS — R74.01 ELEVATED TRANSAMINASE LEVEL: ICD-10-CM

## 2023-12-20 DIAGNOSIS — F10.20 ALCOHOL USE DISORDER, MODERATE, DEPENDENCE (HCC): ICD-10-CM

## 2023-12-20 DIAGNOSIS — R13.10 DYSPHAGIA, UNSPECIFIED TYPE: ICD-10-CM

## 2023-12-20 DIAGNOSIS — R09.A2 GLOBUS SENSATION: Primary | ICD-10-CM

## 2023-12-20 DIAGNOSIS — R19.5 LOOSE STOOLS: ICD-10-CM

## 2023-12-20 DIAGNOSIS — K21.9 GASTROESOPHAGEAL REFLUX DISEASE, UNSPECIFIED WHETHER ESOPHAGITIS PRESENT: ICD-10-CM

## 2023-12-20 DIAGNOSIS — Z53.20 COLON CANCER SCREENING DECLINED: ICD-10-CM

## 2023-12-20 DIAGNOSIS — Z86.19 HISTORY OF HEPATITIS C: ICD-10-CM

## 2023-12-20 DIAGNOSIS — Z91.148 H/O MEDICATION NONCOMPLIANCE: ICD-10-CM

## 2023-12-20 PROCEDURE — 99204 OFFICE O/P NEW MOD 45 MIN: CPT | Performed by: PHYSICIAN ASSISTANT

## 2023-12-20 RX ORDER — OMEPRAZOLE 40 MG/1
40 CAPSULE, DELAYED RELEASE ORAL DAILY
Qty: 90 CAPSULE | Refills: 1 | Status: SHIPPED | OUTPATIENT
Start: 2023-12-20
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
## 2023-12-20 NOTE — PATIENT INSTRUCTIONS
The origin of your trouble swallowing is not completely clear at this time.  We are going to investigate with an upper endoscopy to look inside of your esophagus to see if there could be something going on.  Go back on the omeprazole every morning to help protect your GI tract.  If the workup is unrevealing, we may need to think about 1 of those swallow test or even measuring the movement of your esophagus.  Eat a soft diet at this time, only eat while sitting upright, take small bites and chew very thoroughly, coat your solids and a gravy or a sauce, alternate with small sips of liquid, and do not lay down for a few hours after eating before you go to bed.    For the bowels, try a fiber powder like Benefiber or Metamucil.  This you can get over-the-counter at the drugstore.  This should to put some bulk and form into your poop.    Keep up the good work with no alcohol.  I will check your liver now.  If you change your mind about the colonoscopy, please let us know.  This is truly the best way to prevent colon cancer.    Scheduled date of EGD(as of today):01/04/2024  Physician performing EGD:Koffi  Location of EGD:[facility]  Instructions reviewed with patient by: Kamilla  Clearances: none  Patient not currently taking Trulicity as she is unable to get from Pharmacy
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
## 2023-12-30 ENCOUNTER — HOSPITAL ENCOUNTER (OUTPATIENT)
Dept: ULTRASOUND IMAGING | Facility: HOSPITAL | Age: 48
Discharge: HOME | End: 2023-12-30
Payer: COMMERCIAL

## 2023-12-30 DIAGNOSIS — Z86.19 HISTORY OF HEPATITIS C: ICD-10-CM

## 2023-12-30 PROCEDURE — 76700 US EXAM ABDOM COMPLETE: CPT

## 2024-01-03 RX ORDER — SODIUM CHLORIDE 9 MG/ML
125 INJECTION, SOLUTION INTRAVENOUS CONTINUOUS
Status: CANCELLED | OUTPATIENT
Start: 2024-01-03

## 2024-01-04 ENCOUNTER — HOSPITAL ENCOUNTER (OUTPATIENT)
Dept: GASTROENTEROLOGY | Facility: HOSPITAL | Age: 49
Setting detail: OUTPATIENT SURGERY
End: 2024-01-04
Attending: INTERNAL MEDICINE
Payer: COMMERCIAL

## 2024-01-04 ENCOUNTER — ANESTHESIA (OUTPATIENT)
Dept: GASTROENTEROLOGY | Facility: HOSPITAL | Age: 49
End: 2024-01-04

## 2024-01-04 ENCOUNTER — ANESTHESIA EVENT (OUTPATIENT)
Dept: GASTROENTEROLOGY | Facility: HOSPITAL | Age: 49
End: 2024-01-04

## 2024-01-04 VITALS
HEART RATE: 85 BPM | RESPIRATION RATE: 17 BRPM | OXYGEN SATURATION: 97 % | DIASTOLIC BLOOD PRESSURE: 83 MMHG | SYSTOLIC BLOOD PRESSURE: 128 MMHG

## 2024-01-04 DIAGNOSIS — R13.10 DYSPHAGIA, UNSPECIFIED TYPE: ICD-10-CM

## 2024-01-04 DIAGNOSIS — R09.A2 GLOBUS SENSATION: ICD-10-CM

## 2024-01-04 DIAGNOSIS — D64.9 ANEMIA, UNSPECIFIED TYPE: Primary | ICD-10-CM

## 2024-01-04 PROBLEM — F10.10 ALCOHOL ABUSE: Status: ACTIVE | Noted: 2024-01-04

## 2024-01-04 LAB — GLUCOSE SERPL-MCNC: 94 MG/DL (ref 65–140)

## 2024-01-04 PROCEDURE — 82948 REAGENT STRIP/BLOOD GLUCOSE: CPT

## 2024-01-04 PROCEDURE — 88305 TISSUE EXAM BY PATHOLOGIST: CPT | Performed by: STUDENT IN AN ORGANIZED HEALTH CARE EDUCATION/TRAINING PROGRAM

## 2024-01-04 PROCEDURE — 43239 EGD BIOPSY SINGLE/MULTIPLE: CPT | Performed by: INTERNAL MEDICINE

## 2024-01-04 RX ORDER — SODIUM CHLORIDE 9 MG/ML
125 INJECTION, SOLUTION INTRAVENOUS CONTINUOUS
Status: DISPENSED | OUTPATIENT
Start: 2024-01-04

## 2024-01-04 RX ORDER — METOCLOPRAMIDE 10 MG/1
10 TABLET ORAL 4 TIMES DAILY
Qty: 2 TABLET | Refills: 0 | Status: SHIPPED | OUTPATIENT
Start: 2024-01-04

## 2024-01-04 RX ORDER — PROPOFOL 10 MG/ML
INJECTION, EMULSION INTRAVENOUS AS NEEDED
Status: DISCONTINUED | OUTPATIENT
Start: 2024-01-04 | End: 2024-01-04

## 2024-01-04 RX ORDER — SODIUM CHLORIDE 9 MG/ML
INJECTION, SOLUTION INTRAVENOUS CONTINUOUS PRN
Status: DISCONTINUED | OUTPATIENT
Start: 2024-01-04 | End: 2024-01-04

## 2024-01-04 RX ORDER — LIDOCAINE HYDROCHLORIDE 20 MG/ML
INJECTION, SOLUTION EPIDURAL; INFILTRATION; INTRACAUDAL; PERINEURAL AS NEEDED
Status: DISCONTINUED | OUTPATIENT
Start: 2024-01-04 | End: 2024-01-04

## 2024-01-04 RX ADMIN — PROPOFOL 30 MG: 10 INJECTION, EMULSION INTRAVENOUS at 07:35

## 2024-01-04 RX ADMIN — SODIUM CHLORIDE: 0.9 INJECTION, SOLUTION INTRAVENOUS at 07:29

## 2024-01-04 RX ADMIN — LIDOCAINE HYDROCHLORIDE 100 MG: 20 INJECTION, SOLUTION EPIDURAL; INFILTRATION; INTRACAUDAL at 07:34

## 2024-01-04 RX ADMIN — PROPOFOL 120 MG: 10 INJECTION, EMULSION INTRAVENOUS at 07:34

## 2024-01-04 NOTE — ANESTHESIA PREPROCEDURE EVALUATION
Procedure:  EGD    Relevant Problems   HEMATOLOGY   (+) Anemia      NEURO/PSYCH   (+) Schizophrenia (HCC)      PULMONARY   (+) Mild intermittent asthma without complication      Other   (+) Alcohol use disorder, moderate, dependence (HCC)   (+) Bipolar 1 disorder, depressed, moderate (HCC)   (+) Hepatitis C virus infection cured after antiviral drug therapy   (+) Psychiatric disorder        Physical Exam    Airway    Mallampati score: III  TM Distance: >3 FB  Neck ROM: full     Dental   Comment: edentulous     Cardiovascular  Rhythm: regular, Rate: normal    Pulmonary   Breath sounds clear to auscultation    Other Findings  post-pubertal.      Anesthesia Plan  ASA Score- 3     Anesthesia Type- IV sedation with anesthesia with ASA Monitors.         Additional Monitors:     Airway Plan:     Comment: GA prn.       Plan Factors-    Chart reviewed.    Patient summary reviewed.    Patient is a current smoker.  Patient instructed to abstain from smoking on day of procedure. Patient did not smoke on day of surgery.            Induction- intravenous.    Postoperative Plan-     Informed Consent- Anesthetic plan and risks discussed with patient.  I personally reviewed this patient with the CRNA. Discussed and agreed on the Anesthesia Plan with the CRNA..

## 2024-01-04 NOTE — ANESTHESIA POSTPROCEDURE EVALUATION
Post-Op Assessment Note    CV Status:  Stable    Pain management: adequate       Mental Status:  Alert and awake   Hydration Status:  Euvolemic   PONV Controlled:  Controlled   Airway Patency:  Patent     Post Op Vitals Reviewed: Yes      Staff: Anesthesiologist, CRNA               BP      Temp      Pulse     Resp      SpO2      /83   Pulse 85   Resp 17   SpO2 97%

## 2024-01-08 ENCOUNTER — OFFICE VISIT (OUTPATIENT)
Dept: FAMILY MEDICINE CLINIC | Facility: CLINIC | Age: 49
End: 2024-01-08
Payer: COMMERCIAL

## 2024-01-08 VITALS
WEIGHT: 238.2 LBS | HEIGHT: 69 IN | HEART RATE: 105 BPM | DIASTOLIC BLOOD PRESSURE: 84 MMHG | OXYGEN SATURATION: 98 % | BODY MASS INDEX: 35.28 KG/M2 | RESPIRATION RATE: 18 BRPM | TEMPERATURE: 98.5 F | SYSTOLIC BLOOD PRESSURE: 128 MMHG

## 2024-01-08 DIAGNOSIS — J45.21 MILD INTERMITTENT ASTHMA WITH ACUTE EXACERBATION: Primary | ICD-10-CM

## 2024-01-08 DIAGNOSIS — F10.920 ALCOHOLIC INTOXICATION WITHOUT COMPLICATION (HCC): ICD-10-CM

## 2024-01-08 DIAGNOSIS — J45.20 MILD INTERMITTENT ASTHMA WITHOUT COMPLICATION: ICD-10-CM

## 2024-01-08 DIAGNOSIS — F10.20 ALCOHOL USE DISORDER, MODERATE, DEPENDENCE (HCC): ICD-10-CM

## 2024-01-08 PROCEDURE — 88305 TISSUE EXAM BY PATHOLOGIST: CPT | Performed by: STUDENT IN AN ORGANIZED HEALTH CARE EDUCATION/TRAINING PROGRAM

## 2024-01-08 PROCEDURE — 99213 OFFICE O/P EST LOW 20 MIN: CPT | Performed by: INTERNAL MEDICINE

## 2024-01-08 RX ORDER — MOMETASONE FUROATE AND FORMOTEROL FUMARATE DIHYDRATE 100; 5 UG/1; UG/1
2 AEROSOL RESPIRATORY (INHALATION) 2 TIMES DAILY
Qty: 13 G | Refills: 3 | Status: SHIPPED | OUTPATIENT
Start: 2024-01-08

## 2024-01-08 RX ORDER — ALBUTEROL SULFATE 90 UG/1
2 AEROSOL, METERED RESPIRATORY (INHALATION) EVERY 6 HOURS PRN
Qty: 18 G | Refills: 3 | Status: SHIPPED | OUTPATIENT
Start: 2024-01-08

## 2024-01-08 RX ORDER — PREDNISONE 20 MG/1
40 TABLET ORAL DAILY
Qty: 10 TABLET | Refills: 0 | Status: SHIPPED | OUTPATIENT
Start: 2024-01-08 | End: 2024-01-13

## 2024-01-08 RX ORDER — MONTELUKAST SODIUM 10 MG/1
10 TABLET ORAL
Qty: 90 TABLET | Refills: 1 | Status: SHIPPED | OUTPATIENT
Start: 2024-01-08

## 2024-01-08 NOTE — PROGRESS NOTES
"[facility]        NAME: Kathleen Robles is a 48 y.o. female  : 1975    MRN: [id number]  DATE: 2024  TIME: 2:40 PM    Assessment and Plan   1. Mild intermittent asthma with acute exacerbation  -     albuterol (ProAir HFA) 90 mcg/act inhaler; Inhale 2 puffs every 6 (six) hours as needed for wheezing or shortness of breath  -     predniSONE 20 mg tablet; Take 2 tablets (40 mg total) by mouth daily for 5 days    2. Alcohol use disorder, moderate, dependence (HCC)    3. Mild intermittent asthma without complication  -     montelukast (SINGULAIR) 10 mg tablet; Take 1 tablet (10 mg total) by mouth daily at bedtime  -     mometasone-formoterol (Dulera) 100-5 MCG/ACT inhaler; Inhale 2 puffs 2 (two) times a day Rinse mouth after use.  -     albuterol (ProAir HFA) 90 mcg/act inhaler; Inhale 2 puffs every 6 (six) hours as needed for wheezing or shortness of breath    4. Alcoholic intoxication without complication (HCC)             Chief Complaint     Chief Complaint   Patient presents with   • Asthma         History of Present Illness       49yo female with history of bipolar disorder, alcohol use disorder presents for acute visit due to needing refill of her inhalers. Pt reports she does not carry them with her and woke up today with dyspnea. Also ran out of singulair and didn't call for refills. Denies chest pain or pressure. Admits to having \"a few\" beers earlier today. Does not drive, walked here with her boyfriend.         Review of Systems   Review of Systems   Constitutional:  Negative for appetite change, chills, fatigue and fever.   Respiratory:  Positive for chest tightness, shortness of breath and wheezing.          Current Medications       Current Outpatient Medications:   •  albuterol (ProAir HFA) 90 mcg/act inhaler, Inhale 2 puffs every 6 (six) hours as needed for wheezing or shortness of breath, Disp: 18 g, Rfl: 3  •  glucose blood (OneTouch Ultra) test strip, Use as " instructed, Disp: 100 each, Rfl: 1  •  mometasone-formoterol (Dulera) 100-5 MCG/ACT inhaler, Inhale 2 puffs 2 (two) times a day Rinse mouth after use., Disp: 13 g, Rfl: 3  •  montelukast (SINGULAIR) 10 mg tablet, Take 1 tablet (10 mg total) by mouth daily at bedtime, Disp: 90 tablet, Rfl: 1  •  omeprazole (PriLOSEC) 40 MG capsule, Take 1 capsule (40 mg total) by mouth daily, Disp: 90 capsule, Rfl: 1  •  predniSONE 20 mg tablet, Take 2 tablets (40 mg total) by mouth daily for 5 days, Disp: 10 tablet, Rfl: 0  •  vitamin B-12 (VITAMIN B-12) 1,000 mcg tablet, Take by mouth daily, Disp: , Rfl:   •  docusate sodium (COLACE) 100 mg capsule, Take 100 mg by mouth 2 (two) times a day (Patient not taking: Reported on 1/8/2024), Disp: , Rfl:   •  folic acid (FOLVITE) 1 mg tablet, Take by mouth daily (Patient not taking: Reported on 1/8/2024), Disp: , Rfl:   •  furosemide (LASIX) 40 mg tablet, TAKE 1 TABLET BY MOUTH EACH MORNING. (Patient not taking: Reported on 1/8/2024), Disp: , Rfl:   •  metoclopramide (Reglan) 10 mg tablet, Take 1 tablet (10 mg total) by mouth 4 (four) times a day (Patient not taking: Reported on 1/8/2024), Disp: 2 tablet, Rfl: 0  •  metoprolol tartrate (LOPRESSOR) 50 mg tablet, Take 1 tablet (50 mg total) by mouth every 12 (twelve) hours (Patient not taking: Reported on 1/8/2024), Disp: 180 tablet, Rfl: 1  •  potassium chloride (Klor-Con) 10 mEq tablet, Take 10 mEq by mouth 2 (two) times a day (Patient not taking: Reported on 1/8/2024), Disp: , Rfl:   No current facility-administered medications for this visit.    Current Allergies     Allergies as of 01/08/2024 - Reviewed 01/08/2024   Allergen Reaction Noted   • Pollen extract Eye Swelling 01/07/2023            The following portions of the patient's history were reviewed and updated as appropriate: allergies, current medications, past family history, past medical history, past social history, past surgical history and problem list.     Past Medical  "History:   Diagnosis Date   • Alcohol abuse 1/4/2024   • Bipolar disorder (HCC)    • Diabetes mellitus (HCC)    • Hypertension    • Psychiatric disorder        History reviewed. No pertinent surgical history.    Family History   Problem Relation Age of Onset   • Diabetes Mother    • Diabetes Brother          Medications have been verified.        Objective   /84   Pulse 105   Temp 98.5 °F (36.9 °C) (Tympanic)   Resp 18   Ht 5' 9\" (1.753 m)   Wt 108 kg (238 lb 3.2 oz)   SpO2 98%   BMI 35.18 kg/m²        Physical Exam     Physical Exam  Vitals reviewed.   Constitutional:       General: She is not in acute distress.     Appearance: She is obese.   Cardiovascular:      Rate and Rhythm: Normal rate and regular rhythm.      Heart sounds: No murmur heard.     No friction rub. No gallop.   Pulmonary:      Effort: Pulmonary effort is normal. No respiratory distress.      Breath sounds: No wheezing, rhonchi or rales.   Neurological:      General: No focal deficit present.      Mental Status: She is alert.   Psychiatric:         Attention and Perception: She is attentive. She does not perceive auditory or visual hallucinations.         Mood and Affect: Affect normal. Mood is elated.         Speech: Speech normal.         Behavior: Behavior normal. Behavior is cooperative.             Results:  Lab Results   Component Value Date    SODIUM 131 (L) 12/08/2023    K 4.0 12/08/2023     12/08/2023    CO2 20 (L) 12/08/2023    BUN 9 12/08/2023    CREATININE 0.87 12/08/2023    GLUC 93 12/08/2023    CALCIUM 8.4 12/08/2023       Lab Results   Component Value Date    HGBA1C 5.5 02/15/2023       Lab Results   Component Value Date    WBC 8.70 12/08/2023    HGB 11.4 (L) 12/08/2023    HCT 35.9 12/08/2023    MCV 92 12/08/2023     12/08/2023         "

## 2024-01-10 ENCOUNTER — TELEPHONE (OUTPATIENT)
Dept: FAMILY MEDICINE CLINIC | Facility: CLINIC | Age: 49
End: 2024-01-10

## 2024-01-10 NOTE — TELEPHONE ENCOUNTER
Pt called and states she was in yesterday and since her ankle has swollen which she didn't realize until after visit. States she has taken a water pill for this once before and it helped and would like to take again. Please advise.

## 2024-01-15 DIAGNOSIS — K76.0 HEPATIC STEATOSIS: Primary | ICD-10-CM

## 2024-01-15 NOTE — PROGRESS NOTES
Pt with ultrasound that demonstrates hepatomegaly and fibrofatty infiltration.  NAFLD fibrosis score indeterminant.  Suspect etiology is underlying EtOH related liver disease and a component of metabolic fatty liver.  Recommend EtOH cessation, weight loss, etc. to be discussed at follow-up office visit in greater detail.  Elastography ordered.    NAFLD Fibrosis Score is: -.872    NAFLD Score Correlated Fibrosis Severity   <-1.455 F0-F2   -1.455-0.676 Indeterminate Score   >0.676 F3-F4   **Fibrosis Severity Scale: F0 = no fibrosis; F1= mild fibrosis; F2 = moderate fibrosis; F3 = severe fibrosis; F4 = cirrhosis    NAFLD Score Component Values:  Component Value Date   Age: 48 y.o.     BMI: 35.18 kg/m²    IFG or DM: Yes    AST: 23 U/L 12/8/2023   ALT: 14 U/L 12/8/2023   Platelet: 354 Thousands/uL 12/8/2023   Albumin: 3.8 g/dL 12/8/2023

## 2024-01-16 ENCOUNTER — TELEPHONE (OUTPATIENT)
Age: 49
End: 2024-01-16

## 2024-01-16 NOTE — TELEPHONE ENCOUNTER
Pt call transferred to nurses line     Pt made aware of results and recommendations. All questions answered.

## 2024-01-17 ENCOUNTER — TELEPHONE (OUTPATIENT)
Dept: GASTROENTEROLOGY | Facility: CLINIC | Age: 49
End: 2024-01-17

## 2024-01-17 NOTE — TELEPHONE ENCOUNTER
Scheduled date of EGD(as of today):04.22.24  Physician performing EGD:DR CORNEJO  Location of EGD:[facility]  Instructions reviewed with patient by:MAILED  Clearances: N/A

## 2024-01-18 ENCOUNTER — TELEPHONE (OUTPATIENT)
Dept: FAMILY MEDICINE CLINIC | Facility: CLINIC | Age: 49
End: 2024-01-18

## 2024-01-18 NOTE — TELEPHONE ENCOUNTER
I'm confused. Is she asking for a refill or needs a different medication? I also recommends she cuts down on alcohol, which has a lot of carbs.

## 2024-01-18 NOTE — TELEPHONE ENCOUNTER
Called and spoke with patient. States she has been out of Trulicity for a couple of months, and the pharmacy cannot get her 1.5 to refill. She is asking if a different dose can be called in

## 2024-01-18 NOTE — TELEPHONE ENCOUNTER
Pt called and states her sugar is 204 and needs insulin and is referring to Trulicity as her injection and she can't get the 1.5 and would like another prescription sent over. Please advise.

## 2024-01-19 DIAGNOSIS — I10 PRIMARY HYPERTENSION: ICD-10-CM

## 2024-01-19 DIAGNOSIS — F10.20 ALCOHOL USE DISORDER, MODERATE, DEPENDENCE (HCC): ICD-10-CM

## 2024-01-19 DIAGNOSIS — R73.03 PRE-DIABETES: Primary | ICD-10-CM

## 2024-01-19 DIAGNOSIS — E66.09 CLASS 1 OBESITY DUE TO EXCESS CALORIES WITH SERIOUS COMORBIDITY AND BODY MASS INDEX (BMI) OF 31.0 TO 31.9 IN ADULT: ICD-10-CM

## 2024-01-19 DIAGNOSIS — J45.21 MILD INTERMITTENT ASTHMA WITH ACUTE EXACERBATION: ICD-10-CM

## 2024-01-20 ENCOUNTER — NURSE TRIAGE (OUTPATIENT)
Dept: OTHER | Facility: OTHER | Age: 49
End: 2024-01-20

## 2024-01-20 NOTE — TELEPHONE ENCOUNTER
"Regarding: High Blood Sugar/ Medication Request  ----- Message from Antony Sweeney sent at 1/20/2024 11:34 AM EST -----  \"My blood sugar is at 211 and now I want my Trulicity 0.75 MG/0.5ML injection which I haven't had in months.\"    "

## 2024-01-20 NOTE — TELEPHONE ENCOUNTER
Reason for Disposition  • Message left on identified voice mail    Protocols used: No Contact or Duplicate Contact Call-ADULT-

## 2024-01-23 ENCOUNTER — TELEPHONE (OUTPATIENT)
Dept: FAMILY MEDICINE CLINIC | Facility: CLINIC | Age: 49
End: 2024-01-23

## 2024-01-23 NOTE — TELEPHONE ENCOUNTER
Pt left voicemail stating she wants a different doctor as her Trulicity was never sent in.    Per chart review, patient never had labs done like PCP suggested.     Left message for patient requesting call back.

## 2024-01-24 NOTE — TELEPHONE ENCOUNTER
Spoke with patient. Reminded her she needed labs done in order to have the proper dose of Trulicity sent. She verbalized understanding.

## 2024-02-09 ENCOUNTER — HOSPITAL ENCOUNTER (OUTPATIENT)
Dept: ULTRASOUND IMAGING | Facility: HOSPITAL | Age: 49
Discharge: HOME | End: 2024-02-09
Payer: COMMERCIAL

## 2024-02-09 DIAGNOSIS — K76.0 HEPATIC STEATOSIS: ICD-10-CM

## 2024-02-09 PROCEDURE — 76981 USE PARENCHYMA: CPT

## 2024-02-23 ENCOUNTER — TELEPHONE (OUTPATIENT)
Age: 49
End: 2024-02-23

## 2024-02-23 NOTE — TELEPHONE ENCOUNTER
Spoke with pt gave her US results of liver. Pt was not aware she had appt today but said she will be there.

## 2024-02-28 ENCOUNTER — APPOINTMENT (OUTPATIENT)
Dept: RADIOLOGY | Facility: CLINIC | Age: 49
End: 2024-02-28
Payer: COMMERCIAL

## 2024-02-28 ENCOUNTER — OFFICE VISIT (OUTPATIENT)
Dept: FAMILY MEDICINE CLINIC | Facility: CLINIC | Age: 49
End: 2024-02-28
Payer: COMMERCIAL

## 2024-02-28 ENCOUNTER — APPOINTMENT (OUTPATIENT)
Dept: LAB | Facility: CLINIC | Age: 49
End: 2024-02-28
Payer: COMMERCIAL

## 2024-02-28 VITALS
WEIGHT: 229.2 LBS | TEMPERATURE: 97.8 F | SYSTOLIC BLOOD PRESSURE: 144 MMHG | HEART RATE: 108 BPM | BODY MASS INDEX: 33.95 KG/M2 | HEIGHT: 69 IN | DIASTOLIC BLOOD PRESSURE: 86 MMHG | RESPIRATION RATE: 18 BRPM | OXYGEN SATURATION: 98 %

## 2024-02-28 DIAGNOSIS — I10 HYPERTENSION, UNSPECIFIED TYPE: ICD-10-CM

## 2024-02-28 DIAGNOSIS — Z00.00 ANNUAL PHYSICAL EXAM: Primary | ICD-10-CM

## 2024-02-28 DIAGNOSIS — G89.29 CHRONIC PAIN OF RIGHT KNEE: ICD-10-CM

## 2024-02-28 DIAGNOSIS — F10.10 ALCOHOL ABUSE: ICD-10-CM

## 2024-02-28 DIAGNOSIS — D64.9 ANEMIA, UNSPECIFIED TYPE: ICD-10-CM

## 2024-02-28 DIAGNOSIS — F10.20 ALCOHOL USE DISORDER, MODERATE, DEPENDENCE (HCC): ICD-10-CM

## 2024-02-28 DIAGNOSIS — R73.03 PRE-DIABETES: ICD-10-CM

## 2024-02-28 DIAGNOSIS — Z12.31 ENCOUNTER FOR SCREENING MAMMOGRAM FOR BREAST CANCER: ICD-10-CM

## 2024-02-28 DIAGNOSIS — M25.561 CHRONIC PAIN OF RIGHT KNEE: ICD-10-CM

## 2024-02-28 PROBLEM — F99 PSYCHIATRIC DISORDER: Status: RESOLVED | Noted: 2023-07-11 | Resolved: 2024-02-28

## 2024-02-28 LAB
ALBUMIN SERPL BCP-MCNC: 4.3 G/DL (ref 3.5–5)
ALP SERPL-CCNC: 121 U/L (ref 34–104)
ALT SERPL W P-5'-P-CCNC: 25 U/L (ref 7–52)
ANION GAP SERPL CALCULATED.3IONS-SCNC: 11 MMOL/L
AST SERPL W P-5'-P-CCNC: 46 U/L (ref 13–39)
BASOPHILS # BLD AUTO: 0.11 THOUSANDS/ÂΜL (ref 0–0.1)
BASOPHILS NFR BLD AUTO: 1 % (ref 0–1)
BILIRUB SERPL-MCNC: 0.69 MG/DL (ref 0.2–1)
BUN SERPL-MCNC: 8 MG/DL (ref 5–25)
CALCIUM SERPL-MCNC: 10.1 MG/DL (ref 8.4–10.2)
CHLORIDE SERPL-SCNC: 106 MMOL/L (ref 96–108)
CO2 SERPL-SCNC: 23 MMOL/L (ref 21–32)
CREAT SERPL-MCNC: 1 MG/DL (ref 0.6–1.3)
EOSINOPHIL # BLD AUTO: 0.11 THOUSAND/ÂΜL (ref 0–0.61)
EOSINOPHIL NFR BLD AUTO: 1 % (ref 0–6)
ERYTHROCYTE [DISTWIDTH] IN BLOOD BY AUTOMATED COUNT: 21 % (ref 11.6–15.1)
FERRITIN SERPL-MCNC: 22 NG/ML (ref 11–307)
FOLATE SERPL-MCNC: 7.6 NG/ML
GFR SERPL CREATININE-BSD FRML MDRD: 65 ML/MIN/1.73SQ M
GLUCOSE SERPL-MCNC: 127 MG/DL (ref 65–140)
HCT VFR BLD AUTO: 38.3 % (ref 34.8–46.1)
HGB BLD-MCNC: 12 G/DL (ref 11.5–15.4)
IMM GRANULOCYTES # BLD AUTO: 0.03 THOUSAND/UL (ref 0–0.2)
IMM GRANULOCYTES NFR BLD AUTO: 0 % (ref 0–2)
IRON SATN MFR SERPL: 10 % (ref 15–50)
IRON SERPL-MCNC: 41 UG/DL (ref 50–212)
LYMPHOCYTES # BLD AUTO: 1.53 THOUSANDS/ÂΜL (ref 0.6–4.47)
LYMPHOCYTES NFR BLD AUTO: 19 % (ref 14–44)
MCH RBC QN AUTO: 28 PG (ref 26.8–34.3)
MCHC RBC AUTO-ENTMCNC: 31.3 G/DL (ref 31.4–37.4)
MCV RBC AUTO: 90 FL (ref 82–98)
MONOCYTES # BLD AUTO: 0.58 THOUSAND/ÂΜL (ref 0.17–1.22)
MONOCYTES NFR BLD AUTO: 7 % (ref 4–12)
NEUTROPHILS # BLD AUTO: 5.62 THOUSANDS/ÂΜL (ref 1.85–7.62)
NEUTS SEG NFR BLD AUTO: 72 % (ref 43–75)
NRBC BLD AUTO-RTO: 0 /100 WBCS
PLATELET # BLD AUTO: 232 THOUSANDS/UL (ref 149–390)
PMV BLD AUTO: 10.4 FL (ref 8.9–12.7)
POTASSIUM SERPL-SCNC: 4.3 MMOL/L (ref 3.5–5.3)
PROT SERPL-MCNC: 7 G/DL (ref 6.4–8.4)
RBC # BLD AUTO: 4.28 MILLION/UL (ref 3.81–5.12)
SODIUM SERPL-SCNC: 140 MMOL/L (ref 135–147)
TIBC SERPL-MCNC: 398 UG/DL (ref 250–450)
UIBC SERPL-MCNC: 357 UG/DL (ref 155–355)
VIT B12 SERPL-MCNC: 277 PG/ML (ref 180–914)
WBC # BLD AUTO: 7.98 THOUSAND/UL (ref 4.31–10.16)

## 2024-02-28 PROCEDURE — 36415 COLL VENOUS BLD VENIPUNCTURE: CPT

## 2024-02-28 PROCEDURE — 83540 ASSAY OF IRON: CPT

## 2024-02-28 PROCEDURE — 80053 COMPREHEN METABOLIC PANEL: CPT

## 2024-02-28 PROCEDURE — 99214 OFFICE O/P EST MOD 30 MIN: CPT | Performed by: INTERNAL MEDICINE

## 2024-02-28 PROCEDURE — 82746 ASSAY OF FOLIC ACID SERUM: CPT

## 2024-02-28 PROCEDURE — 99396 PREV VISIT EST AGE 40-64: CPT | Performed by: INTERNAL MEDICINE

## 2024-02-28 PROCEDURE — 83550 IRON BINDING TEST: CPT

## 2024-02-28 PROCEDURE — 82728 ASSAY OF FERRITIN: CPT

## 2024-02-28 PROCEDURE — 82607 VITAMIN B-12: CPT

## 2024-02-28 PROCEDURE — 73562 X-RAY EXAM OF KNEE 3: CPT

## 2024-02-28 PROCEDURE — 85025 COMPLETE CBC W/AUTO DIFF WBC: CPT

## 2024-02-28 RX ORDER — METOPROLOL TARTRATE 50 MG/1
50 TABLET, FILM COATED ORAL EVERY 12 HOURS SCHEDULED
Qty: 180 TABLET | Refills: 1 | Status: SHIPPED | OUTPATIENT
Start: 2024-02-28

## 2024-02-28 RX ORDER — NALTREXONE HYDROCHLORIDE 50 MG/1
50 TABLET, FILM COATED ORAL DAILY
Qty: 90 TABLET | Refills: 0 | Status: SHIPPED | OUTPATIENT
Start: 2024-02-28

## 2024-02-28 RX ORDER — FOLIC ACID 1 MG/1
1 TABLET ORAL DAILY
Qty: 90 TABLET | Refills: 0 | Status: SHIPPED | OUTPATIENT
Start: 2024-02-28

## 2024-02-28 NOTE — PROGRESS NOTES
ADULT ANNUAL PHYSICAL  [facility] - [facility] PRIMARY [facility]ARE    NAME: Kathleen Robles  AGE: 48 y.o. SEX: female  : 1975     DATE: 2024     Assessment and Plan:     Problem List Items Addressed This Visit     Alcohol use disorder, moderate, dependence (HCC)    Relevant Medications    naltrexone (REVIA) 50 mg tablet    Other Relevant Orders    CBC and differential    Comprehensive metabolic panel    Folate    Vitamin B12    Anemia    Relevant Medications    folic acid (FOLVITE) 1 mg tablet    Other Relevant Orders    CBC and differential    Comprehensive metabolic panel    Folate    Vitamin B12    Iron Panel (Includes Ferritin, Iron Sat%, Iron, and TIBC)    Pre-diabetes    Relevant Orders    CBC and differential    Comprehensive metabolic panel    Hemoglobin A1C    Alcohol abuse    Relevant Medications    naltrexone (REVIA) 50 mg tablet    Other Relevant Orders    CBC and differential    Comprehensive metabolic panel    Folate    Vitamin B12   Other Visit Diagnoses     Annual physical exam    -  Primary    Hypertension, unspecified type        Relevant Medications    metoprolol tartrate (LOPRESSOR) 50 mg tablet    Other Relevant Orders    CBC and differential    Comprehensive metabolic panel    Lipid Panel with Direct LDL reflex    Encounter for screening mammogram for breast cancer        Relevant Orders    Mammo screening bilateral w 3d & cad    Chronic pain of right knee        Relevant Orders    XR knee 3 vw right non injury          Immunizations and preventive care screenings were discussed with patient today. Appropriate education was printed on patient's after visit summary.    Counseling:  Alcohol/drug use: discussed moderation in alcohol intake, the recommendations for healthy alcohol use, and avoidance of illicit drug use.  Dental Health: discussed importance of regular tooth brushing, flossing, and dental visits.  Injury prevention: discussed safety/seat belts,  safety helmets, smoke detectors, carbon dioxide detectors, and smoking near bedding or upholstery.  Sexual health: discussed sexually transmitted diseases, partner selection, use of condoms, avoidance of unintended pregnancy, and contraceptive alternatives.  Exercise: the importance of regular exercise/physical activity was discussed. Recommend exercise 3-5 times per week for at least 30 minutes.       Tobacco Cessation Counseling: Tobacco cessation counseling was provided. The patient is sincerely urged to quit consumption of tobacco. She is ready to quit tobacco. Medication options discussed.         Return in about 6 months (around 8/28/2024) for Recheck.     Chief Complaint:     Chief Complaint   Patient presents with   • Annual Exam     Experiencing burning while having bowel movements.      History of Present Illness:     Adult Annual Physical   Patient here for a comprehensive physical exam. The patient reports problems - quit drinking last week after ER visit and US showing fatty liver. Reports some cravings but no DT or withdrawal symptoms .    Diet and Physical Activity  Diet/Nutrition: well balanced diet.   Exercise: walking.      Depression Screening  PHQ-2/9 Depression Screening    Little interest or pleasure in doing things: 0 - not at all  Feeling down, depressed, or hopeless: 0 - not at all  PHQ-2 Score: 0  PHQ-2 Interpretation: Negative depression screen       General Health  Sleep: sleeps poorly.   Hearing: normal - bilateral.  Vision: no vision problems.   Dental: no dental visits for >1 year.       /GYN Health  Follows with gynecology? no   Patient is: postmenopausal  Last menstrual period: NA  Contraceptive method: menopause.    Advanced Care Planning  Do you have an advanced directive? no  Do you have a durable medical power of ? no  ACP document given to the patient? no     Review of Systems:     Review of Systems   Constitutional:  Negative for appetite change, chills, fatigue and  fever.   Respiratory:  Negative for cough and shortness of breath.    Cardiovascular:  Negative for chest pain.   Gastrointestinal:  Positive for rectal pain. Negative for abdominal pain, constipation, diarrhea, nausea and vomiting.   Genitourinary:  Negative for difficulty urinating.   Musculoskeletal:  Positive for arthralgias.      Past Medical History:     Past Medical History:   Diagnosis Date   • Alcohol abuse 1/4/2024   • Bipolar disorder (HCC)    • Diabetes mellitus (HCC)    • Hypertension    • Psychiatric disorder       Past Surgical History:     History reviewed. No pertinent surgical history.   Social History:     Social History     Socioeconomic History   • Marital status: Single     Spouse name: None   • Number of children: None   • Years of education: None   • Highest education level: None   Occupational History   • None   Tobacco Use   • Smoking status: Some Days     Current packs/day: 0.50     Average packs/day: 0.5 packs/day for 30.0 years (15.0 ttl pk-yrs)     Types: Cigarettes   • Smokeless tobacco: None   Vaping Use   • Vaping status: Never Used   Substance and Sexual Activity   • Alcohol use: Yes     Alcohol/week: 40.0 standard drinks of alcohol     Types: 40 Cans of beer per week     Comment: 8-12 beer per day   • Drug use: Not Currently   • Sexual activity: Not Currently   Other Topics Concern   • None   Social History Narrative   • None     Social Determinants of Health     Financial Resource Strain: Not on file   Food Insecurity: No Food Insecurity (7/13/2022)    Received from [facility]    Hunger Vital Sign    • Worried About Running Out of Food in the Last Year: Never true    • Ran Out of Food in the Last Year: Never true   Transportation Needs: Not on file   Physical Activity: Not on file   Stress: Not on file   Social Connections: Not on file   Intimate Partner Violence: Not on file   Housing Stability: Not on file      Family History:     Family History   Problem Relation Age of Onset  "  • Diabetes Mother    • Diabetes Brother       Current Medications:     Current Outpatient Medications   Medication Sig Dispense Refill   • albuterol (ProAir HFA) 90 mcg/act inhaler Inhale 2 puffs every 6 (six) hours as needed for wheezing or shortness of breath 18 g 3   • docusate sodium (COLACE) 100 mg capsule Take 100 mg by mouth 2 (two) times a day     • folic acid (FOLVITE) 1 mg tablet Take 1 tablet (1 mg total) by mouth daily 90 tablet 0   • furosemide (LASIX) 40 mg tablet      • glucose blood (OneTouch Ultra) test strip Use as instructed 100 each 1   • metoclopramide (Reglan) 10 mg tablet Take 1 tablet (10 mg total) by mouth 4 (four) times a day 2 tablet 0   • metoprolol tartrate (LOPRESSOR) 50 mg tablet Take 1 tablet (50 mg total) by mouth every 12 (twelve) hours 180 tablet 1   • mometasone-formoterol (Dulera) 100-5 MCG/ACT inhaler Inhale 2 puffs 2 (two) times a day Rinse mouth after use. 13 g 3   • montelukast (SINGULAIR) 10 mg tablet Take 1 tablet (10 mg total) by mouth daily at bedtime 90 tablet 1   • naltrexone (REVIA) 50 mg tablet Take 1 tablet (50 mg total) by mouth daily 90 tablet 0   • omeprazole (PriLOSEC) 40 MG capsule Take 1 capsule (40 mg total) by mouth daily 90 capsule 1   • potassium chloride (Klor-Con) 10 mEq tablet Take 10 mEq by mouth 2 (two) times a day     • vitamin B-12 (VITAMIN B-12) 1,000 mcg tablet Take by mouth daily       No current facility-administered medications for this visit.      Allergies:     Allergies   Allergen Reactions   • Pollen Extract Eye Swelling      Physical Exam:     /86   Pulse (!) 108   Temp 97.8 °F (36.6 °C)   Resp 18   Ht 5' 9\" (1.753 m)   Wt 104 kg (229 lb 3.2 oz)   SpO2 98%   BMI 33.85 kg/m²     Physical Exam  Vitals reviewed.   Constitutional:       General: She is not in acute distress.     Appearance: She is obese.   HENT:      Head: Normocephalic and atraumatic.      Right Ear: Tympanic membrane, ear canal and external ear normal.      " Left Ear: Tympanic membrane, ear canal and external ear normal.      Mouth/Throat:      Pharynx: No oropharyngeal exudate or posterior oropharyngeal erythema.   Cardiovascular:      Rate and Rhythm: Normal rate and regular rhythm.      Heart sounds: No murmur heard.     No friction rub. No gallop.   Pulmonary:      Effort: Pulmonary effort is normal. No respiratory distress.      Breath sounds: Normal breath sounds. No wheezing, rhonchi or rales.   Abdominal:      General: Abdomen is flat. Bowel sounds are normal. There is no distension.      Palpations: Abdomen is soft. There is no mass.      Tenderness: There is no abdominal tenderness. There is no guarding or rebound.   Musculoskeletal:      Left knee: Crepitus present. No swelling, deformity or ecchymosis. Tenderness present over the medial joint line.   Lymphadenopathy:      Cervical: No cervical adenopathy.   Neurological:      General: No focal deficit present.      Mental Status: She is alert.   Psychiatric:         Mood and Affect: Mood and affect normal.         Speech: Speech normal.         Behavior: Behavior normal.          Sharonda Cochran MD  [facility] PRIMARY CARE

## 2024-02-29 DIAGNOSIS — M17.11 PRIMARY OSTEOARTHRITIS OF RIGHT KNEE: Primary | ICD-10-CM

## 2024-03-04 ENCOUNTER — OFFICE VISIT (OUTPATIENT)
Dept: GASTROENTEROLOGY | Facility: CLINIC | Age: 49
End: 2024-03-04
Payer: COMMERCIAL

## 2024-03-04 VITALS
HEART RATE: 98 BPM | WEIGHT: 238.8 LBS | TEMPERATURE: 97.2 F | OXYGEN SATURATION: 98 % | BODY MASS INDEX: 35.26 KG/M2 | SYSTOLIC BLOOD PRESSURE: 110 MMHG | DIASTOLIC BLOOD PRESSURE: 58 MMHG

## 2024-03-04 DIAGNOSIS — R68.81 EARLY SATIETY: ICD-10-CM

## 2024-03-04 DIAGNOSIS — R13.10 DYSPHAGIA, UNSPECIFIED TYPE: Primary | ICD-10-CM

## 2024-03-04 DIAGNOSIS — F10.20 ALCOHOL USE DISORDER, MODERATE, DEPENDENCE (HCC): ICD-10-CM

## 2024-03-04 DIAGNOSIS — K21.9 GASTROESOPHAGEAL REFLUX DISEASE WITHOUT ESOPHAGITIS: ICD-10-CM

## 2024-03-04 DIAGNOSIS — K76.0 HEPATIC STEATOSIS: ICD-10-CM

## 2024-03-04 DIAGNOSIS — Z12.11 SCREENING FOR COLON CANCER: ICD-10-CM

## 2024-03-04 DIAGNOSIS — R74.01 ELEVATED TRANSAMINASE LEVEL: ICD-10-CM

## 2024-03-04 DIAGNOSIS — R19.5 LOOSE STOOLS: ICD-10-CM

## 2024-03-04 PROCEDURE — 99214 OFFICE O/P EST MOD 30 MIN: CPT | Performed by: PHYSICIAN ASSISTANT

## 2024-03-04 RX ORDER — OMEPRAZOLE 40 MG/1
40 CAPSULE, DELAYED RELEASE ORAL DAILY
Qty: 90 CAPSULE | Refills: 1 | Status: SHIPPED | OUTPATIENT
Start: 2024-03-04

## 2024-03-04 RX ORDER — METOCLOPRAMIDE 5 MG/1
TABLET ORAL
Qty: 2 TABLET | Refills: 0 | Status: SHIPPED | OUTPATIENT
Start: 2024-03-04

## 2024-03-04 NOTE — PROGRESS NOTES
[facility] - Outpatient Follow-up Note  Kathleen Robles 48 y.o. female MRN: [id number]  Encounter: [id number]    ASSESSMENT AND PLAN:      1. Dysphagia, unspecified type  2. Gastroesophageal reflux disease without esophagitis  3. Early satiety    Pt with hx of dysphagia, primarily to solids. EGD was completed in 01/2024, bx for EoE, negative, food in stomach thus procedure incomplete. Also having symptoms of early satiety.     At this time, recommend pt restart/trial PPI daily.   Continue with small frequent meals and diet and lifestyle modifications for GERD.   Repeat EGD with CLD day prior and metoclopramide x 2 per endoscopist recommendation.   If EGD unremarkable, would recommend GES.     If dysphagia persists, consider manometry to additional investigation for esophageal sources of dysphagia. Consider VSS w/ SLP to evaluate for oropharyngeal sources of dysphagia. Edentulous state may be contributing to dysphagia complaints.     Risks associated with endoscopic evaluation discussed with patient today, including but not limited to bleeding, infection, perforation, and organ injury, all of which are low. Pt demonstrates understanding and is agreeable to the plan.     - omeprazole (PriLOSEC) 40 MG capsule; Take 1 capsule (40 mg total) by mouth daily  Dispense: 90 capsule; Refill: 1  - metoclopramide (REGLAN) 5 mg tablet; Take 1 pill at 2PM and 8PM the day prior to the procedure  Dispense: 2 tablet; Refill: 0    4. Loose stools  5. Screening for colon cancer    Pt notes chronically loose stools, including nocturnal awakening to have BM. Hx of etoh abuse, suspect contributing to loose stools. Due/overdue for cancer screening.     At this time, reviewed possible ddx including IBS, IBD, chronic infection, Celiac disease, EPI, other malabsorptive pathology, etc. Recommend serologic and stool testing outlined below.     Continue with etoh cessation.   Pt now agreeable to proceed with a  screening colonoscopy, and we will plan to add on procedure to previously recommended repeat EGD.   Pt requesting procedures in [place] location.   Additional recs pending endoscopic evaluation.   Hold off on anti-diarrheals until stool studies return.     - Calprotectin,Fecal; Future  - IgA; Future  - Tissue transglutaminase, IgA; Future  - Stool Enteric Bacterial Panel by PCR; Future  - Ova and parasite examination; Future  - Giardia antigen; Future  - TSH w/Reflex; Future  - Colonoscopy; Future  - Pancreatic elastase, fecal; Future  - Fecal fat, qualitative; Future  - polyethylene glycol (GOLYTELY) 4000 mL solution; Take 4,000 mL by mouth once for 1 dose  Dispense: 4000 mL; Refill: 0    6. Hepatic steatosis  7. Elevated transaminase level  8. Alcohol use disorder, moderate, dependence (HCC)    Pt with hx of etoh abuse, hx of Hep C, s/p Epclusa. Last viral load 06/2022 undetectable.   Difficulty maintaining sobriety, frequent ER evaluation for etoh intoxication.   Has repeatedly declined detox/inpatient rehab.   Elastography in 02/2024 w/ S1,F0-F1.  Recommend continuing to work on sobriety, encouraged pt to start naltrexone prescribed by PCP.   Monitor LFTs now. May need more comprehensive serologic investigation if LFTs remain elevated.     Discussed recommendations in regards to fatty liver including:   Strict control of contributing comorbidities (obesity, prediabetes/diabetes, hypertension, and hypertriglyceridemia).  Weight loss of approx 10-15% of patient's current body weight over a period of 6-12 months through low fat diet and cardiovascular exercise as tolerated.  Limiting alcohol consumption, preferably complete abstinence.  Monitor hepatic function every 6 months with routine labs.     We will follow up after bidirectional endoscopic evaluation.   ______________________________________________________________________    SUBJECTIVE: Patient is a 48 y.o. female who presents today for  follow-up regarding dysphagia. Pmhx sig for DM2, etoh use disorder, hx of HCV s/p Epculsa with SVR, bipolar disease, HTN.     Pt was last evaluated in clinic in 12/2023. At that time, she was complaining of dysphagia to solids and liquids. She underwent EGD though this demonstrated food in stomach thus procedure was aborted. Esophageal bx negative for EoE. Repeat EGD was recommended with use of reglan prior.     03/04/24:     Pt shares that she continues to have issues with dysphagia, primarily to solids at this time. Feels as though contents are getting stuck in throat, can regurgitate at times. No sig heartburn/indigestion. No nausea/emesis. She does endorse symptoms of early satiety, no abnormal weight loss since last OV.     Pertaining to BM, typically tends toward loose stools. Notes upwards of 5 soft urgent BM daily. Notes burning with defecation. No BRBPR or melena. Notes intermittent nocturnal BM. Historically declined investigation for this or colon cancer screening. No family hx of CRC.    Since last OV,  pt has been in ER x 2 for etoh intoxication. She shares she has been sober for 1.5 weeks. PCP prescribed naltrexone, pt has not started taking.     Etoh: 6 pack per day; sober approx 1.5 weeks (03/04/24)  Tobacco: 1-2 cigarettes daily  NSAIDs: none      10/2023: CT soft tissue neck: wnl  CXR: 10/2023: wnl   12/2023: BUN 9, Cr 0.87, AST 23, ALT 14, , albumin 3.8, t bili 0.26, Hb 11.4, MCV 92, Plt 354   01/2024: CT A/P: No acute inflammatory process. Left-sided colonic diverticulosis   with no evidence of diverticulitis.   02/2023: Hb 12.0, MCV 90, platelets 232, BUN 8, creatinine 1.0, AST 46, ALT 25, , albumin 4.3, T. bili 0.69, folate 7.6, B12 277, ferritin 22, TSAT 10, iron 41, TIBC 398  02/2024: Elastography: S1, F0-F1     Endoscopic history:   EGD: 01/2024: Ectopic gastric mucosa in the upper third of the esophagus; large amt of food in stomach  A. Esophagus, distal esophagus, r/o EOE:  Squamous mucosa with no significant histopathologic abnormalities; No increase in eosinophils  B. Esophagus, proximal esophagus, r/o EOE: Squamous mucosa with no significant histopathologic abnormalities; No increase in eosinophils  EGD: 06/2022: mild gastritis, food in stomach   A. duodenum, biopsy: Small bowel mucosa with no significant pathologic changes; Negative for celiac disease   B. stomach, biopsy: Chronic focally active gastritis with scattered erosions; Immunostain for Helicobacter negative  Colon: unknown     Review of Systems   Constitutional:  Negative for fever and unexpected weight change.   HENT:  Positive for dental problem and trouble swallowing. Negative for mouth sores.    Gastrointestinal:  Positive for diarrhea and nausea. Negative for abdominal pain, blood in stool and vomiting.   Endocrine: Negative.    Genitourinary: Negative.    Skin:  Negative for rash.   Neurological:  Negative for seizures and syncope.   Hematological: Negative.    Otherwise Per HPI    Historical Information   Past Medical History:   Diagnosis Date    Alcohol abuse 1/4/2024    Bipolar disorder (HCC)     Diabetes mellitus (HCC)     Hypertension     Psychiatric disorder      History reviewed. No pertinent surgical history.  Social History   Social History     Substance and Sexual Activity   Alcohol Use Yes    Alcohol/week: 40.0 standard drinks of alcohol    Types: 40 Cans of beer per week    Comment: 8-12 beer per day     Social History     Substance and Sexual Activity   Drug Use Not Currently     Social History     Tobacco Use   Smoking Status Some Days    Current packs/day: 0.50    Average packs/day: 0.5 packs/day for 30.0 years (15.0 ttl pk-yrs)    Types: Cigarettes   Smokeless Tobacco Not on file     Family History   Problem Relation Age of Onset    Diabetes Mother     Diabetes Brother      Meds/Allergies       Current Outpatient Medications:     albuterol (ProAir HFA) 90 mcg/act inhaler    folic acid (FOLVITE) 1 mg  tablet    furosemide (LASIX) 40 mg tablet    glucose blood (OneTouch Ultra) test strip    metoprolol tartrate (LOPRESSOR) 50 mg tablet    mometasone-formoterol (Dulera) 100-5 MCG/ACT inhaler    montelukast (SINGULAIR) 10 mg tablet    naltrexone (REVIA) 50 mg tablet    omeprazole (PriLOSEC) 40 MG capsule    potassium chloride (Klor-Con) 10 mEq tablet    vitamin B-12 (VITAMIN B-12) 1,000 mcg tablet    docusate sodium (COLACE) 100 mg capsule    metoclopramide (Reglan) 10 mg tablet    Allergies   Allergen Reactions    Pollen Extract Eye Swelling     Objective     Blood pressure 110/58, pulse 98, temperature (!) 97.2 °F (36.2 °C), weight 108 kg (238 lb 12.8 oz), SpO2 98%. Body mass index is 35.26 kg/m².    Physical Exam  Vitals and nursing note reviewed.   Constitutional:       General: She is not in acute distress.     Appearance: She is well-developed. She is obese.   HENT:      Head: Normocephalic and atraumatic.      Mouth/Throat:      Comments: Edentulous   Eyes:      General: No scleral icterus.     Conjunctiva/sclera: Conjunctivae normal.   Cardiovascular:      Rate and Rhythm: Normal rate.   Pulmonary:      Effort: Pulmonary effort is normal. No respiratory distress.   Abdominal:      General: Bowel sounds are normal. There is no distension.      Palpations: Abdomen is soft.      Tenderness: There is no abdominal tenderness. There is no guarding or rebound.   Musculoskeletal:         General: No swelling.   Skin:     General: Skin is warm and dry.      Coloration: Skin is not jaundiced.   Neurological:      General: No focal deficit present.      Mental Status: She is alert. Mental status is at baseline.   Psychiatric:         Mood and Affect: Mood normal.         Behavior: Behavior normal.       Lab Results:   No visits with results within 1 Day(s) from this visit.   Latest known visit with results is:   Appointment on 02/28/2024   Component Date Value    WBC 02/28/2024 7.98     RBC 02/28/2024 4.28      Hemoglobin 02/28/2024 12.0     Hematocrit 02/28/2024 38.3     MCV 02/28/2024 90     MCH 02/28/2024 28.0     MCHC 02/28/2024 31.3 (L)     RDW 02/28/2024 21.0 (H)     MPV 02/28/2024 10.4     Platelets 02/28/2024 232     nRBC 02/28/2024 0     Neutrophils Relative 02/28/2024 72     Immat GRANS % 02/28/2024 0     Lymphocytes Relative 02/28/2024 19     Monocytes Relative 02/28/2024 7     Eosinophils Relative 02/28/2024 1     Basophils Relative 02/28/2024 1     Neutrophils Absolute 02/28/2024 5.62     Immature Grans Absolute 02/28/2024 0.03     Lymphocytes Absolute 02/28/2024 1.53     Monocytes Absolute 02/28/2024 0.58     Eosinophils Absolute 02/28/2024 0.11     Basophils Absolute 02/28/2024 0.11 (H)     Sodium 02/28/2024 140     Potassium 02/28/2024 4.3     Chloride 02/28/2024 106     CO2 02/28/2024 23     ANION GAP 02/28/2024 11     BUN 02/28/2024 8     Creatinine 02/28/2024 1.00     Glucose 02/28/2024 127     Calcium 02/28/2024 10.1     AST 02/28/2024 46 (H)     ALT 02/28/2024 25     Alkaline Phosphatase 02/28/2024 121 (H)     Total Protein 02/28/2024 7.0     Albumin 02/28/2024 4.3     Total Bilirubin 02/28/2024 0.69     eGFR 02/28/2024 65     Folate 02/28/2024 7.6     Vitamin B-12 02/28/2024 277     Iron Saturation 02/28/2024 10 (L)     TIBC 02/28/2024 398     Iron 02/28/2024 41 (L)     UIBC 02/28/2024 357 (H)     Ferritin 02/28/2024 22        Radiology Results:   XR knee 3 vw right non injury    Result Date: 2/28/2024  Narrative: RIGHT KNEE INDICATION:   Pain in right knee. Other chronic pain.   COMPARISON:  None. VIEWS:  XR KNEE 3 VW RIGHT NON INJURY Images: 3 FINDINGS: There is no acute fracture or dislocation. There is a moderate-sized joint effusion. There is joint space narrowing with osteophytosis in all 3 compartments worse laterally.. No lytic or blastic osseous lesion. Soft tissues are unremarkable.     Impression: Mild to moderate osteoarthritis worse in the lateral compartment. Moderate-sized suprapatellar  joint effusion Electronically signed: 02/28/2024 02:50 PM Christiano Mike MD    US elastography/UGAP    Result Date: 2/17/2024  Narrative: ELASTOGRAPHY, LIVER ULTRASOUND INDICATION: K76.0: Fatty (change of) liver, not elsewhere classified. COMPARISON: Abdomen ultrasound 12/30/2023 TECHNIQUE: Targeted ultrasound of the liver was performed with a curvilinear transducer on a [employer] e10 utilizing 2D SWE. A total of 10 shear wave Elastography samples were obtained. FINDINGS: Shear Wave Elastography sampling was performed to evaluate stiffness changes within the liver associated with fibrosis. E1 8.19 kPa E2 7.57 kPa E3 7.97 kPa E4 8.15 kPa E5 6.87 kPa E6 6.32 kPa E7 7.1 kPa E8 8.32 kPa E9 6.56 kPa E10 6.42 kPa E median: 7.34 kPa. The corresponding Metavir score is F0-F1 (absent or mild fibrosis), range 0-8.26 kPa. < 1.66 m/s. IQR/median: 20% (IQR of less than 30% is considered a satisfactory data set). Note: The stage of liver fibrosis may be overestimated by clinical conditions unrelated to fibrosis, including but not limited to, nonfasting, hepatic inflammation, vascular congestion, biliary obstruction, and infiltrative liver disease. Furthermore, in  some patients with NAFLD, the cut-off values for compensated advanced chronic liver disease may be lower (7-9 kPa). In causes other than viral hepatitis and nonalcoholic fatty liver disease, the cut-off values are not well established. When comparing measurements across time, a clinically significant change should be considered when the delta change is greater than 10%. In all these conditions, however, liver stiffness values within the normal range exclude significant liver fibrosis. Ultrasound-guided attenuation parameter (UGAP) Liver Steatosis Grading A1 0.71 dB/cm/MHz A2 0.72 dB/cm/MHz A3 0.7 dB/cm/MHz A4 0.73 dB/cm/MHz A5 0.67 dB/cm/MHz A6 0.7 dB/cm/MHz A7 0.7 dB/cm/MHz A8 0.71 dB/cm/MHz A9 0.7 dB/cm/MHz A10 0.73 dB/cm/MHz Attenuation coefficient: 0.7 dB/cm/MHz Liver  steatosis grading: S1 (5%-33% steatosis) range 0.65-0.70 dB/cm/MHz IQR/Med: 3% (Less than or equal to 30% is a satisfactory data set). Reference White paper for GE ultrasound-guided attenuation parameter [email or web address]     Impression: Metavir score: F0 - F1, Absent or Mild Fibrosis According to the updated SRU consensus statement, a liver stiffness of 7.34 kPa, which in the absence of other known clinical signs*, rules out compensated advanced chronic liver disease (cACLD). If there are known clinical signs, may need further test for confirmation. https://pubs.rsna.org/doi/10.1148/radiol.5490760202 Liver steatosis grading: S1 ( 5%-33% steatosis) Workstation performed: [id number]     CT spine cervical wo contrast    Result Date: 2/15/2024  Narrative: CT head without contrast CT cervical spine without contrast History: Head trauma, neck trauma Technique: Unenhanced axial images of the head and cervical spine were obtained from the thoracic inlet to the vertex, with sagittal and coronal reformatted images. CT examination performed with dose lowering protocol in accordance with ALARA. Comparison: CT head dated 6/8/2022, CT cervical spine dated 4/13/2018 2016 Findings: CT head: Brain Parenchyma: There is no evidence of acute intracranial hemorrhage. No definite acute transcortical infarct is identified. Ventricular system, basal cisterns and extra-axial spaces: Unremarkable for the patient's age. Intracranial hemorrhage: None. Midline shift: None. Skull base & Calvarium: No definite acute pathology. Paranasal sinuses and mastoid air cells: Clear. Visualized orbits: Grossly intact. Sella: Suboptimally evaluated on this modality. CT Cervical Spine: There is straightening of the expected cervical lordosis. The prevertebral soft tissue is within normal limits. The craniocervical junction is intact. Vertebral body heights are maintained. There is no  evidence of acute fracture. There are no jumped or perched facets. There is multilevel degenerative change in the cervical spine.      Impression: Impression: 1. No evidence of acute intracranial abnormality. 2. No evidence of acute cervical spine fracture. Please continue to follow the spine trauma protocol. Workstation:[id number]    CT head wo contrast    Result Date: 2/15/2024  Narrative: CT head without contrast CT cervical spine without contrast History: Head trauma, neck trauma Technique: Unenhanced axial images of the head and cervical spine were obtained from the thoracic inlet to the vertex, with sagittal and coronal reformatted images. CT examination performed with dose lowering protocol in accordance with ALARA. Comparison: CT head dated 6/8/2022, CT cervical spine dated 4/13/2018 2016 Findings: CT head: Brain Parenchyma: There is no evidence of acute intracranial hemorrhage. No definite acute transcortical infarct is identified. Ventricular system, basal cisterns and extra-axial spaces: Unremarkable for the patient's age. Intracranial hemorrhage: None. Midline shift: None. Skull base & Calvarium: No definite acute pathology. Paranasal sinuses and mastoid air cells: Clear. Visualized orbits: Grossly intact. Sella: Suboptimally evaluated on this modality. CT Cervical Spine: There is straightening of the expected cervical lordosis. The prevertebral soft tissue is within normal limits. The craniocervical junction is intact. Vertebral body heights are maintained. There is no evidence of acute fracture. There are no jumped or perched facets. There is multilevel degenerative change in the cervical spine.      Impression: Impression: 1. No evidence of acute intracranial abnormality. 2. No evidence of acute cervical spine fracture. Please continue to follow the spine trauma protocol. Workstation:[id number]      Coty Ocasio PA-C    **Please note:  Dictation voice to text software may have been used in the creation of  this record.  Occasional wrong word or “sound alike” substitutions may have occurred due to the inherent limitations of voice recognition software.  Read the chart carefully and recognize, using context, where substitutions have occurred.**

## 2024-03-04 NOTE — PATIENT INSTRUCTIONS
Stay on the omeprazole every morning, this is a pill to help protect your stomach and your food pipe.  When you are eating, eat only small amounts at a time scattered throughout the day.  Chew very thoroughly as best you can, take small sips of liquid in between swallowing solids to let things go down better.  We will perform the upper endoscopy and add on a colonoscopy to be done while you are asleep.  The day before the procedure you are on a clear liquid diet and you have to drink this big jug of laxative.  Because you had food in your stomach last time, the doctor would like you to take 1 pill at 2 PM and 1 pill at 8 PM the day before the colonoscopy to help empty out your stomach.    The best thing that you can do to help your liver health and overall health is to remain alcohol free.  Keep up the good work with avoiding alcohol.  Start on the naltrexone that the doctor gave you, to help curb your craving to drink.  If you feel like you want to go to some type of program to better help you remain alcohol free, reach out to us or your primary care.  If you start to have any severe tremors or hallucinations, get to the ER immediately as there can be life-threatening alcohol withdrawal.    For your loose stool, I think part of it is the alcohol intake historically that caused some urgent loose stool.  I am however checking blood work and stool testing for infection and inflammation.

## 2024-04-05 ENCOUNTER — OFFICE VISIT (OUTPATIENT)
Dept: OBGYN CLINIC | Facility: CLINIC | Age: 49
End: 2024-04-05
Payer: COMMERCIAL

## 2024-04-05 VITALS
BODY MASS INDEX: 35.26 KG/M2 | SYSTOLIC BLOOD PRESSURE: 111 MMHG | DIASTOLIC BLOOD PRESSURE: 80 MMHG | HEIGHT: 69 IN | HEART RATE: 80 BPM

## 2024-04-05 DIAGNOSIS — M25.562 LEFT KNEE PAIN, UNSPECIFIED CHRONICITY: Primary | ICD-10-CM

## 2024-04-05 DIAGNOSIS — M17.12 PRIMARY OSTEOARTHRITIS OF LEFT KNEE: ICD-10-CM

## 2024-04-05 PROCEDURE — 99204 OFFICE O/P NEW MOD 45 MIN: CPT | Performed by: STUDENT IN AN ORGANIZED HEALTH CARE EDUCATION/TRAINING PROGRAM

## 2024-04-05 PROCEDURE — 20610 DRAIN/INJ JOINT/BURSA W/O US: CPT | Performed by: PHYSICIAN ASSISTANT

## 2024-04-05 RX ADMIN — METHYLPREDNISOLONE ACETATE 1 ML: 40 INJECTION, SUSPENSION INTRA-ARTICULAR; INTRALESIONAL; INTRAMUSCULAR; SOFT TISSUE at 14:30

## 2024-04-05 RX ADMIN — BUPIVACAINE HYDROCHLORIDE 4 ML: 2.5 INJECTION, SOLUTION INFILTRATION; PERINEURAL at 14:30

## 2024-04-05 RX ADMIN — LIDOCAINE HYDROCHLORIDE 4 ML: 20 INJECTION, SOLUTION INFILTRATION; PERINEURAL at 14:30

## 2024-04-05 NOTE — PROGRESS NOTES
Ortho Sports Medicine Knee New Patient Visit     Assesment:   48 y.o. female with chronic bilateral knee pain. Imaging and exam consistent with osteoarthritis.     Plan:  I reviewed the history, exam, and imaging with the patient in clinic today.  I discussed with the patient that their bilateral knee pain was due to osteoarthritis as noted on imaging and consistent with the exam in clinic today.  I discussed the natural history of osteoarthritis.  I discussed conservative treatment options including activity modification, weight loss, oral pain medications including Tylenol and anti-inflammatories, physical therapy, and injections.  We also discussed surgical intervention.  After a long discussion with the patient, the patient opted for a corticosteroid injection.  I discussed the risks and benefits of a corticosteroid injection, including risk of a flare reaction, infection, and increase in blood sugar particularly in the setting of diabetes.  After discussing the risks, the patient was still amenable to the injection.  I performed a corticosteroid injection into the right knee.  The patient tolerated the procedure well with no adverse reaction.  The patient was distally neurovascular intact after the injection. The patient demonstrated understanding of our discussion and was in agreement with the plan.  All of their questions were answered.  The patient can follow-up for injection into the left knee if she gets symptomatic relief from the injection.  In the meantime, the patient can reach out to the clinic with any further questions or concerns at any time      Conservative treatment:  Ice to knee for 20 minutes at least 1-2 times daily.  Let pain guide activities.    Imaging:  All imaging from today was reviewed by myself and explained to the patient.     Injection:  The risks and benefits of the injection (which include but are not limited to: infection, bleeding,damage to nerve/artery, need for further  intervention), as well as the risks and benefits of all alternative treatments were explained and understood.  The patient elected to proceed with injection.  The procedure was done with aseptic technique, and the patient tolerated the procedure well with no complications.  A corticosteroid injection was performed.  The patient should take 1-2 days off of activity, with gradual return to activity as tolerated.  Ice to the knee 1-2 times daily for 20 minutes, for next 24-48 hrs.  May consider future corticosteroid injection depending on clinical exam/imaging. In about 1 week for the left knee pending healing of the abrasion.    Large joint arthrocentesis: R knee  Universal Protocol:  Consent: Verbal consent obtained.  Consent given by: patient  Patient understanding: patient states understanding of the procedure being performed  Site marked: the operative site was marked  Radiology Images displayed and confirmed. If images not available, report reviewed: imaging studies available  Patient identity confirmed: verbally with patient  Supporting Documentation  Indications: pain   Procedure Details  Location: knee - R knee  Needle gauge: 21 G.  Ultrasound guidance: no  Approach: lateral  Medications administered: 1 mL methylPREDNISolone acetate 40 mg/mL; 4 mL lidocaine 2 %; 4 mL bupivacaine 0.25 %    Patient tolerance: patient tolerated the procedure well with no immediate complications  Dressing:  Sterile dressing applied        Surgery:   No surgery is recommended at this point, continue with conservative treatment plan as noted.    Follow up:  Return if symptoms worsen or fail to improve.        Chief Complaint   Patient presents with    Left Knee - Pain    Right Knee - Pain       History of Present Illness:  The patient is a 48 y.o. female seen in clinic for bilateral knee pain. The pain started years ago. The patient states she feel recently onto the left knee. The pain is now located diffusely in bilateral knees and  is associated with swelling on the left more so than the right. The patient characterizes the intensity of pain as a 10 out of 10 at worst. Symptoms are aggravated by stairs. The patient has tried assistive devices for ambulation. Symptoms have not improved with time. Patient has a history of no prior injury, or surgery.    Occupation: not employed    The patient has the following co-morbidities: DM, HTN      Knee Surgical History:  None    Past Medical, Social and Family History:  Past Medical History:   Diagnosis Date    Alcohol abuse 1/4/2024    Bipolar disorder (HCC)     Diabetes mellitus (HCC)     Hypertension     Psychiatric disorder      History reviewed. No pertinent surgical history.  Allergies   Allergen Reactions    Pollen Extract Eye Swelling     Current Outpatient Medications on File Prior to Visit   Medication Sig Dispense Refill    albuterol (ProAir HFA) 90 mcg/act inhaler Inhale 2 puffs every 6 (six) hours as needed for wheezing or shortness of breath 18 g 3    folic acid (FOLVITE) 1 mg tablet Take 1 tablet (1 mg total) by mouth daily 90 tablet 0    furosemide (LASIX) 40 mg tablet       glucose blood (OneTouch Ultra) test strip Use as instructed 100 each 1    metoclopramide (REGLAN) 5 mg tablet Take 1 pill at 2PM and 8PM the day prior to the procedure 2 tablet 0    metoprolol tartrate (LOPRESSOR) 50 mg tablet Take 1 tablet (50 mg total) by mouth every 12 (twelve) hours 180 tablet 1    mometasone-formoterol (Dulera) 100-5 MCG/ACT inhaler Inhale 2 puffs 2 (two) times a day Rinse mouth after use. 13 g 3    montelukast (SINGULAIR) 10 mg tablet Take 1 tablet (10 mg total) by mouth daily at bedtime 90 tablet 1    naltrexone (REVIA) 50 mg tablet Take 1 tablet (50 mg total) by mouth daily 90 tablet 0    omeprazole (PriLOSEC) 40 MG capsule Take 1 capsule (40 mg total) by mouth daily 90 capsule 1    potassium chloride (Klor-Con) 10 mEq tablet Take 10 mEq by mouth 2 (two) times a day      vitamin B-12 (VITAMIN  "B-12) 1,000 mcg tablet Take by mouth daily      polyethylene glycol (GOLYTELY) 4000 mL solution Take 4,000 mL by mouth once for 1 dose 4000 mL 0     No current facility-administered medications on file prior to visit.     Social History     Socioeconomic History    Marital status: Single     Spouse name: Not on file    Number of children: Not on file    Years of education: Not on file    Highest education level: Not on file   Occupational History    Not on file   Tobacco Use    Smoking status: Some Days     Current packs/day: 0.50     Average packs/day: 0.5 packs/day for 30.0 years (15.0 ttl pk-yrs)     Types: Cigarettes    Smokeless tobacco: Not on file   Vaping Use    Vaping status: Never Used   Substance and Sexual Activity    Alcohol use: Yes     Alcohol/week: 40.0 standard drinks of alcohol     Types: 40 Cans of beer per week     Comment: 8-12 beer per day    Drug use: Not Currently    Sexual activity: Not Currently   Other Topics Concern    Not on file   Social History Narrative    Not on file     Social Determinants of Health     Financial Resource Strain: Not on file   Food Insecurity: No Food Insecurity (7/13/2022)    Received from [facility]    Hunger Vital Sign     Worried About Running Out of Food in the Last Year: Never true     Ran Out of Food in the Last Year: Never true   Transportation Needs: Not on file   Physical Activity: Not on file   Stress: Not on file   Social Connections: Not on file   Intimate Partner Violence: Not on file   Housing Stability: Not on file         I have reviewed the past medical, surgical, social and family history, medications and allergies as documented in the EMR.    Review of systems: ROS is negative other than that noted in the HPI.    Psychiatric/Behavioral: Negative for agitation.      Physical Exam:    Blood pressure 111/80, pulse 80, height 5' 9\" (1.753 m).    General/Constitutional: NAD, well developed, well nourished  HENT: Normocephalic, atraumatic  CV: Intact " distal pulses, regular rate  Resp: No respiratory distress or labored breathing  Neuro: Alert and Oriented x 3  Psych: Normal mood, normal affect, normal judgement, normal behavior  Skin: Warm, dry, no rashes, no erythema    Focused bilateral knee exam:  Ambulates with a mildly antalgic gait.    Skin is intact on the right knee with a superficial abrasion on the left anterior knee. No evidence of ecchymosis, erythema, gross deformity or previous surgical incisions. mild effusion on the left.     Palpation of the knee demonstrates diffuse tenderness over the medial patellar facet, lateral patellar facet, tibial tubercle or patellar tendon, pes anserine bursa, Gerdy's tubercle and the medial and lateral epicondyle.  There is no tenderness in the popliteal fossa.  There is no tenderness with palpation over the posterior calf without palpable cords.    Range of motion testing demonstrates that the patient is able to achieve 0 degrees of extension and 120 degrees of flexion. There is no pain with hyperflexion or hyperextension.  There is negative patellar crepitus. The patient is able to do a straight leg raise.      Strength testing demonstrates 5/5 strength with hip flexion, 5/5 knee extension, 5/5 knee flexion, and 5/5 dorsiflexion and plantarflexion.    Provocation testing demonstrates  Mensicus- negative medial joint line tenderness, negative lateral joint line tenderness, negative Maurice's, negative flexion compression.  Collateral ligaments- negative varus laxity at full extension and in 30° of knee flexion, negative valgus laxity at full extension and in 30° of knee flexion.  Cruciate ligament- 1A Lachman examination, negative pivot shift test, 1A anterior drawer, 1A posterior drawer, negative posterior sag.  Patella- negative J-sign, negative patellar grind test, negative tight lateral patellar tilt.    Range of motion testing of the hip and ankle are within normal limits.    No calf tenderness to palpation  bilaterally    LE NV Exam: +2 DP/PT pulses bilaterally  Sensation intact to light touch L2-S1 bilaterally, SPN/DPN/TA motor intact     Knee Imaging    X-rays of the bilateral knee were obtained on 2/28/24 and 4/5/24 and reviewed with the patient. Per my independent review, the imaging shows moderate tricompartmental osteoarthritis.      Scribe Attestation      I,:  Johnie Harmon PA-C am acting as a scribe while in the presence of the attending physician.:       I,:  Vega Bowman MD personally performed the services described in this documentation    as scribed in my presence.:

## 2024-04-07 RX ORDER — BUPIVACAINE HYDROCHLORIDE 2.5 MG/ML
4 INJECTION, SOLUTION INFILTRATION; PERINEURAL
Status: COMPLETED | OUTPATIENT
Start: 2024-04-05 | End: 2024-04-05

## 2024-04-07 RX ORDER — METHYLPREDNISOLONE ACETATE 40 MG/ML
1 INJECTION, SUSPENSION INTRA-ARTICULAR; INTRALESIONAL; INTRAMUSCULAR; SOFT TISSUE
Status: COMPLETED | OUTPATIENT
Start: 2024-04-05 | End: 2024-04-05

## 2024-04-07 RX ORDER — LIDOCAINE HYDROCHLORIDE 20 MG/ML
4 INJECTION, SOLUTION INFILTRATION; PERINEURAL
Status: COMPLETED | OUTPATIENT
Start: 2024-04-05 | End: 2024-04-05

## 2024-04-26 ENCOUNTER — TELEPHONE (OUTPATIENT)
Dept: FAMILY MEDICINE CLINIC | Facility: CLINIC | Age: 49
End: 2024-04-26

## 2024-04-26 NOTE — TELEPHONE ENCOUNTER
Patient called the office today because she was having issues relating to congestion, coughing/wheezing, as well as runny nose and eyes watering. Patient also reports having asthma. Speaking with patient, she reports that she was not having any trouble breathing but was having trouble staying asleep at night. Blowing her nose constantly and congestion making things uncomfortable. Patient reported taking Dayquil a short time ago, but as of the moment of the call it had not helped at all. Informed patient that there were no more appointments here today, and that if she was concerned we would recommend going to an urgent care in order to be seen. Patient expressed understanding and reports that someone will bring her to an urgent care in the next few hours.

## 2024-05-15 ENCOUNTER — TELEPHONE (OUTPATIENT)
Dept: GASTROENTEROLOGY | Facility: MEDICAL CENTER | Age: 49
End: 2024-05-15

## 2024-05-15 NOTE — TELEPHONE ENCOUNTER
Left voicemail and requested call back   Confirming Upcoming Procedure: colon/egd on May 23rd  Physician performing: Dr. Schwartz  Location of procedure:  [facility]  Prep: Golytely

## 2024-05-21 RX ORDER — SODIUM CHLORIDE 9 MG/ML
125 INJECTION, SOLUTION INTRAVENOUS CONTINUOUS
OUTPATIENT
Start: 2024-05-21

## 2024-05-23 DIAGNOSIS — D64.9 ANEMIA, UNSPECIFIED TYPE: ICD-10-CM

## 2024-05-23 RX ORDER — FOLIC ACID 1 MG/1
1 TABLET ORAL DAILY
Qty: 90 TABLET | Refills: 0 | Status: SHIPPED | OUTPATIENT
Start: 2024-05-23

## 2024-06-05 ENCOUNTER — TELEPHONE (OUTPATIENT)
Dept: FAMILY MEDICINE CLINIC | Facility: CLINIC | Age: 49
End: 2024-06-05

## 2024-06-05 DIAGNOSIS — B37.89 CANDIDIASIS OF BREAST: Primary | ICD-10-CM

## 2024-06-05 RX ORDER — NYSTATIN 100000 U/G
CREAM TOPICAL 2 TIMES DAILY
Qty: 30 G | Refills: 1 | Status: SHIPPED | OUTPATIENT
Start: 2024-06-05

## 2024-06-05 NOTE — TELEPHONE ENCOUNTER
PT would like a medication for the rash under her breast area. She uses the [facility] in [place].

## 2024-06-13 ENCOUNTER — HOSPITAL ENCOUNTER (EMERGENCY)
Facility: HOSPITAL | Age: 49
Discharge: HOME | End: 2024-06-14
Attending: EMERGENCY MEDICINE
Payer: COMMERCIAL

## 2024-06-13 VITALS
RESPIRATION RATE: 18 BRPM | SYSTOLIC BLOOD PRESSURE: 115 MMHG | OXYGEN SATURATION: 96 % | HEART RATE: 91 BPM | DIASTOLIC BLOOD PRESSURE: 80 MMHG | TEMPERATURE: 97.6 F

## 2024-06-13 DIAGNOSIS — E87.6 HYPOKALEMIA: ICD-10-CM

## 2024-06-13 DIAGNOSIS — F10.929 ALCOHOL INTOXICATION (HCC): ICD-10-CM

## 2024-06-13 DIAGNOSIS — R45.851 SUICIDAL IDEATION: ICD-10-CM

## 2024-06-13 DIAGNOSIS — R06.00 DYSPNEA: Primary | ICD-10-CM

## 2024-06-13 LAB
ALBUMIN SERPL BCP-MCNC: 3.8 G/DL (ref 3.5–5)
ALP SERPL-CCNC: 122 U/L (ref 34–104)
ALT SERPL W P-5'-P-CCNC: 19 U/L (ref 7–52)
AMPHETAMINES SERPL QL SCN: NEGATIVE
ANION GAP SERPL CALCULATED.3IONS-SCNC: 10 MMOL/L (ref 4–13)
AST SERPL W P-5'-P-CCNC: 33 U/L (ref 13–39)
ATRIAL RATE: 88 BPM
BARBITURATES UR QL: NEGATIVE
BASOPHILS # BLD AUTO: 0.12 THOUSANDS/ÂΜL (ref 0–0.1)
BASOPHILS NFR BLD AUTO: 2 % (ref 0–1)
BENZODIAZ UR QL: NEGATIVE
BILIRUB SERPL-MCNC: 0.27 MG/DL (ref 0.2–1)
BUN SERPL-MCNC: 6 MG/DL (ref 5–25)
CALCIUM SERPL-MCNC: 8.3 MG/DL (ref 8.4–10.2)
CARDIAC TROPONIN I PNL SERPL HS: 3 NG/L
CHLORIDE SERPL-SCNC: 95 MMOL/L (ref 96–108)
CO2 SERPL-SCNC: 27 MMOL/L (ref 21–32)
COCAINE UR QL: NEGATIVE
CREAT SERPL-MCNC: 0.86 MG/DL (ref 0.6–1.3)
EOSINOPHIL # BLD AUTO: 0.29 THOUSAND/ÂΜL (ref 0–0.61)
EOSINOPHIL NFR BLD AUTO: 5 % (ref 0–6)
ERYTHROCYTE [DISTWIDTH] IN BLOOD BY AUTOMATED COUNT: 17.5 % (ref 11.6–15.1)
ETHANOL SERPL-MCNC: 325 MG/DL
FENTANYL UR QL SCN: NEGATIVE
GFR SERPL CREATININE-BSD FRML MDRD: 79 ML/MIN/1.73SQ M
GLUCOSE SERPL-MCNC: 123 MG/DL (ref 65–140)
HCT VFR BLD AUTO: 34.7 % (ref 34.8–46.1)
HGB BLD-MCNC: 11 G/DL (ref 11.5–15.4)
HYDROCODONE UR QL SCN: NEGATIVE
IMM GRANULOCYTES # BLD AUTO: 0.01 THOUSAND/UL (ref 0–0.2)
IMM GRANULOCYTES NFR BLD AUTO: 0 % (ref 0–2)
LYMPHOCYTES # BLD AUTO: 2.33 THOUSANDS/ÂΜL (ref 0.6–4.47)
LYMPHOCYTES NFR BLD AUTO: 41 % (ref 14–44)
MCH RBC QN AUTO: 27.8 PG (ref 26.8–34.3)
MCHC RBC AUTO-ENTMCNC: 31.7 G/DL (ref 31.4–37.4)
MCV RBC AUTO: 88 FL (ref 82–98)
METHADONE UR QL: NEGATIVE
MONOCYTES # BLD AUTO: 0.52 THOUSAND/ÂΜL (ref 0.17–1.22)
MONOCYTES NFR BLD AUTO: 9 % (ref 4–12)
NEUTROPHILS # BLD AUTO: 2.4 THOUSANDS/ÂΜL (ref 1.85–7.62)
NEUTS SEG NFR BLD AUTO: 43 % (ref 43–75)
NRBC BLD AUTO-RTO: 0 /100 WBCS
OPIATES UR QL SCN: NEGATIVE
OXYCODONE+OXYMORPHONE UR QL SCN: NEGATIVE
P AXIS: 72 DEGREES
PCP UR QL: NEGATIVE
PLATELET # BLD AUTO: 252 THOUSANDS/UL (ref 149–390)
PMV BLD AUTO: 9.8 FL (ref 8.9–12.7)
POTASSIUM SERPL-SCNC: 3 MMOL/L (ref 3.5–5.3)
PR INTERVAL: 164 MS
PROT SERPL-MCNC: 6.7 G/DL (ref 6.4–8.4)
QRS AXIS: 56 DEGREES
QRSD INTERVAL: 92 MS
QT INTERVAL: 398 MS
QTC INTERVAL: 481 MS
RBC # BLD AUTO: 3.95 MILLION/UL (ref 3.81–5.12)
SODIUM SERPL-SCNC: 132 MMOL/L (ref 135–147)
T WAVE AXIS: 49 DEGREES
THC UR QL: NEGATIVE
TSH SERPL DL<=0.05 MIU/L-ACNC: 0.36 UIU/ML (ref 0.45–4.5)
VENTRICULAR RATE: 88 BPM
WBC # BLD AUTO: 5.67 THOUSAND/UL (ref 4.31–10.16)

## 2024-06-13 PROCEDURE — 99285 EMERGENCY DEPT VISIT HI MDM: CPT | Performed by: EMERGENCY MEDICINE

## 2024-06-13 PROCEDURE — 96366 THER/PROPH/DIAG IV INF ADDON: CPT

## 2024-06-13 PROCEDURE — 84484 ASSAY OF TROPONIN QUANT: CPT | Performed by: EMERGENCY MEDICINE

## 2024-06-13 PROCEDURE — 96367 TX/PROPH/DG ADDL SEQ IV INF: CPT

## 2024-06-13 PROCEDURE — 82077 ASSAY SPEC XCP UR&BREATH IA: CPT | Performed by: EMERGENCY MEDICINE

## 2024-06-13 PROCEDURE — 36415 COLL VENOUS BLD VENIPUNCTURE: CPT | Performed by: EMERGENCY MEDICINE

## 2024-06-13 PROCEDURE — 93005 ELECTROCARDIOGRAM TRACING: CPT

## 2024-06-13 PROCEDURE — 93010 ELECTROCARDIOGRAM REPORT: CPT | Performed by: INTERNAL MEDICINE

## 2024-06-13 PROCEDURE — 85025 COMPLETE CBC W/AUTO DIFF WBC: CPT | Performed by: EMERGENCY MEDICINE

## 2024-06-13 PROCEDURE — 84443 ASSAY THYROID STIM HORMONE: CPT | Performed by: EMERGENCY MEDICINE

## 2024-06-13 PROCEDURE — 99285 EMERGENCY DEPT VISIT HI MDM: CPT

## 2024-06-13 PROCEDURE — 80053 COMPREHEN METABOLIC PANEL: CPT | Performed by: EMERGENCY MEDICINE

## 2024-06-13 PROCEDURE — 80307 DRUG TEST PRSMV CHEM ANLYZR: CPT | Performed by: EMERGENCY MEDICINE

## 2024-06-13 PROCEDURE — 96365 THER/PROPH/DIAG IV INF INIT: CPT

## 2024-06-13 RX ORDER — POTASSIUM CHLORIDE 20 MEQ/1
40 TABLET, EXTENDED RELEASE ORAL ONCE
Status: COMPLETED | OUTPATIENT
Start: 2024-06-13 | End: 2024-06-13

## 2024-06-13 RX ORDER — MAGNESIUM SULFATE HEPTAHYDRATE 40 MG/ML
2 INJECTION, SOLUTION INTRAVENOUS ONCE
Status: COMPLETED | OUTPATIENT
Start: 2024-06-13 | End: 2024-06-13

## 2024-06-13 RX ADMIN — SODIUM CHLORIDE 1000 ML: 0.9 INJECTION, SOLUTION INTRAVENOUS at 21:23

## 2024-06-13 RX ADMIN — POTASSIUM CHLORIDE 40 MEQ: 1500 TABLET, EXTENDED RELEASE ORAL at 22:14

## 2024-06-13 RX ADMIN — MAGNESIUM SULFATE HEPTAHYDRATE 2 G: 40 INJECTION, SOLUTION INTRAVENOUS at 21:30

## 2024-06-13 RX ADMIN — THIAMINE HYDROCHLORIDE: 100 INJECTION, SOLUTION INTRAMUSCULAR; INTRAVENOUS at 23:17

## 2024-06-14 RX ORDER — DIPHENHYDRAMINE HYDROCHLORIDE AND LIDOCAINE HYDROCHLORIDE AND ALUMINUM HYDROXIDE AND MAGNESIUM HYDRO
10 KIT ONCE
Status: COMPLETED | OUTPATIENT
Start: 2024-06-14 | End: 2024-06-14

## 2024-06-14 RX ADMIN — DIPHENHYDRAMINE HYDROCHLORIDE AND LIDOCAINE HYDROCHLORIDE AND ALUMINUM HYDROXIDE AND MAGNESIUM HYDRO 10 ML: KIT at 03:53

## 2024-06-14 RX ADMIN — Medication 1 TABLET: at 09:10

## 2024-06-14 NOTE — DISCHARGE INSTRUCTIONS
This writer discussed the patients current presentation and recommended discharge plan with .  They agree with the patient being discharged at this time.  The patient was provided with outpatient resources for mental health and alcohol abuse.  This writer and the patient completed a safety plan.  The patient was provided with a copy of their safety plan with encouragement to utilize the plan following discharge.   In addition, the patient was instructed to call local [facility] crisis, other crisis services, 911 or to go to the nearest ER immediately if their situation changes at any time.     This writer discussed discharge plans with the patient, who agrees with and understands the discharge plans.         SAFETY PLAN  Warning Signs (thoughts, images, mood, behavior, situations) of a potential crisis: having suicidal thoughts, feeling depressed, having no motivation, unable to complete daily tasks, feeling hopelessness.    Coping Skills (what can I do to take my mind off the problem, or to keep myself safe): talk to someone/boyfriend/family,       Outside Support (who can I reach out to for support and help): boyfriend        National Suicide Prevention Hotline:  988      [facility] [phone] - Crisis   [facility] [phone] - LVF Crisis/Mobile Crisis   [phone] - SLPF Crisis   [facility]: [phone]  [facility]: [phone]   [facility] [phone] - Crisis   [facility] [phone] - Crisis     [facility] [phone] - Crisis   [facility] [phone] - Crisis   [phone] - Peer Support Talk Line (1-9pm daily)  [phone] - Teen Support Talk Line (1-9pm daily)  [phone] - [facility]   [facility] [phone]- Crisis    [facility] [phone] - Crisis   [facility] [phone] - Crisis    [facility]) [phone] - [facility] Crisis
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
## 2024-06-14 NOTE — ED NOTES
See previous [place] Suicide Risk Assessment. Re-screening not required unless change in behavior or suicidal ideation.  Behavioral Health Assessment deferred as patient is sleeping and would benefit from additional rest.  Vital signs deferred until patient awake, no signs or symptoms of respiratory distress at this time.    Once patient is awake and able to participate, will complete assessments.     Charline Davey RN  06/14/24 [id number]

## 2024-06-14 NOTE — ED NOTES
This writer discussed the patients current presentation and recommended discharge plan with .  They agree with the patient being discharged at this time.  The patient was provided with outpatient resources for mental health and alcohol abuse.  This writer and the patient completed a safety plan.  The patient was provided with a copy of their safety plan with encouragement to utilize the plan following discharge.   In addition, the patient was instructed to call local [facility] crisis, other crisis services, 911 or to go to the nearest ER immediately if their situation changes at any time.     This writer discussed discharge plans with the patient, who agrees with and understands the discharge plans.         SAFETY PLAN  Warning Signs (thoughts, images, mood, behavior, situations) of a potential crisis: having suicidal thoughts, feeling depressed, having no motivation, unable to complete daily tasks, feeling hopelessness.    Coping Skills (what can I do to take my mind off the problem, or to keep myself safe): talk to someone/boyfriend/family,       Outside Support (who can I reach out to for support and help): boyfriend, support groups        National Suicide Prevention Hotline:  988      [facility] [phone] - Crisis   [facility] [phone] - LVF Crisis/Mobile Crisis   [phone] - SLPF Crisis   [facility]: [phone]  [facility]: [phone]   [facility] [phone] - Crisis   [facility] [phone] - Crisis     [facility] [phone] - Crisis   [facility] [phone] - Crisis   [phone] - Peer Support Talk Line (1-9pm daily)  [phone] - Teen Support Talk Line (1-9pm daily)  [phone] - [facility]   [facility] [phone]- Crisis    [facility] [phone] - Crisis   [facility] [phone] - Crisis    [facility]) [phone] - [facility] [place] Crisis

## 2024-06-14 NOTE — ED CARE HANDOFF
[facility] Warm Handoff Outcome Note    Patient name Kathleen Robles  Location ED 05/ED 05 MRN [id number]  Age: 48 y.o.          Plan Type:  Warm Handoff                                                                                    Plan Date: 6/14/2024  Service:  ED Warm Handoff      Substance Use History:  ETOH    Warm Handoff Update:  Crisis gave pt OP tx information    Warm Handoff Outcome: Treatment Related Resources

## 2024-06-14 NOTE — ED NOTES
See previous [place] Suicide Risk Assessment. Re-screening not required unless change in behavior or suicidal ideation.  Behavioral Health Assessment deferred as patient is sleeping and would benefit from additional rest.  Vital signs deferred until patient awake, no signs or symptoms of respiratory distress at this time.    Once patient is awake and able to participate, will complete assessments.       Mariangel Lozoya RN  06/14/24 0702

## 2024-06-15 NOTE — ED PROVIDER NOTES
History  Chief Complaint   Patient presents with    Shortness of Breath     Patient called EMS for sob  Patient reports SI that she wants to kill herself  Coming from  home patient resides with parents   Alcohol consumption today. Patient reports having 10 wine coolers today    Psychiatric Evaluation     48-year-old female chief complaint wanting to kill herself after an argument.  States she has not done anything to harm herself.  States she had been feeling short of breath but it felt like her typical panic attack and that is completely resolved.  She has no other complaints at this time.  Patient is a somewhat poor historian as she admits to drinking a large amount of alcohol.  Her mental exam and interaction is consistent with this.        Prior to Admission Medications   Prescriptions Last Dose Informant Patient Reported? Taking?   albuterol (ProAir HFA) 90 mcg/act inhaler   No No   Sig: Inhale 2 puffs every 6 (six) hours as needed for wheezing or shortness of breath   folic acid (FOLVITE) 1 mg tablet   No No   Sig: Take 1 tablet (1 mg total) by mouth daily   furosemide (LASIX) 40 mg tablet   Yes No   glucose blood (OneTouch Ultra) test strip   No No   Sig: Use as instructed   metoclopramide (REGLAN) 5 mg tablet   No No   Sig: Take 1 pill at 2PM and 8PM the day prior to the procedure   metoprolol tartrate (LOPRESSOR) 50 mg tablet   No No   Sig: Take 1 tablet (50 mg total) by mouth every 12 (twelve) hours   mometasone-formoterol (Dulera) 100-5 MCG/ACT inhaler   No No   Sig: Inhale 2 puffs 2 (two) times a day Rinse mouth after use.   montelukast (SINGULAIR) 10 mg tablet   No No   Sig: Take 1 tablet (10 mg total) by mouth daily at bedtime   naltrexone (REVIA) 50 mg tablet   No No   Sig: Take 1 tablet (50 mg total) by mouth daily   nystatin (MYCOSTATIN) cream   No No   Sig: Apply topically 2 (two) times a day   omeprazole (PriLOSEC) 40 MG capsule   No No   Sig: Take 1 capsule (40 mg total) by mouth daily    polyethylene glycol (GOLYTELY) 4000 mL solution   No No   Sig: Take 4,000 mL by mouth once for 1 dose   potassium chloride (Klor-Con) 10 mEq tablet   Yes No   Sig: Take 10 mEq by mouth 2 (two) times a day   vitamin B-12 (VITAMIN B-12) 1,000 mcg tablet   Yes No   Sig: Take by mouth daily      Facility-Administered Medications: None       Past Medical History:   Diagnosis Date    Alcohol abuse 1/4/2024    Bipolar disorder (HCC)     Diabetes mellitus (HCC)     Hypertension     Psychiatric disorder        No past surgical history on file.    Family History   Problem Relation Age of Onset    Diabetes Mother     Diabetes Brother      I have reviewed and agree with the history as documented.    E-Cigarette/Vaping    E-Cigarette Use Never User      E-Cigarette/Vaping Substances    Nicotine No     THC No     CBD No     Flavoring No     Other No     Unknown No      Social History     Tobacco Use    Smoking status: Some Days     Current packs/day: 0.50     Average packs/day: 0.5 packs/day for 30.0 years (15.0 ttl pk-yrs)     Types: Cigarettes   Vaping Use    Vaping status: Never Used   Substance Use Topics    Alcohol use: Yes     Alcohol/week: 40.0 standard drinks of alcohol     Types: 40 Cans of beer per week     Comment: 8-12 beer per day    Drug use: Not Currently       Review of Systems   Constitutional:  Negative for activity change, chills, fatigue and fever.   HENT:  Negative for congestion.    Eyes:  Negative for visual disturbance.   Respiratory:  Negative for cough and chest tightness.    Cardiovascular:  Negative for chest pain.   Gastrointestinal:  Negative for abdominal pain, diarrhea and vomiting.   Genitourinary:  Negative for dysuria.   Skin:  Negative for rash.   Neurological:  Negative for dizziness, weakness and numbness.       Physical Exam  Physical Exam  Constitutional:       General: She is not in acute distress.     Appearance: She is well-developed. She is not ill-appearing, toxic-appearing or  diaphoretic.   HENT:      Head: Normocephalic and atraumatic.   Eyes:      Conjunctiva/sclera: Conjunctivae normal.      Pupils: Pupils are equal, round, and reactive to light.   Cardiovascular:      Rate and Rhythm: Normal rate and regular rhythm.      Heart sounds: Normal heart sounds.   Pulmonary:      Effort: Pulmonary effort is normal. No respiratory distress.      Breath sounds: Normal breath sounds.   Abdominal:      General: Bowel sounds are normal.      Palpations: Abdomen is soft.   Musculoskeletal:         General: Normal range of motion.      Cervical back: Normal range of motion and neck supple.   Skin:     General: Skin is warm and dry.      Capillary Refill: Capillary refill takes less than 2 seconds.   Neurological:      Mental Status: She is alert.      Comments: Findings consistent with acute alcohol intoxication including slurred speech, tangential, and some nystagmus when looking to extreme periphery         Vital Signs  ED Triage Vitals [06/13/24 2059]   Temperature Pulse Respirations Blood Pressure SpO2   97.6 °F (36.4 °C) 95 17 138/83 97 %      Temp Source Heart Rate Source Patient Position - Orthostatic VS BP Location FiO2 (%)   Temporal Monitor Sitting -- --      Pain Score       --           Vitals:    06/13/24 2059 06/13/24 2130   BP: 138/83 115/80   Pulse: 95 91   Patient Position - Orthostatic VS: Sitting          Visual Acuity      ED Medications  Medications   magnesium sulfate 2 g/50 mL IVPB (premix) 2 g (0 g Intravenous Stopped 6/13/24 2330)   sodium chloride 0.9 % bolus 1,000 mL (0 mL Intravenous Stopped 6/13/24 2323)   folic acid 1 mg, thiamine (VITAMIN B1) 100 mg in sodium chloride 0.9 % 100 mL IV piggyback ( Intravenous Stopped 6/13/24 2347)   potassium chloride (Klor-Con M20) CR tablet 40 mEq (40 mEq Oral Given 6/13/24 2214)   diphenhydramine, lidocaine, Al/Mg hydroxide, simethicone (Magic Mouthwash) oral solution 10 mL (10 mL Swish & Swallow Given 6/14/24 4484)       Diagnostic  Studies  Results Reviewed       Procedure Component Value Units Date/Time    Rapid drug screen, urine [[id number]]  (Normal) Collected: 06/13/24 2217    Lab Status: Final result Specimen: Urine, Clean Catch Updated: 06/13/24 2237     Amph/Meth UR Negative     Barbiturate Ur Negative     Benzodiazepine Urine Negative     Cocaine Urine Negative     Methadone Urine Negative     Opiate Urine Negative     PCP Ur Negative     THC Urine Negative     Oxycodone Urine Negative     Fentanyl Urine Negative     HYDROCODONE URINE Negative    Narrative:      FOR MEDICAL PURPOSES ONLY.   IF CONFIRMATION NEEDED PLEASE CONTACT THE LAB WITHIN 5 DAYS.    Drug Screen Cutoff Levels:  AMPHETAMINE/METHAMPHETAMINES  1000 ng/mL  BARBITURATES     200 ng/mL  BENZODIAZEPINES     200 ng/mL  COCAINE      300 ng/mL  METHADONE      300 ng/mL  OPIATES      300 ng/mL  PHENCYCLIDINE     25 ng/mL  THC       50 ng/mL  OXYCODONE      100 ng/mL  FENTANYL      5 ng/mL  HYDROCODONE     300 ng/mL    TSH [[id number]]  (Abnormal) Collected: 06/13/24 2118    Lab Status: Final result Specimen: Blood from Arm, Right Updated: 06/13/24 2157     TSH 3RD GENERATON 0.365 uIU/mL     HS Troponin 0hr (reflex protocol) [[id number]]  (Normal) Collected: 06/13/24 2118    Lab Status: Final result Specimen: Blood from Arm, Right Updated: 06/13/24 2148     hs TnI 0hr 3 ng/L     Comprehensive metabolic panel [[id number]]  (Abnormal) Collected: 06/13/24 2118    Lab Status: Final result Specimen: Blood from Arm, Right Updated: 06/13/24 2141     Sodium 132 mmol/L      Potassium 3.0 mmol/L      Chloride 95 mmol/L      CO2 27 mmol/L      ANION GAP 10 mmol/L      BUN 6 mg/dL      Creatinine 0.86 mg/dL      Glucose 123 mg/dL      Calcium 8.3 mg/dL      AST 33 U/L      ALT 19 U/L      Alkaline Phosphatase 122 U/L      Total Protein 6.7 g/dL      Albumin 3.8 g/dL      Total Bilirubin 0.27 mg/dL      eGFR 79 ml/min/1.73sq m     Narrative:      [employer] guidelines  for Chronic Kidney Disease (CKD):     Stage 1 with normal or high GFR (GFR > 90 mL/min/1.73 square meters)    Stage 2 Mild CKD (GFR = 60-89 mL/min/1.73 square meters)    Stage 3A Moderate CKD (GFR = 45-59 mL/min/1.73 square meters)    Stage 3B Moderate CKD (GFR = 30-44 mL/min/1.73 square meters)    Stage 4 Severe CKD (GFR = 15-29 mL/min/1.73 square meters)    Stage 5 End Stage CKD (GFR <15 mL/min/1.73 square meters)  Note: GFR calculation is accurate only with a steady state creatinine    Ethanol [[id number]]  (Abnormal) Collected: 06/13/24 2118    Lab Status: Final result Specimen: Blood from Arm, Right Updated: 06/13/24 2141     Ethanol Lvl 325 mg/dL     CBC and differential [[id number]]  (Abnormal) Collected: 06/13/24 2118    Lab Status: Final result Specimen: Blood from Arm, Right Updated: 06/13/24 2123     WBC 5.67 Thousand/uL      RBC 3.95 Million/uL      Hemoglobin 11.0 g/dL      Hematocrit 34.7 %      MCV 88 fL      MCH 27.8 pg      MCHC 31.7 g/dL      RDW 17.5 %      MPV 9.8 fL      Platelets 252 Thousands/uL      nRBC 0 /100 WBCs      Segmented % 43 %      Immature Grans % 0 %      Lymphocytes % 41 %      Monocytes % 9 %      Eosinophils Relative 5 %      Basophils Relative 2 %      Absolute Neutrophils 2.40 Thousands/µL      Absolute Immature Grans 0.01 Thousand/uL      Absolute Lymphocytes 2.33 Thousands/µL      Absolute Monocytes 0.52 Thousand/µL      Eosinophils Absolute 0.29 Thousand/µL      Basophils Absolute 0.12 Thousands/µL                    No orders to display              Procedures  Procedures         ED Course  ED Course as of 06/15/24 0246   Thu Jun 13, 2024 2219 Patient medically cleared for inpatient psychiatric evaluation and management.                               SBIRT 20yo+      Flowsheet Row Most Recent Value   Initial Alcohol Screen: US AUDIT-C     1. How often do you have a drink containing alcohol? 6 Filed at: 06/13/2024 2228   2. How many drinks containing alcohol do you have  on a typical day you are drinking?  6 Filed at: 06/13/2024 2228   3a. Male UNDER 65: How often do you have five or more drinks on one occasion? 0 Filed at: 06/13/2024 2228   3b. FEMALE Any Age, or MALE 65+: How often do you have 4 or more drinks on one occassion? 5 Filed at: 06/13/2024 2228   Audit-C Score 17 Filed at: 06/13/2024 2228   LUANN: How many times in the past year have you...    Used an illegal drug or used a prescription medication for non-medical reasons? Never Filed at: 06/13/2024 2228                      Medical Decision Making  48-year-old female with suicidal thoughts while heavily intoxicated.  One-to-one ordered.  Behavioral health evaluation ordered.  We will hold off on evaluating her with crisis team until patient is legally sober.  Dyspnea likely related to patient's anxiety.  She self-reports this.  No other causes identified.  Patient was found to have some mild hypokalemia.  Given repletion.  Stable at the time of signout.  Awaiting crisis eval.    Problems Addressed:  Alcohol intoxication (HCC): acute illness or injury  Dyspnea: acute illness or injury  Hypokalemia: acute illness or injury  Suicidal ideation: acute illness or injury    Amount and/or Complexity of Data Reviewed  Independent Historian: EMS  External Data Reviewed: notes.  Labs: ordered.  ECG/medicine tests: ordered and independent interpretation performed.    Risk  OTC drugs.  Prescription drug management.  Drug therapy requiring intensive monitoring for toxicity.             Disposition  Final diagnoses:   Dyspnea   Suicidal ideation   Alcohol intoxication (HCC)   Hypokalemia     Time reflects when diagnosis was documented in both MDM as applicable and the Disposition within this note       Time User Action Codes Description Comment    6/13/2024 10:19 PM Wesly Max [R06.00] Dyspnea     6/13/2024 10:20 PM Wesly Max [R45.851] Suicidal ideation     6/13/2024 10:20 PM Wesly Max [F10.929] Alcohol  intoxication (HCC)     6/13/2024 10:20 PM Winter Wesly Paredes [E87.6] Hypokalemia           ED Disposition       ED Disposition   Discharge    Condition   Stable    Date/Time   Fri Jun 14, 2024 1041    Comment   Kathleen Robles should be transferred out to  and has been medically cleared.               Follow-up Information       Follow up With Specialties Details Why Contact Info    Sharonda Cochran MD Internal Medicine Go to  for further evaluation and treatment [address] [place] [address]  [phone]              Discharge Medication List as of 6/14/2024 10:41 AM        CONTINUE these medications which have NOT CHANGED    Details   albuterol (ProAir HFA) 90 mcg/act inhaler Inhale 2 puffs every 6 (six) hours as needed for wheezing or shortness of breath, Starting Mon 1/8/2024, Normal      folic acid (FOLVITE) 1 mg tablet Take 1 tablet (1 mg total) by mouth daily, Starting Thu 5/23/2024, Normal      furosemide (LASIX) 40 mg tablet Historical Med      glucose blood (OneTouch Ultra) test strip Use as instructed, Normal      metoclopramide (REGLAN) 5 mg tablet Take 1 pill at 2PM and 8PM the day prior to the procedure, Normal      metoprolol tartrate (LOPRESSOR) 50 mg tablet Take 1 tablet (50 mg total) by mouth every 12 (twelve) hours, Starting Wed 2/28/2024, Normal      mometasone-formoterol (Dulera) 100-5 MCG/ACT inhaler Inhale 2 puffs 2 (two) times a day Rinse mouth after use., Starting Mon 1/8/2024, Normal      montelukast (SINGULAIR) 10 mg tablet Take 1 tablet (10 mg total) by mouth daily at bedtime, Starting Mon 1/8/2024, Normal      naltrexone (REVIA) 50 mg tablet Take 1 tablet (50 mg total) by mouth daily, Starting Wed 2/28/2024, Normal      nystatin (MYCOSTATIN) cream Apply topically 2 (two) times a day, Starting Wed 6/5/2024, Normal      omeprazole (PriLOSEC) 40 MG capsule Take 1 capsule (40 mg total) by mouth daily, Starting Mon 3/4/2024, Normal      polyethylene glycol (GOLYTELY) 4000 mL  solution Take 4,000 mL by mouth once for 1 dose, Starting Mon 3/4/2024, Normal      potassium chloride (Klor-Con) 10 mEq tablet Take 10 mEq by mouth 2 (two) times a day, Historical Med      vitamin B-12 (VITAMIN B-12) 1,000 mcg tablet Take by mouth daily, Historical Med             No discharge procedures on file.    PDMP Review       None            ED Provider  Electronically Signed by             Wesly Max MD  06/15/24 0251

## 2024-07-02 ENCOUNTER — TELEPHONE (OUTPATIENT)
Age: 49
End: 2024-07-02

## 2024-07-02 NOTE — TELEPHONE ENCOUNTER
Patient called for an update, I did inform her that the message was sent to provider. Patient requests a call with update.

## 2024-07-02 NOTE — TELEPHONE ENCOUNTER
Patient called, Requesting a letter to [employer] to turn electricity back on due to medical condition.  She states she uses an inhaler, and nebulizer and is sick and needs to be able to use machine.    Account number L : [id number]  Fax: [phone]    Please call back patient to let know if a letter can be granted    Please Advise.  Thank you.

## 2024-07-05 DIAGNOSIS — J45.20 MILD INTERMITTENT ASTHMA WITHOUT COMPLICATION: ICD-10-CM

## 2024-07-06 RX ORDER — MONTELUKAST SODIUM 10 MG/1
10 TABLET ORAL
Qty: 90 TABLET | Refills: 1 | Status: SHIPPED | OUTPATIENT
Start: 2024-07-06

## 2024-07-08 ENCOUNTER — TELEPHONE (OUTPATIENT)
Dept: FAMILY MEDICINE CLINIC | Facility: CLINIC | Age: 49
End: 2024-07-08

## 2024-07-09 NOTE — TELEPHONE ENCOUNTER
On chart review not on medication list, not prescribed in past due to no recent A1c. I would recommend we wait for Dr. Cochran's return next week. It looks like it hasn't been prescribed in over 6 months.

## 2024-07-18 ENCOUNTER — VBI (OUTPATIENT)
Dept: ADMINISTRATIVE | Facility: OTHER | Age: 49
End: 2024-07-18

## 2024-07-18 NOTE — TELEPHONE ENCOUNTER
07/18/24 9:55 AM     Chart reviewed for Diabetic Eye Exam was/were not submitted to the patient's insurance.     Stacy Vo MA   [facility]

## 2024-08-02 ENCOUNTER — HOSPITAL ENCOUNTER (EMERGENCY)
Facility: HOSPITAL | Age: 49
Discharge: HOME | End: 2024-08-02
Attending: FAMILY MEDICINE | Admitting: FAMILY MEDICINE
Payer: COMMERCIAL

## 2024-08-02 VITALS
RESPIRATION RATE: 20 BRPM | OXYGEN SATURATION: 98 % | TEMPERATURE: 97.8 F | DIASTOLIC BLOOD PRESSURE: 91 MMHG | HEART RATE: 81 BPM | SYSTOLIC BLOOD PRESSURE: 146 MMHG

## 2024-08-02 DIAGNOSIS — F10.929 ALCOHOL INTOXICATION (HCC): Primary | ICD-10-CM

## 2024-08-02 DIAGNOSIS — E87.6 HYPOKALEMIA: ICD-10-CM

## 2024-08-02 LAB
ANION GAP SERPL CALCULATED.3IONS-SCNC: 11 MMOL/L (ref 4–13)
BASOPHILS # BLD AUTO: 0.22 THOUSANDS/ÂΜL (ref 0–0.1)
BASOPHILS NFR BLD AUTO: 2 % (ref 0–1)
BUN SERPL-MCNC: 15 MG/DL (ref 5–25)
CALCIUM SERPL-MCNC: 9.2 MG/DL (ref 8.4–10.2)
CHLORIDE SERPL-SCNC: 101 MMOL/L (ref 96–108)
CO2 SERPL-SCNC: 20 MMOL/L (ref 21–32)
CREAT SERPL-MCNC: 1 MG/DL (ref 0.6–1.3)
EOSINOPHIL # BLD AUTO: 0.66 THOUSAND/ÂΜL (ref 0–0.61)
EOSINOPHIL NFR BLD AUTO: 7 % (ref 0–6)
ERYTHROCYTE [DISTWIDTH] IN BLOOD BY AUTOMATED COUNT: 18.4 % (ref 11.6–15.1)
GFR SERPL CREATININE-BSD FRML MDRD: 65 ML/MIN/1.73SQ M
GLUCOSE SERPL-MCNC: 110 MG/DL (ref 65–140)
HCT VFR BLD AUTO: 36.8 % (ref 34.8–46.1)
HGB BLD-MCNC: 11.5 G/DL (ref 11.5–15.4)
IMM GRANULOCYTES # BLD AUTO: 0.02 THOUSAND/UL (ref 0–0.2)
IMM GRANULOCYTES NFR BLD AUTO: 0 % (ref 0–2)
LYMPHOCYTES # BLD AUTO: 3.94 THOUSANDS/ÂΜL (ref 0.6–4.47)
LYMPHOCYTES NFR BLD AUTO: 40 % (ref 14–44)
MCH RBC QN AUTO: 27.7 PG (ref 26.8–34.3)
MCHC RBC AUTO-ENTMCNC: 31.3 G/DL (ref 31.4–37.4)
MCV RBC AUTO: 89 FL (ref 82–98)
MONOCYTES # BLD AUTO: 0.84 THOUSAND/ÂΜL (ref 0.17–1.22)
MONOCYTES NFR BLD AUTO: 9 % (ref 4–12)
NEUTROPHILS # BLD AUTO: 4.2 THOUSANDS/ÂΜL (ref 1.85–7.62)
NEUTS SEG NFR BLD AUTO: 42 % (ref 43–75)
NRBC BLD AUTO-RTO: 0 /100 WBCS
PLATELET # BLD AUTO: 322 THOUSANDS/UL (ref 149–390)
PMV BLD AUTO: 11.6 FL (ref 8.9–12.7)
POTASSIUM SERPL-SCNC: 3.4 MMOL/L (ref 3.5–5.3)
RBC # BLD AUTO: 4.15 MILLION/UL (ref 3.81–5.12)
SODIUM SERPL-SCNC: 132 MMOL/L (ref 135–147)
WBC # BLD AUTO: 9.88 THOUSAND/UL (ref 4.31–10.16)

## 2024-08-02 PROCEDURE — 99284 EMERGENCY DEPT VISIT MOD MDM: CPT | Performed by: FAMILY MEDICINE

## 2024-08-02 PROCEDURE — 99284 EMERGENCY DEPT VISIT MOD MDM: CPT

## 2024-08-02 PROCEDURE — 85025 COMPLETE CBC W/AUTO DIFF WBC: CPT | Performed by: FAMILY MEDICINE

## 2024-08-02 PROCEDURE — 36415 COLL VENOUS BLD VENIPUNCTURE: CPT | Performed by: FAMILY MEDICINE

## 2024-08-02 PROCEDURE — 80048 BASIC METABOLIC PNL TOTAL CA: CPT | Performed by: FAMILY MEDICINE

## 2024-08-02 RX ORDER — POTASSIUM CHLORIDE 20 MEQ/1
40 TABLET, EXTENDED RELEASE ORAL ONCE
Status: COMPLETED | OUTPATIENT
Start: 2024-08-02 | End: 2024-08-02

## 2024-08-02 RX ADMIN — POTASSIUM CHLORIDE 40 MEQ: 1500 TABLET, EXTENDED RELEASE ORAL at 10:38

## 2024-08-02 NOTE — ED PROVIDER NOTES
History  Chief Complaint   Patient presents with    Alcohol Intoxication    Psychiatric Evaluation     HPI  49-year-old female with a history of alcohol abuse presented to ED via EMS for alcohol intoxication.  Patient states that she has been drinking had 6 and a 4 pack yesterday and 2 today.  Patient states that she got into an argument with someone who was calling her names so she called EMS.  She denies any suicidal homicidal ideation.  Patient is not requesting to be discharged patient is refusing the rehab refusing IV or fluid  Prior to Admission Medications   Prescriptions Last Dose Informant Patient Reported? Taking?   albuterol (ProAir HFA) 90 mcg/act inhaler   No No   Sig: Inhale 2 puffs every 6 (six) hours as needed for wheezing or shortness of breath   folic acid (FOLVITE) 1 mg tablet   No No   Sig: Take 1 tablet (1 mg total) by mouth daily   furosemide (LASIX) 40 mg tablet   Yes No   glucose blood (OneTouch Ultra) test strip   No No   Sig: Use as instructed   metoclopramide (REGLAN) 5 mg tablet   No No   Sig: Take 1 pill at 2PM and 8PM the day prior to the procedure   metoprolol tartrate (LOPRESSOR) 50 mg tablet   No No   Sig: Take 1 tablet (50 mg total) by mouth every 12 (twelve) hours   mometasone-formoterol (Dulera) 100-5 MCG/ACT inhaler   No No   Sig: Inhale 2 puffs 2 (two) times a day Rinse mouth after use.   montelukast (SINGULAIR) 10 mg tablet   No No   Sig: Take 1 tablet (10 mg total) by mouth daily at bedtime   naltrexone (REVIA) 50 mg tablet   No No   Sig: Take 1 tablet (50 mg total) by mouth daily   nystatin (MYCOSTATIN) cream   No No   Sig: Apply topically 2 (two) times a day   omeprazole (PriLOSEC) 40 MG capsule   No No   Sig: Take 1 capsule (40 mg total) by mouth daily   polyethylene glycol (GOLYTELY) 4000 mL solution   No No   Sig: Take 4,000 mL by mouth once for 1 dose   potassium chloride (Klor-Con) 10 mEq tablet   Yes No   Sig: Take 10 mEq by mouth 2 (two) times a day   vitamin B-12  (VITAMIN B-12) 1,000 mcg tablet   Yes No   Sig: Take by mouth daily      Facility-Administered Medications: None       Past Medical History:   Diagnosis Date    Alcohol abuse 1/4/2024    Bipolar disorder (HCC)     Diabetes mellitus (HCC)     Hypertension     Psychiatric disorder        No past surgical history on file.    Family History   Problem Relation Age of Onset    Diabetes Mother     Diabetes Brother      I have reviewed and agree with the history as documented.    E-Cigarette/Vaping    E-Cigarette Use Never User      E-Cigarette/Vaping Substances    Nicotine No     THC No     CBD No     Flavoring No     Other No     Unknown No      Social History     Tobacco Use    Smoking status: Some Days     Current packs/day: 0.50     Average packs/day: 0.5 packs/day for 30.0 years (15.0 ttl pk-yrs)     Types: Cigarettes   Vaping Use    Vaping status: Never Used   Substance Use Topics    Alcohol use: Yes     Alcohol/week: 40.0 standard drinks of alcohol     Types: 40 Cans of beer per week     Comment: 8-12 beer per day    Drug use: Not Currently       Review of Systems   Constitutional:  Negative for chills and fever.   HENT:  Negative for rhinorrhea and sore throat.    Eyes:  Negative for visual disturbance.   Respiratory:  Negative for cough and shortness of breath.    Cardiovascular:  Negative for chest pain and leg swelling.   Gastrointestinal:  Negative for abdominal pain, diarrhea, nausea and vomiting.   Genitourinary:  Negative for dysuria.   Musculoskeletal:  Negative for back pain and myalgias.   Skin:  Negative for rash.   Neurological:  Negative for dizziness and headaches.   Psychiatric/Behavioral:  Negative for confusion and suicidal ideas.    All other systems reviewed and are negative.      Physical Exam  Physical Exam  Vitals and nursing note reviewed.   Constitutional:       Appearance: She is well-developed.      Comments: Intoxicated   HENT:      Head: Normocephalic and atraumatic.      Right Ear:  External ear normal.      Left Ear: External ear normal.      Nose: Nose normal.      Mouth/Throat:      Mouth: Mucous membranes are moist.      Pharynx: No oropharyngeal exudate.   Eyes:      General: No scleral icterus.        Right eye: No discharge.         Left eye: No discharge.      Conjunctiva/sclera: Conjunctivae normal.      Pupils: Pupils are equal, round, and reactive to light.   Cardiovascular:      Rate and Rhythm: Normal rate and regular rhythm.      Pulses: Normal pulses.      Heart sounds: Normal heart sounds.   Pulmonary:      Effort: Pulmonary effort is normal. No respiratory distress.      Breath sounds: Normal breath sounds. No wheezing.   Abdominal:      General: Bowel sounds are normal.      Palpations: Abdomen is soft.   Musculoskeletal:         General: Normal range of motion.      Cervical back: Normal range of motion and neck supple.   Lymphadenopathy:      Cervical: No cervical adenopathy.   Skin:     General: Skin is warm and dry.      Capillary Refill: Capillary refill takes less than 2 seconds.   Neurological:      General: No focal deficit present.      Mental Status: She is alert and oriented to person, place, and time.   Psychiatric:         Mood and Affect: Mood normal.         Behavior: Behavior normal.         Vital Signs  ED Triage Vitals   Temperature Pulse Respirations Blood Pressure SpO2   08/02/24 0908 08/02/24 0908 08/02/24 0908 08/02/24 0910 08/02/24 0908   97.8 °F (36.6 °C) 81 20 146/91 98 %      Temp Source Heart Rate Source Patient Position - Orthostatic VS BP Location FiO2 (%)   08/02/24 0908 08/02/24 0908 08/02/24 0910 08/02/24 0910 --   Temporal Monitor Lying Left arm       Pain Score       08/02/24 0908       No Pain           Vitals:    08/02/24 0908 08/02/24 0910   BP:  146/91   Pulse: 81    Patient Position - Orthostatic VS:  Lying         Visual Acuity  Visual Acuity      Flowsheet Row Most Recent Value   L Pupil Size (mm) 3   R Pupil Size (mm) 3            ED  Medications  Medications   sodium chloride 0.9 % bolus 1,000 mL (1,000 mL Intravenous Not Given 8/2/24 0956)   potassium chloride (Klor-Con M20) CR tablet 40 mEq (40 mEq Oral Given 8/2/24 1038)       Diagnostic Studies  Results Reviewed       Procedure Component Value Units Date/Time    Basic metabolic panel [[id number]]  (Abnormal) Collected: 08/02/24 0954    Lab Status: Final result Specimen: Blood from Arm, Right Updated: 08/02/24 1017     Sodium 132 mmol/L      Potassium 3.4 mmol/L      Chloride 101 mmol/L      CO2 20 mmol/L      ANION GAP 11 mmol/L      BUN 15 mg/dL      Creatinine 1.00 mg/dL      Glucose 110 mg/dL      Calcium 9.2 mg/dL      eGFR 65 ml/min/1.73sq m     Narrative:      National Kid[employer] guidelines for Chronic Kidney Disease (CKD):     Stage 1 with normal or high GFR (GFR > 90 mL/min/1.73 square meters)    Stage 2 Mild CKD (GFR = 60-89 mL/min/1.73 square meters)    Stage 3A Moderate CKD (GFR = 45-59 mL/min/1.73 square meters)    Stage 3B Moderate CKD (GFR = 30-44 mL/min/1.73 square meters)    Stage 4 Severe CKD (GFR = 15-29 mL/min/1.73 square meters)    Stage 5 End Stage CKD (GFR <15 mL/min/1.73 square meters)  Note: GFR calculation is accurate only with a steady state creatinine    CBC and differential [[id number]]  (Abnormal) Collected: 08/02/24 0954    Lab Status: Final result Specimen: Blood from Arm, Right Updated: 08/02/24 0959     WBC 9.88 Thousand/uL      RBC 4.15 Million/uL      Hemoglobin 11.5 g/dL      Hematocrit 36.8 %      MCV 89 fL      MCH 27.7 pg      MCHC 31.3 g/dL      RDW 18.4 %      MPV 11.6 fL      Platelets 322 Thousands/uL      nRBC 0 /100 WBCs      Segmented % 42 %      Immature Grans % 0 %      Lymphocytes % 40 %      Monocytes % 9 %      Eosinophils Relative 7 %      Basophils Relative 2 %      Absolute Neutrophils 4.20 Thousands/µL      Absolute Immature Grans 0.02 Thousand/uL      Absolute Lymphocytes 3.94 Thousands/µL      Absolute Monocytes 0.84  Thousand/µL      Eosinophils Absolute 0.66 Thousand/µL      Basophils Absolute 0.22 Thousands/µL                    No orders to display              Procedures  Procedures         ED Course  ED Course as of 08/02/24 1047   Fri Aug 02, 2024   0956 Pt is refusing Fluid and wants to go home. Attempted to call patient family no answer.                                  SBIRT 20yo+      Flowsheet Row Most Recent Value   Initial Alcohol Screen: US AUDIT-C     1. How often do you have a drink containing alcohol? 6 Filed at: 08/02/2024 0916   2. How many drinks containing alcohol do you have on a typical day you are drinking?  0 Filed at: 08/02/2024 0916   3a. Male UNDER 65: How often do you have five or more drinks on one occasion? 0 Filed at: 08/02/2024 0916   3b. FEMALE Any Age, or MALE 65+: How often do you have 4 or more drinks on one occassion? 0 Filed at: 08/02/2024 0916   Audit-C Score 6 Filed at: 08/02/2024 0916   LUANN: How many times in the past year have you...    Used an illegal drug or used a prescription medication for non-medical reasons? Never Filed at: 08/02/2024 0916                      Medical Decision Making  49-year-old female with a history of alcohol abuse presented to ED via EMS for alcohol intoxication.  Patient states that she has been drinking had 6 and a 4 pack yesterday and 2 today.  Patient states that she got into an argument with someone who was calling her names so she called EMS.  She denies any suicidal homicidal ideation.  Patient is not requesting to be discharged patient is refusing the rehab refusing IV or fluid.  History taken from patient and EMS  Differential diagnoses include alcohol intoxication patient is clearly intoxicated.  Patient is refusing workup but did not want to have blood work.  Pulled her IV out.  Patient did set up a ride for self waiting for her pickup.  Pt ride is here .  Disposition discharge home Home with strict precaution to return.  Patient was offered rehab  which she refused.    Amount and/or Complexity of Data Reviewed  Labs: ordered.    Risk  Prescription drug management.                 Disposition  Final diagnoses:   Alcohol intoxication (HCC)   Hypokalemia     Time reflects when diagnosis was documented in both MDM as applicable and the Disposition within this note       Time User Action Codes Description Comment    8/2/2024 10:40 AM Sara Josias Add [F10.929] Alcohol intoxication (HCC)     8/2/2024 10:46 AM Sara Josias Add [E87.6] Hypokalemia           ED Disposition       ED Disposition   Discharge    Condition   Stable    Date/Time   Fri Aug 2, 2024 1008    Comment   Kathleen JUS Robles discharge to home/self care.                   Follow-up Information       Follow up With Specialties Details Why Contact Info    Sharonda Cochran MD Internal Medicine Call today  [address] [place] [address]  [phone]              Current Discharge Medication List        CONTINUE these medications which have NOT CHANGED    Details   albuterol (ProAir HFA) 90 mcg/act inhaler Inhale 2 puffs every 6 (six) hours as needed for wheezing or shortness of breath  Qty: 18 g, Refills: 3    Comments: Substitution to a formulary equivalent within the same pharmaceutical class is authorized.  Associated Diagnoses: Mild intermittent asthma with acute exacerbation; Mild intermittent asthma without complication      folic acid (FOLVITE) 1 mg tablet Take 1 tablet (1 mg total) by mouth daily  Qty: 90 tablet, Refills: 0    Associated Diagnoses: Anemia, unspecified type      furosemide (LASIX) 40 mg tablet       glucose blood (OneTouch Ultra) test strip Use as instructed  Qty: 100 each, Refills: 1    Associated Diagnoses: Pre-diabetes      metoclopramide (REGLAN) 5 mg tablet Take 1 pill at 2PM and 8PM the day prior to the procedure  Qty: 2 tablet, Refills: 0    Associated Diagnoses: Early satiety      metoprolol tartrate (LOPRESSOR) 50 mg tablet Take 1 tablet (50 mg total) by mouth  every 12 (twelve) hours  Qty: 180 tablet, Refills: 1    Associated Diagnoses: Hypertension, unspecified type      mometasone-formoterol (Dulera) 100-5 MCG/ACT inhaler Inhale 2 puffs 2 (two) times a day Rinse mouth after use.  Qty: 13 g, Refills: 3    Associated Diagnoses: Mild intermittent asthma without complication      montelukast (SINGULAIR) 10 mg tablet Take 1 tablet (10 mg total) by mouth daily at bedtime  Qty: 90 tablet, Refills: 1    Associated Diagnoses: Mild intermittent asthma without complication      naltrexone (REVIA) 50 mg tablet Take 1 tablet (50 mg total) by mouth daily  Qty: 90 tablet, Refills: 0    Associated Diagnoses: Alcohol abuse; Alcohol use disorder, moderate, dependence (HCC)      nystatin (MYCOSTATIN) cream Apply topically 2 (two) times a day  Qty: 30 g, Refills: 1    Associated Diagnoses: Candidiasis of breast      omeprazole (PriLOSEC) 40 MG capsule Take 1 capsule (40 mg total) by mouth daily  Qty: 90 capsule, Refills: 1    Associated Diagnoses: Gastroesophageal reflux disease without esophagitis      polyethylene glycol (GOLYTELY) 4000 mL solution Take 4,000 mL by mouth once for 1 dose  Qty: 4000 mL, Refills: 0    Associated Diagnoses: Screening for colon cancer      potassium chloride (Klor-Con) 10 mEq tablet Take 10 mEq by mouth 2 (two) times a day      vitamin B-12 (VITAMIN B-12) 1,000 mcg tablet Take by mouth daily             No discharge procedures on file.    PDMP Review       None            ED Provider  Electronically Signed by             Josias Ruiz MD  08/02/24 4065

## 2024-08-02 NOTE — ED NOTES
Patient refusing fluid bolus/treatment at this time.  Patient states she is walking out of the ED and is not staying.        Mariangel Lozoya RN  08/02/24 0962

## 2024-08-02 NOTE — ED NOTES
Patient arranged transport home with [facility], they will be arriving at 1045.     Mariangel Lozoya RN  08/02/24 1010

## 2024-08-05 LAB
LEFT EYE DIABETIC RETINOPATHY: NORMAL
RIGHT EYE DIABETIC RETINOPATHY: NORMAL

## 2024-08-28 ENCOUNTER — APPOINTMENT (OUTPATIENT)
Dept: LAB | Facility: CLINIC | Age: 49
End: 2024-08-28
Payer: COMMERCIAL

## 2024-08-28 ENCOUNTER — OFFICE VISIT (OUTPATIENT)
Dept: FAMILY MEDICINE CLINIC | Facility: CLINIC | Age: 49
End: 2024-08-28
Payer: COMMERCIAL

## 2024-08-28 VITALS
HEIGHT: 69 IN | HEART RATE: 102 BPM | BODY MASS INDEX: 35.1 KG/M2 | RESPIRATION RATE: 18 BRPM | SYSTOLIC BLOOD PRESSURE: 124 MMHG | WEIGHT: 237 LBS | TEMPERATURE: 97.2 F | OXYGEN SATURATION: 99 % | DIASTOLIC BLOOD PRESSURE: 78 MMHG

## 2024-08-28 DIAGNOSIS — E66.09 CLASS 1 OBESITY DUE TO EXCESS CALORIES WITH SERIOUS COMORBIDITY AND BODY MASS INDEX (BMI) OF 31.0 TO 31.9 IN ADULT: ICD-10-CM

## 2024-08-28 DIAGNOSIS — R73.03 PRE-DIABETES: Primary | ICD-10-CM

## 2024-08-28 DIAGNOSIS — D50.9 IRON DEFICIENCY ANEMIA, UNSPECIFIED IRON DEFICIENCY ANEMIA TYPE: ICD-10-CM

## 2024-08-28 DIAGNOSIS — Z86.19 HISTORY OF HEPATITIS C: ICD-10-CM

## 2024-08-28 DIAGNOSIS — F10.20 ALCOHOL USE DISORDER, MODERATE, DEPENDENCE (HCC): ICD-10-CM

## 2024-08-28 DIAGNOSIS — Z12.39 ENCOUNTER FOR SCREENING FOR MALIGNANT NEOPLASM OF BREAST, UNSPECIFIED SCREENING MODALITY: ICD-10-CM

## 2024-08-28 DIAGNOSIS — R73.03 PRE-DIABETES: ICD-10-CM

## 2024-08-28 DIAGNOSIS — I10 PRIMARY HYPERTENSION: ICD-10-CM

## 2024-08-28 DIAGNOSIS — R19.5 LOOSE STOOLS: ICD-10-CM

## 2024-08-28 LAB
ALBUMIN SERPL BCG-MCNC: 3.8 G/DL (ref 3.5–5)
ALP SERPL-CCNC: 106 U/L (ref 34–104)
ALT SERPL W P-5'-P-CCNC: 21 U/L (ref 7–52)
ANION GAP SERPL CALCULATED.3IONS-SCNC: 9 MMOL/L (ref 4–13)
AST SERPL W P-5'-P-CCNC: 26 U/L (ref 13–39)
BILIRUB SERPL-MCNC: 0.29 MG/DL (ref 0.2–1)
BUN SERPL-MCNC: 22 MG/DL (ref 5–25)
CALCIUM SERPL-MCNC: 9.2 MG/DL (ref 8.4–10.2)
CHLORIDE SERPL-SCNC: 106 MMOL/L (ref 96–108)
CHOLEST SERPL-MCNC: 198 MG/DL
CO2 SERPL-SCNC: 24 MMOL/L (ref 21–32)
CREAT SERPL-MCNC: 0.97 MG/DL (ref 0.6–1.3)
EST. AVERAGE GLUCOSE BLD GHB EST-MCNC: 120 MG/DL
GFR SERPL CREATININE-BSD FRML MDRD: 67 ML/MIN/1.73SQ M
GLUCOSE P FAST SERPL-MCNC: 103 MG/DL (ref 65–99)
HBA1C MFR BLD: 5.8 %
HDLC SERPL-MCNC: 73 MG/DL
IGA SERPL-MCNC: 183 MG/DL (ref 66–433)
INR PPP: 0.98 (ref 0.85–1.19)
IRON SATN MFR SERPL: 7 % (ref 15–50)
IRON SERPL-MCNC: 27 UG/DL (ref 50–212)
LDLC SERPL CALC-MCNC: 97 MG/DL (ref 0–100)
POTASSIUM SERPL-SCNC: 4.1 MMOL/L (ref 3.5–5.3)
PROT SERPL-MCNC: 6.6 G/DL (ref 6.4–8.4)
PROTHROMBIN TIME: 13.3 SECONDS (ref 12.3–15)
SODIUM SERPL-SCNC: 139 MMOL/L (ref 135–147)
TIBC SERPL-MCNC: 391 UG/DL (ref 250–450)
TRIGL SERPL-MCNC: 138 MG/DL
UIBC SERPL-MCNC: 364 UG/DL (ref 155–355)

## 2024-08-28 PROCEDURE — 87521 HEPATITIS C PROBE&RVRS TRNSC: CPT

## 2024-08-28 PROCEDURE — 82784 ASSAY IGA/IGD/IGG/IGM EACH: CPT

## 2024-08-28 PROCEDURE — 80053 COMPREHEN METABOLIC PANEL: CPT

## 2024-08-28 PROCEDURE — 87522 HEPATITIS C REVRS TRNSCRPJ: CPT

## 2024-08-28 PROCEDURE — 99214 OFFICE O/P EST MOD 30 MIN: CPT | Performed by: INTERNAL MEDICINE

## 2024-08-28 PROCEDURE — 36415 COLL VENOUS BLD VENIPUNCTURE: CPT

## 2024-08-28 PROCEDURE — 82728 ASSAY OF FERRITIN: CPT

## 2024-08-28 PROCEDURE — 85610 PROTHROMBIN TIME: CPT

## 2024-08-28 PROCEDURE — 85025 COMPLETE CBC W/AUTO DIFF WBC: CPT

## 2024-08-28 PROCEDURE — 86364 TISS TRNSGLTMNASE EA IG CLAS: CPT

## 2024-08-28 PROCEDURE — 84443 ASSAY THYROID STIM HORMONE: CPT

## 2024-08-28 PROCEDURE — 83550 IRON BINDING TEST: CPT

## 2024-08-28 PROCEDURE — 83540 ASSAY OF IRON: CPT

## 2024-08-28 NOTE — PROGRESS NOTES
Ambulatory Visit  Name: Kathleen Robles      : 1975      MRN: [id number]  Encounter Provider: Sharonda Cochran MD  Encounter Date: 2024   Encounter department: [facility]    Assessment & Plan   1. Pre-diabetes  -     Hemoglobin A1C; Future; Expected date: 2024  2. Encounter for screening for malignant neoplasm of breast, unspecified screening modality  -     Mammo diagnostic left w 3d & cad; Future  3. Class 1 obesity due to excess calories with serious comorbidity and body mass index (BMI) of 31.0 to 31.9 in adult  -     Hemoglobin A1C; Future; Expected date: 2024  -     Comprehensive metabolic panel; Future; Expected date: 2024  -     CBC and differential; Future; Expected date: 2024  4. Alcohol use disorder, moderate, dependence (HCC)       History of Present Illness     50yo female with history of HTN, pre-diabetes, alcohol use disorder here for 6 month follow up. Has been out of trulicity over 6 months, and has increased appetite and weight gain. Trying to quit smoking, reduce alcohol intake. In ER again last week due to fall and intoxication.         Review of Systems   Constitutional:  Positive for appetite change and unexpected weight change. Negative for chills, fatigue and fever.   Respiratory:  Negative for chest tightness and shortness of breath.    Skin:  Negative for rash.     Current Outpatient Medications on File Prior to Visit   Medication Sig Dispense Refill   • albuterol (ProAir HFA) 90 mcg/act inhaler Inhale 2 puffs every 6 (six) hours as needed for wheezing or shortness of breath 18 g 3   • folic acid (FOLVITE) 1 mg tablet Take 1 tablet (1 mg total) by mouth daily 90 tablet 0   • furosemide (LASIX) 40 mg tablet      • glucose blood (OneTouch Ultra) test strip Use as instructed 100 each 1   • metoclopramide (REGLAN) 5 mg tablet Take 1 pill at 2PM and 8PM the day prior to the procedure 2 tablet 0   • metoprolol tartrate (LOPRESSOR)  "50 mg tablet Take 1 tablet (50 mg total) by mouth every 12 (twelve) hours 180 tablet 1   • mometasone-formoterol (Dulera) 100-5 MCG/ACT inhaler Inhale 2 puffs 2 (two) times a day Rinse mouth after use. 13 g 3   • montelukast (SINGULAIR) 10 mg tablet Take 1 tablet (10 mg total) by mouth daily at bedtime 90 tablet 1   • naltrexone (REVIA) 50 mg tablet Take 1 tablet (50 mg total) by mouth daily 90 tablet 0   • nystatin (MYCOSTATIN) cream Apply topically 2 (two) times a day 30 g 1   • omeprazole (PriLOSEC) 40 MG capsule Take 1 capsule (40 mg total) by mouth daily 90 capsule 1   • potassium chloride (Klor-Con) 10 mEq tablet Take 10 mEq by mouth 2 (two) times a day     • vitamin B-12 (VITAMIN B-12) 1,000 mcg tablet Take by mouth daily     • polyethylene glycol (GOLYTELY) 4000 mL solution Take 4,000 mL by mouth once for 1 dose 4000 mL 0     No current facility-administered medications on file prior to visit.      Objective     /78   Pulse 102   Temp (!) 97.2 °F (36.2 °C)   Resp 18   Ht 5' 9\" (1.753 m)   Wt 108 kg (237 lb)   SpO2 99%   BMI 35.00 kg/m²     Physical Exam  Vitals reviewed.   Constitutional:       General: She is not in acute distress.     Appearance: She is obese.   Cardiovascular:      Rate and Rhythm: Normal rate and regular rhythm.      Heart sounds: No murmur heard.     No friction rub. No gallop.   Pulmonary:      Effort: Pulmonary effort is normal. No respiratory distress.      Breath sounds: No wheezing, rhonchi or rales.   Neurological:      General: No focal deficit present.      Mental Status: She is alert.   Psychiatric:         Mood and Affect: Mood normal.         Behavior: Behavior normal.       Administrative Statements           "

## 2024-08-29 ENCOUNTER — TELEPHONE (OUTPATIENT)
Dept: FAMILY MEDICINE CLINIC | Facility: CLINIC | Age: 49
End: 2024-08-29

## 2024-08-29 DIAGNOSIS — R73.03 PRE-DIABETES: ICD-10-CM

## 2024-08-29 DIAGNOSIS — E66.01 CLASS 2 SEVERE OBESITY DUE TO EXCESS CALORIES WITH SERIOUS COMORBIDITY AND BODY MASS INDEX (BMI) OF 35.0 TO 35.9 IN ADULT (HCC): Primary | ICD-10-CM

## 2024-08-29 DIAGNOSIS — I10 HYPERTENSION, UNSPECIFIED TYPE: ICD-10-CM

## 2024-08-29 LAB
BASOPHILS # BLD AUTO: 0.15 THOUSANDS/ÂΜL (ref 0–0.1)
BASOPHILS NFR BLD AUTO: 3 % (ref 0–1)
EOSINOPHIL # BLD AUTO: 0.3 THOUSAND/ÂΜL (ref 0–0.61)
EOSINOPHIL NFR BLD AUTO: 5 % (ref 0–6)
ERYTHROCYTE [DISTWIDTH] IN BLOOD BY AUTOMATED COUNT: 18.6 % (ref 11.6–15.1)
FERRITIN SERPL-MCNC: 17 NG/ML (ref 11–307)
HCT VFR BLD AUTO: 36.2 % (ref 34.8–46.1)
HGB BLD-MCNC: 10.8 G/DL (ref 11.5–15.4)
IMM GRANULOCYTES # BLD AUTO: 0.01 THOUSAND/UL (ref 0–0.2)
IMM GRANULOCYTES NFR BLD AUTO: 0 % (ref 0–2)
LYMPHOCYTES # BLD AUTO: 1.5 THOUSANDS/ÂΜL (ref 0.6–4.47)
LYMPHOCYTES NFR BLD AUTO: 25 % (ref 14–44)
MCH RBC QN AUTO: 27.5 PG (ref 26.8–34.3)
MCHC RBC AUTO-ENTMCNC: 29.8 G/DL (ref 31.4–37.4)
MCV RBC AUTO: 92 FL (ref 82–98)
MONOCYTES # BLD AUTO: 0.56 THOUSAND/ÂΜL (ref 0.17–1.22)
MONOCYTES NFR BLD AUTO: 10 % (ref 4–12)
NEUTROPHILS # BLD AUTO: 3.38 THOUSANDS/ÂΜL (ref 1.85–7.62)
NEUTS SEG NFR BLD AUTO: 57 % (ref 43–75)
NRBC BLD AUTO-RTO: 0 /100 WBCS
PLATELET # BLD AUTO: 275 THOUSANDS/UL (ref 149–390)
PMV BLD AUTO: 11.7 FL (ref 8.9–12.7)
RBC # BLD AUTO: 3.93 MILLION/UL (ref 3.81–5.12)
TSH SERPL DL<=0.05 MIU/L-ACNC: 1.55 UIU/ML (ref 0.45–4.5)
WBC # BLD AUTO: 5.9 THOUSAND/UL (ref 4.31–10.16)

## 2024-08-29 NOTE — TELEPHONE ENCOUNTER
----- Message from Sharonda Cochran MD sent at 8/29/2024  2:02 PM EDT -----  Please advise pt that her A1c is 5.8, consistent with pre-diabetes. I don't think her trulicity will be covered since she is not diabetic, but I can try wegovy instead. Will send to pharmacy.

## 2024-08-30 ENCOUNTER — HOSPITAL ENCOUNTER (EMERGENCY)
Facility: HOSPITAL | Age: 49
Discharge: HOME | End: 2024-08-30
Attending: EMERGENCY MEDICINE
Payer: COMMERCIAL

## 2024-08-30 VITALS
RESPIRATION RATE: 20 BRPM | DIASTOLIC BLOOD PRESSURE: 63 MMHG | TEMPERATURE: 97.1 F | SYSTOLIC BLOOD PRESSURE: 110 MMHG | OXYGEN SATURATION: 98 % | HEART RATE: 87 BPM

## 2024-08-30 DIAGNOSIS — F10.929 ALCOHOL INTOXICATION (HCC): Primary | ICD-10-CM

## 2024-08-30 LAB — HCV RNA SERPL NAA+PROBE-ACNC: NOT DETECTED K[IU]/ML

## 2024-08-30 PROCEDURE — 99283 EMERGENCY DEPT VISIT LOW MDM: CPT | Performed by: EMERGENCY MEDICINE

## 2024-08-30 PROCEDURE — 99284 EMERGENCY DEPT VISIT MOD MDM: CPT

## 2024-08-30 NOTE — ED PROVIDER NOTES
History  Chief Complaint   Patient presents with    Alcohol Intoxication     Unable to say why she called EMS. Met them at the door and said she doesn't know why they were there.      49-year-old female history of alcoholism presents after drinking alcohol.  She has no medical complaints.      Alcohol Intoxication      Prior to Admission Medications   Prescriptions Last Dose Informant Patient Reported? Taking?   Semaglutide-Weight Management (WEGOVY) 0.25 MG/0.5ML   No No   Sig: Inject 0.5 mL (0.25 mg total) under the skin once a week for 28 days   Semaglutide-Weight Management (WEGOVY) 0.5 MG/0.5ML   No No   Sig: Inject 0.5 mL (0.5 mg total) under the skin once a week for 28 days Do not start before September 24, 2024.   albuterol (ProAir HFA) 90 mcg/act inhaler   No No   Sig: Inhale 2 puffs every 6 (six) hours as needed for wheezing or shortness of breath   folic acid (FOLVITE) 1 mg tablet   No No   Sig: Take 1 tablet (1 mg total) by mouth daily   furosemide (LASIX) 40 mg tablet   Yes No   glucose blood (OneTouch Ultra) test strip   No No   Sig: Use as instructed   metoclopramide (REGLAN) 5 mg tablet   No No   Sig: Take 1 pill at 2PM and 8PM the day prior to the procedure   metoprolol tartrate (LOPRESSOR) 50 mg tablet   No No   Sig: Take 1 tablet (50 mg total) by mouth every 12 (twelve) hours   mometasone-formoterol (Dulera) 100-5 MCG/ACT inhaler   No No   Sig: Inhale 2 puffs 2 (two) times a day Rinse mouth after use.   montelukast (SINGULAIR) 10 mg tablet   No No   Sig: Take 1 tablet (10 mg total) by mouth daily at bedtime   naltrexone (REVIA) 50 mg tablet   No No   Sig: Take 1 tablet (50 mg total) by mouth daily   nystatin (MYCOSTATIN) cream   No No   Sig: Apply topically 2 (two) times a day   omeprazole (PriLOSEC) 40 MG capsule   No No   Sig: Take 1 capsule (40 mg total) by mouth daily   polyethylene glycol (GOLYTELY) 4000 mL solution   No No   Sig: Take 4,000 mL by mouth once for 1 dose   potassium chloride  (Klor-Con) 10 mEq tablet   Yes No   Sig: Take 10 mEq by mouth 2 (two) times a day   vitamin B-12 (VITAMIN B-12) 1,000 mcg tablet   Yes No   Sig: Take by mouth daily      Facility-Administered Medications: None       Past Medical History:   Diagnosis Date    Alcohol abuse 1/4/2024    Bipolar disorder (HCC)     Diabetes mellitus (HCC)     Hypertension     Psychiatric disorder        History reviewed. No pertinent surgical history.    Family History   Problem Relation Age of Onset    Diabetes Mother     Diabetes Brother      I have reviewed and agree with the history as documented.    E-Cigarette/Vaping    E-Cigarette Use Never User      E-Cigarette/Vaping Substances    Nicotine No     THC No     CBD No     Flavoring No     Other No     Unknown No      Social History     Tobacco Use    Smoking status: Some Days     Current packs/day: 0.50     Average packs/day: 0.5 packs/day for 30.0 years (15.0 ttl pk-yrs)     Types: Cigarettes   Vaping Use    Vaping status: Never Used   Substance Use Topics    Alcohol use: Yes     Alcohol/week: 40.0 standard drinks of alcohol     Types: 40 Cans of beer per week     Comment: 8-12 beer per day    Drug use: Not Currently       Review of Systems    Physical Exam  Physical Exam  Vitals and nursing note reviewed.   Constitutional:       Appearance: Normal appearance. She is well-developed.   HENT:      Head: Normocephalic and atraumatic.   Eyes:      Conjunctiva/sclera: Conjunctivae normal.      Pupils: Pupils are equal, round, and reactive to light.   Neck:      Trachea: No tracheal deviation.   Cardiovascular:      Rate and Rhythm: Normal rate and regular rhythm.      Heart sounds: Normal heart sounds. No murmur heard.  Pulmonary:      Effort: Pulmonary effort is normal. No respiratory distress.      Breath sounds: Normal breath sounds. No wheezing or rales.   Abdominal:      General: Bowel sounds are normal. There is no distension.      Palpations: Abdomen is soft.      Tenderness:  There is no abdominal tenderness.   Musculoskeletal:         General: No deformity.      Cervical back: Normal range of motion and neck supple.   Skin:     General: Skin is warm and dry.      Capillary Refill: Capillary refill takes less than 2 seconds.   Neurological:      General: No focal deficit present.      Mental Status: She is alert and oriented to person, place, and time.      Sensory: No sensory deficit.   Psychiatric:         Mood and Affect: Mood normal.         Judgment: Judgment normal.         Vital Signs  ED Triage Vitals [08/30/24 0859]   Temperature Pulse Respirations Blood Pressure SpO2   (!) 97.1 °F (36.2 °C) 87 20 110/63 98 %      Temp Source Heart Rate Source Patient Position - Orthostatic VS BP Location FiO2 (%)   Temporal Monitor Lying Left arm --      Pain Score       No Pain           Vitals:    08/30/24 0859   BP: 110/63   Pulse: 87   Patient Position - Orthostatic VS: Lying         Visual Acuity      ED Medications  Medications - No data to display    Diagnostic Studies  Results Reviewed       None                   No orders to display              Procedures  Procedures         ED Course  ED Course as of 08/30/24 1430   Fri Aug 30, 2024   1044 Patient continues to have stable gait.  Currently clinically sober.  No slurred speech.  She is requesting be discharged.                                 SBIRT 22yo+      Flowsheet Row Most Recent Value   Initial Alcohol Screen: US AUDIT-C     1. How often do you have a drink containing alcohol? 6 Filed at: 08/30/2024 0904   2. How many drinks containing alcohol do you have on a typical day you are drinking?  3 Filed at: 08/30/2024 0904   3a. Male UNDER 65: How often do you have five or more drinks on one occasion? 0 Filed at: 08/30/2024 0904   3b. FEMALE Any Age, or MALE 65+: How often do you have 4 or more drinks on one occassion? 0 Filed at: 08/30/2024 0904   Audit-C Score 9 Filed at: 08/30/2024 0904   LUANN: How many times in the past year have  you...    Used an illegal drug or used a prescription medication for non-medical reasons? Never Filed at: 08/30/2024 0904                      Medical Decision Making  49-year-old female presents with no medical complaint.  Possibly minimally intoxicated however no slurred speech and a stable gait.  Patient requesting discharge.  I asked if patient would not mind staying an extra hour he took to make sure her symptoms do not worsen.    Patient was observed in the emergency department for approximately 2 hours and she remained stable and still requesting discharge.  No indication to hold patient against her will.  She is AO x 4 and has capacity make her own decisions.  Patient discharged without any medical complaints.  Patient appears to be clinically sober at the time of discharge.    Problems Addressed:  Alcohol intoxication (HCC): acute illness or injury                 Disposition  Final diagnoses:   Alcohol intoxication (HCC)     Time reflects when diagnosis was documented in both MDM as applicable and the Disposition within this note       Time User Action Codes Description Comment    8/30/2024 10:00 AM Cl Fajardo Add [F10.929] Alcohol intoxication (HCC)           ED Disposition       ED Disposition   Discharge    Condition   Stable    Date/Time   Fri Aug 30, 2024 0949    Comment   Kathleen Robles discharge to home/self care.                   Follow-up Information       Follow up With Specialties Details Why Contact Info    Sharonda Cochran MD Internal Medicine   [address] [place] [address]  [phone]              Discharge Medication List as of 8/30/2024 10:44 AM        CONTINUE these medications which have NOT CHANGED    Details   albuterol (ProAir HFA) 90 mcg/act inhaler Inhale 2 puffs every 6 (six) hours as needed for wheezing or shortness of breath, Starting Mon 1/8/2024, Normal      folic acid (FOLVITE) 1 mg tablet Take 1 tablet (1 mg total) by mouth daily, Starting Thu  5/23/2024, Normal      furosemide (LASIX) 40 mg tablet Historical Med      glucose blood (OneTouch Ultra) test strip Use as instructed, Normal      metoclopramide (REGLAN) 5 mg tablet Take 1 pill at 2PM and 8PM the day prior to the procedure, Normal      metoprolol tartrate (LOPRESSOR) 50 mg tablet Take 1 tablet (50 mg total) by mouth every 12 (twelve) hours, Starting Wed 2/28/2024, Normal      mometasone-formoterol (Dulera) 100-5 MCG/ACT inhaler Inhale 2 puffs 2 (two) times a day Rinse mouth after use., Starting Mon 1/8/2024, Normal      montelukast (SINGULAIR) 10 mg tablet Take 1 tablet (10 mg total) by mouth daily at bedtime, Starting Sat 7/6/2024, Normal      naltrexone (REVIA) 50 mg tablet Take 1 tablet (50 mg total) by mouth daily, Starting Wed 2/28/2024, Normal      nystatin (MYCOSTATIN) cream Apply topically 2 (two) times a day, Starting Wed 6/5/2024, Normal      omeprazole (PriLOSEC) 40 MG capsule Take 1 capsule (40 mg total) by mouth daily, Starting Mon 3/4/2024, Normal      polyethylene glycol (GOLYTELY) 4000 mL solution Take 4,000 mL by mouth once for 1 dose, Starting Mon 3/4/2024, Normal      potassium chloride (Klor-Con) 10 mEq tablet Take 10 mEq by mouth 2 (two) times a day, Historical Med      Semaglutide-Weight Management (WEGOVY) 0.25 MG/0.5ML Inject 0.5 mL (0.25 mg total) under the skin once a week for 28 days, Starting Thu 8/29/2024, Until Thu 9/26/2024, Normal      Semaglutide-Weight Management (WEGOVY) 0.5 MG/0.5ML Inject 0.5 mL (0.5 mg total) under the skin once a week for 28 days Do not start before September 24, 2024., Starting Tue 9/24/2024, Until Tue 10/22/2024, Normal      vitamin B-12 (VITAMIN B-12) 1,000 mcg tablet Take by mouth daily, Historical Med             No discharge procedures on file.    PDMP Review       None            ED Provider  Electronically Signed by             Cl Fajardo,   08/30/24 4913

## 2024-08-31 LAB — TTG IGA SER-ACNC: <2 U/ML (ref 0–3)

## 2024-09-03 ENCOUNTER — HOSPITAL ENCOUNTER (EMERGENCY)
Facility: HOSPITAL | Age: 49
Discharge: HOME | End: 2024-09-04
Attending: EMERGENCY MEDICINE
Payer: COMMERCIAL

## 2024-09-03 DIAGNOSIS — D50.9 IRON DEFICIENCY ANEMIA, UNSPECIFIED IRON DEFICIENCY ANEMIA TYPE: Primary | ICD-10-CM

## 2024-09-03 DIAGNOSIS — F10.929 ALCOHOL INTOXICATION (HCC): ICD-10-CM

## 2024-09-03 DIAGNOSIS — F32.A DEPRESSION: Primary | ICD-10-CM

## 2024-09-03 LAB
ALBUMIN SERPL BCG-MCNC: 4.1 G/DL (ref 3.5–5)
ALP SERPL-CCNC: 119 U/L (ref 34–104)
ALT SERPL W P-5'-P-CCNC: 13 U/L (ref 7–52)
AMPHETAMINES SERPL QL SCN: NEGATIVE
ANION GAP SERPL CALCULATED.3IONS-SCNC: 10 MMOL/L (ref 4–13)
AST SERPL W P-5'-P-CCNC: 17 U/L (ref 13–39)
BARBITURATES UR QL: NEGATIVE
BASOPHILS # BLD AUTO: 0.16 THOUSANDS/ÂΜL (ref 0–0.1)
BASOPHILS NFR BLD AUTO: 2 % (ref 0–1)
BENZODIAZ UR QL: NEGATIVE
BILIRUB SERPL-MCNC: 0.22 MG/DL (ref 0.2–1)
BUN SERPL-MCNC: 18 MG/DL (ref 5–25)
CALCIUM SERPL-MCNC: 8.8 MG/DL (ref 8.4–10.2)
CHLORIDE SERPL-SCNC: 102 MMOL/L (ref 96–108)
CO2 SERPL-SCNC: 24 MMOL/L (ref 21–32)
COCAINE UR QL: NEGATIVE
CREAT SERPL-MCNC: 1.16 MG/DL (ref 0.6–1.3)
EOSINOPHIL # BLD AUTO: 0.31 THOUSAND/ÂΜL (ref 0–0.61)
EOSINOPHIL NFR BLD AUTO: 4 % (ref 0–6)
ERYTHROCYTE [DISTWIDTH] IN BLOOD BY AUTOMATED COUNT: 18 % (ref 11.6–15.1)
ETHANOL SERPL-MCNC: 302 MG/DL
FENTANYL UR QL SCN: NEGATIVE
GFR SERPL CREATININE-BSD FRML MDRD: 54 ML/MIN/1.73SQ M
GLUCOSE SERPL-MCNC: 101 MG/DL (ref 65–140)
HCT VFR BLD AUTO: 36.1 % (ref 34.8–46.1)
HGB BLD-MCNC: 11.2 G/DL (ref 11.5–15.4)
HYDROCODONE UR QL SCN: NEGATIVE
IMM GRANULOCYTES # BLD AUTO: 0.04 THOUSAND/UL (ref 0–0.2)
IMM GRANULOCYTES NFR BLD AUTO: 1 % (ref 0–2)
LYMPHOCYTES # BLD AUTO: 4.24 THOUSANDS/ÂΜL (ref 0.6–4.47)
LYMPHOCYTES NFR BLD AUTO: 47 % (ref 14–44)
MCH RBC QN AUTO: 27.4 PG (ref 26.8–34.3)
MCHC RBC AUTO-ENTMCNC: 31 G/DL (ref 31.4–37.4)
MCV RBC AUTO: 88 FL (ref 82–98)
METHADONE UR QL: NEGATIVE
MONOCYTES # BLD AUTO: 0.72 THOUSAND/ÂΜL (ref 0.17–1.22)
MONOCYTES NFR BLD AUTO: 8 % (ref 4–12)
NEUTROPHILS # BLD AUTO: 3.37 THOUSANDS/ÂΜL (ref 1.85–7.62)
NEUTS SEG NFR BLD AUTO: 38 % (ref 43–75)
NRBC BLD AUTO-RTO: 0 /100 WBCS
OPIATES UR QL SCN: NEGATIVE
OXYCODONE+OXYMORPHONE UR QL SCN: NEGATIVE
PCP UR QL: NEGATIVE
PLATELET # BLD AUTO: 350 THOUSANDS/UL (ref 149–390)
PMV BLD AUTO: 10.1 FL (ref 8.9–12.7)
POTASSIUM SERPL-SCNC: 4 MMOL/L (ref 3.5–5.3)
PROT SERPL-MCNC: 7.2 G/DL (ref 6.4–8.4)
RBC # BLD AUTO: 4.09 MILLION/UL (ref 3.81–5.12)
SODIUM SERPL-SCNC: 136 MMOL/L (ref 135–147)
THC UR QL: NEGATIVE
TSH SERPL DL<=0.05 MIU/L-ACNC: 1.21 UIU/ML (ref 0.45–4.5)
WBC # BLD AUTO: 8.84 THOUSAND/UL (ref 4.31–10.16)

## 2024-09-03 PROCEDURE — 36415 COLL VENOUS BLD VENIPUNCTURE: CPT | Performed by: EMERGENCY MEDICINE

## 2024-09-03 PROCEDURE — 99285 EMERGENCY DEPT VISIT HI MDM: CPT | Performed by: EMERGENCY MEDICINE

## 2024-09-03 PROCEDURE — 80307 DRUG TEST PRSMV CHEM ANLYZR: CPT | Performed by: EMERGENCY MEDICINE

## 2024-09-03 PROCEDURE — 99285 EMERGENCY DEPT VISIT HI MDM: CPT

## 2024-09-03 PROCEDURE — 84443 ASSAY THYROID STIM HORMONE: CPT | Performed by: EMERGENCY MEDICINE

## 2024-09-03 PROCEDURE — 82077 ASSAY SPEC XCP UR&BREATH IA: CPT | Performed by: EMERGENCY MEDICINE

## 2024-09-03 PROCEDURE — 96372 THER/PROPH/DIAG INJ SC/IM: CPT

## 2024-09-03 PROCEDURE — 80053 COMPREHEN METABOLIC PANEL: CPT | Performed by: EMERGENCY MEDICINE

## 2024-09-03 PROCEDURE — 93005 ELECTROCARDIOGRAM TRACING: CPT

## 2024-09-03 PROCEDURE — 85025 COMPLETE CBC W/AUTO DIFF WBC: CPT | Performed by: EMERGENCY MEDICINE

## 2024-09-03 RX ORDER — FERROUS SULFATE 324(65)MG
324 TABLET, DELAYED RELEASE (ENTERIC COATED) ORAL
Qty: 30 TABLET | Refills: 5 | Status: SHIPPED | OUTPATIENT
Start: 2024-09-03

## 2024-09-03 RX ORDER — LANOLIN ALCOHOL/MO/W.PET/CERES
100 CREAM (GRAM) TOPICAL DAILY
Status: DISCONTINUED | OUTPATIENT
Start: 2024-09-04 | End: 2024-09-04 | Stop reason: HOSPADM

## 2024-09-03 RX ORDER — LORAZEPAM 2 MG/ML
1 INJECTION INTRAMUSCULAR ONCE
Status: COMPLETED | OUTPATIENT
Start: 2024-09-03 | End: 2024-09-03

## 2024-09-03 RX ORDER — FOLIC ACID 1 MG/1
1 TABLET ORAL DAILY
Status: DISCONTINUED | OUTPATIENT
Start: 2024-09-04 | End: 2024-09-04 | Stop reason: HOSPADM

## 2024-09-03 RX ORDER — MULTIVIT WITH MINERALS/LUTEIN
250 TABLET ORAL DAILY
Qty: 30 TABLET | Refills: 5 | Status: SHIPPED | OUTPATIENT
Start: 2024-09-03

## 2024-09-03 RX ADMIN — LORAZEPAM 1 MG: 2 INJECTION INTRAMUSCULAR; INTRAVENOUS at 21:28

## 2024-09-04 VITALS
RESPIRATION RATE: 18 BRPM | DIASTOLIC BLOOD PRESSURE: 95 MMHG | SYSTOLIC BLOOD PRESSURE: 132 MMHG | HEART RATE: 84 BPM | OXYGEN SATURATION: 99 % | TEMPERATURE: 98 F

## 2024-09-04 LAB
ATRIAL RATE: 86 BPM
ETHANOL EXG-MCNC: 0 MG/DL
ETHANOL SERPL-MCNC: 147 MG/DL
P AXIS: 59 DEGREES
PR INTERVAL: 170 MS
QRS AXIS: 51 DEGREES
QRSD INTERVAL: 84 MS
QT INTERVAL: 374 MS
QTC INTERVAL: 447 MS
T WAVE AXIS: 57 DEGREES
VENTRICULAR RATE: 86 BPM

## 2024-09-04 PROCEDURE — 82077 ASSAY SPEC XCP UR&BREATH IA: CPT | Performed by: FAMILY MEDICINE

## 2024-09-04 PROCEDURE — 36415 COLL VENOUS BLD VENIPUNCTURE: CPT | Performed by: FAMILY MEDICINE

## 2024-09-04 PROCEDURE — 93010 ELECTROCARDIOGRAM REPORT: CPT | Performed by: INTERNAL MEDICINE

## 2024-09-04 PROCEDURE — 82075 ASSAY OF BREATH ETHANOL: CPT | Performed by: EMERGENCY MEDICINE

## 2024-09-04 RX ADMIN — FOLIC ACID 1 MG: 1 TABLET ORAL at 09:31

## 2024-09-04 RX ADMIN — THIAMINE HCL TAB 100 MG 100 MG: 100 TAB at 09:31

## 2024-09-04 RX ADMIN — Medication 1 TABLET: at 09:31

## 2024-09-04 NOTE — ED NOTES
"Pt found to be crawling out of bed stating \"I am leaving\". I advised pt she was intoxicated and could not safely leave the ED. Pt continued to attempt to exit the bed stating she was leaving. Security and additional staff called to bedside for assist including provider. Pt assisted back into bed. Pt voiced she needed to use the restroom and was assisted there by two staff members.Upon walking back to room pt voiced she would be cooperative with staff.      Fabiola Farris RN  09/03/24 2228    "

## 2024-09-04 NOTE — ED PROVIDER NOTES
History  Chief Complaint   Patient presents with    Psychiatric Evaluation     Pt reports she was fighting with her mother today and called 911 voicing SI statements however pt stated she does not want to harm herself and only said that due to the argument. Pt also admits to alcohol consumption this evening.      HPI    49-year-old female presents the emergency department via EMS secondary to increased anxiety and a vague suicidal plan where she is going to stab her self with a knife after she became enraged after a argument with her mother.  Upon further questioning patient rescinded this threat and stated that she wanted to go home.  Patient is clearly intoxicated smells of alcohol on her breath.  Upon further review in the chart, noted patient has been seen in the multiple times for alcohol intoxication and multiple ERs.    Remaining 12 point review of systems unremarkable.    Prior to Admission Medications   Prescriptions Last Dose Informant Patient Reported? Taking?   Semaglutide-Weight Management (WEGOVY) 0.25 MG/0.5ML   No No   Sig: Inject 0.5 mL (0.25 mg total) under the skin once a week for 28 days   Semaglutide-Weight Management (WEGOVY) 0.5 MG/0.5ML   No No   Sig: Inject 0.5 mL (0.5 mg total) under the skin once a week for 28 days Do not start before September 24, 2024.   albuterol (ProAir HFA) 90 mcg/act inhaler   No No   Sig: Inhale 2 puffs every 6 (six) hours as needed for wheezing or shortness of breath   ascorbic acid (VITAMIN C) 250 mg tablet   No No   Sig: Take 1 tablet (250 mg total) by mouth daily   ferrous sulfate 324 (65 Fe) mg   No No   Sig: Take 1 tablet (324 mg total) by mouth daily before breakfast   folic acid (FOLVITE) 1 mg tablet   No No   Sig: Take 1 tablet (1 mg total) by mouth daily   furosemide (LASIX) 40 mg tablet   Yes No   glucose blood (OneTouch Ultra) test strip   No No   Sig: Use as instructed   metoclopramide (REGLAN) 5 mg tablet   No No   Sig: Take 1 pill at 2PM and 8PM the  day prior to the procedure   metoprolol tartrate (LOPRESSOR) 50 mg tablet   No No   Sig: Take 1 tablet (50 mg total) by mouth every 12 (twelve) hours   mometasone-formoterol (Dulera) 100-5 MCG/ACT inhaler   No No   Sig: Inhale 2 puffs 2 (two) times a day Rinse mouth after use.   montelukast (SINGULAIR) 10 mg tablet   No No   Sig: Take 1 tablet (10 mg total) by mouth daily at bedtime   naltrexone (REVIA) 50 mg tablet   No No   Sig: Take 1 tablet (50 mg total) by mouth daily   nystatin (MYCOSTATIN) cream   No No   Sig: Apply topically 2 (two) times a day   omeprazole (PriLOSEC) 40 MG capsule   No No   Sig: Take 1 capsule (40 mg total) by mouth daily   polyethylene glycol (GOLYTELY) 4000 mL solution   No No   Sig: Take 4,000 mL by mouth once for 1 dose   potassium chloride (Klor-Con) 10 mEq tablet   Yes No   Sig: Take 10 mEq by mouth 2 (two) times a day   vitamin B-12 (VITAMIN B-12) 1,000 mcg tablet   Yes No   Sig: Take by mouth daily      Facility-Administered Medications: None       Past Medical History:   Diagnosis Date    Alcohol abuse 1/4/2024    Bipolar disorder (HCC)     Diabetes mellitus (HCC)     Hypertension     Psychiatric disorder        History reviewed. No pertinent surgical history.    Family History   Problem Relation Age of Onset    Diabetes Mother     Diabetes Brother      I have reviewed and agree with the history as documented.    E-Cigarette/Vaping    E-Cigarette Use Never User      E-Cigarette/Vaping Substances    Nicotine No     THC No     CBD No     Flavoring No     Other No     Unknown No      Social History     Tobacco Use    Smoking status: Some Days     Current packs/day: 0.50     Average packs/day: 0.5 packs/day for 30.0 years (15.0 ttl pk-yrs)     Types: Cigarettes   Vaping Use    Vaping status: Never Used   Substance Use Topics    Alcohol use: Yes     Alcohol/week: 40.0 standard drinks of alcohol     Types: 40 Cans of beer per week     Comment: 8-12 beer per day    Drug use: Not Currently        Review of Systems   Constitutional: Negative.  Negative for chills, diaphoresis, fatigue and fever.   HENT: Negative.     Eyes: Negative.    Respiratory: Negative.  Negative for chest tightness, shortness of breath, wheezing and stridor.    Cardiovascular: Negative.  Negative for chest pain and leg swelling.   Gastrointestinal: Negative.  Negative for abdominal pain, nausea and vomiting.   Endocrine: Negative.    Genitourinary: Negative.  Negative for dysuria and hematuria.   Musculoskeletal: Negative.    Skin: Negative.    Allergic/Immunologic: Negative.    Neurological: Negative.    Hematological: Negative.    Psychiatric/Behavioral: Negative.         Physical Exam  Physical Exam  Vitals and nursing note reviewed.   Constitutional:       General: She is not in acute distress.     Appearance: Normal appearance. She is normal weight. She is not ill-appearing, toxic-appearing or diaphoretic.   HENT:      Head: Normocephalic and atraumatic.      Right Ear: Ear canal and external ear normal.      Left Ear: Ear canal and external ear normal.      Nose: Nose normal.      Mouth/Throat:      Mouth: Mucous membranes are moist.      Pharynx: Oropharynx is clear.   Eyes:      Extraocular Movements: Extraocular movements intact.      Conjunctiva/sclera: Conjunctivae normal.      Pupils: Pupils are equal, round, and reactive to light.   Neck:      Vascular: No carotid bruit.   Cardiovascular:      Rate and Rhythm: Normal rate and regular rhythm.      Pulses: Normal pulses.      Heart sounds: Normal heart sounds. No murmur heard.     No friction rub.   Pulmonary:      Effort: Pulmonary effort is normal. No respiratory distress.      Breath sounds: Normal breath sounds. No stridor. No wheezing, rhonchi or rales.   Chest:      Chest wall: No tenderness.   Abdominal:      General: Abdomen is flat. Bowel sounds are normal. There is no distension.      Palpations: There is no mass.   Musculoskeletal:         General: No  swelling, tenderness, deformity or signs of injury. Normal range of motion.      Cervical back: Normal range of motion and neck supple. No rigidity or tenderness.      Right lower leg: No edema.      Left lower leg: No edema.   Lymphadenopathy:      Cervical: No cervical adenopathy.   Skin:     General: Skin is warm.      Capillary Refill: Capillary refill takes less than 2 seconds.      Coloration: Skin is not jaundiced or pale.      Findings: No bruising, erythema, lesion or rash.   Neurological:      General: No focal deficit present.      Mental Status: She is alert and oriented to person, place, and time. Mental status is at baseline.      Cranial Nerves: No cranial nerve deficit.      Sensory: No sensory deficit.   Psychiatric:         Mood and Affect: Mood normal.         Behavior: Behavior normal.         Thought Content: Thought content normal.         Judgment: Judgment normal.         Vital Signs  ED Triage Vitals [09/03/24 2111]   Temperature Pulse Respirations Blood Pressure SpO2   98 °F (36.7 °C) 83 18 132/85 98 %      Temp Source Heart Rate Source Patient Position - Orthostatic VS BP Location FiO2 (%)   Tympanic Monitor -- Left arm --      Pain Score       No Pain           Vitals:    09/03/24 2111   BP: 132/85   Pulse: 83         Visual Acuity      ED Medications  Medications   thiamine tablet 100 mg (has no administration in time range)   folic acid (FOLVITE) tablet 1 mg (has no administration in time range)   multivitamin-minerals (CENTRUM) tablet 1 tablet (has no administration in time range)   LORazepam (ATIVAN) injection 1 mg (1 mg Intramuscular Given 9/3/24 2128)       Diagnostic Studies  Results Reviewed       Procedure Component Value Units Date/Time    TSH [[id number]]  (Normal) Collected: 09/03/24 2134    Lab Status: Final result Specimen: Blood from Arm, Right Updated: 09/03/24 2215     TSH 3RD GENERATON 1.212 uIU/mL     Comprehensive metabolic panel [[id number]]  (Abnormal) Collected:  09/03/24 2134    Lab Status: Final result Specimen: Blood from Arm, Right Updated: 09/03/24 2201     Sodium 136 mmol/L      Potassium 4.0 mmol/L      Chloride 102 mmol/L      CO2 24 mmol/L      ANION GAP 10 mmol/L      BUN 18 mg/dL      Creatinine 1.16 mg/dL      Glucose 101 mg/dL      Calcium 8.8 mg/dL      AST 17 U/L      ALT 13 U/L      Alkaline Phosphatase 119 U/L      Total Protein 7.2 g/dL      Albumin 4.1 g/dL      Total Bilirubin 0.22 mg/dL      eGFR 54 ml/min/1.73sq m     Narrative:      [employer] guidelines for Chronic Kidney Disease (CKD):     Stage 1 with normal or high GFR (GFR > 90 mL/min/1.73 square meters)    Stage 2 Mild CKD (GFR = 60-89 mL/min/1.73 square meters)    Stage 3A Moderate CKD (GFR = 45-59 mL/min/1.73 square meters)    Stage 3B Moderate CKD (GFR = 30-44 mL/min/1.73 square meters)    Stage 4 Severe CKD (GFR = 15-29 mL/min/1.73 square meters)    Stage 5 End Stage CKD (GFR <15 mL/min/1.73 square meters)  Note: GFR calculation is accurate only with a steady state creatinine    Ethanol [[id number]]  (Abnormal) Collected: 09/03/24 2134    Lab Status: Final result Specimen: Blood from Arm, Right Updated: 09/03/24 2200     Ethanol Lvl 302 mg/dL     Rapid drug screen, urine [[id number]]  (Normal) Collected: 09/03/24 2134    Lab Status: Final result Specimen: Urine, Clean Catch Updated: 09/03/24 2155     Amph/Meth UR Negative     Barbiturate Ur Negative     Benzodiazepine Urine Negative     Cocaine Urine Negative     Methadone Urine Negative     Opiate Urine Negative     PCP Ur Negative     THC Urine Negative     Oxycodone Urine Negative     Fentanyl Urine Negative     HYDROCODONE URINE Negative    Narrative:      FOR MEDICAL PURPOSES ONLY.   IF CONFIRMATION NEEDED PLEASE CONTACT THE LAB WITHIN 5 DAYS.    Drug Screen Cutoff Levels:  AMPHETAMINE/METHAMPHETAMINES  1000 ng/mL  BARBITURATES     200 ng/mL  BENZODIAZEPINES     200 ng/mL  COCAINE      300 ng/mL  METHADONE      300  ng/mL  OPIATES      300 ng/mL  PHENCYCLIDINE     25 ng/mL  THC       50 ng/mL  OXYCODONE      100 ng/mL  FENTANYL      5 ng/mL  HYDROCODONE     300 ng/mL    CBC and differential [[id number]]  (Abnormal) Collected: 09/03/24 2134    Lab Status: Final result Specimen: Blood from Arm, Right Updated: 09/03/24 2140     WBC 8.84 Thousand/uL      RBC 4.09 Million/uL      Hemoglobin 11.2 g/dL      Hematocrit 36.1 %      MCV 88 fL      MCH 27.4 pg      MCHC 31.0 g/dL      RDW 18.0 %      MPV 10.1 fL      Platelets 350 Thousands/uL      nRBC 0 /100 WBCs      Segmented % 38 %      Immature Grans % 1 %      Lymphocytes % 47 %      Monocytes % 8 %      Eosinophils Relative 4 %      Basophils Relative 2 %      Absolute Neutrophils 3.37 Thousands/µL      Absolute Immature Grans 0.04 Thousand/uL      Absolute Lymphocytes 4.24 Thousands/µL      Absolute Monocytes 0.72 Thousand/µL      Eosinophils Absolute 0.31 Thousand/µL      Basophils Absolute 0.16 Thousands/µL     POCT alcohol breath test [[id number]]     Lab Status: No result                    No orders to display              Procedures  ECG 12 Lead Documentation Only    Date/Time: 9/3/2024 9:37 PM    Performed by: Simón Jacinto III, DO  Authorized by: Simón Jacinto III, DO    Indications / Diagnosis:  ETOH intoxication  Comments:      Personally reviewed this EKG that was performed with the patient, baseline artifact noted, patient EKG was completed at September 3, 2024 at 9:37 PM and inter by me at 9:40 PM, sinus rhythm with a ventricular rate of 86 bpm, baseline artifact noted in leads III in leads I and otherwise no acute changes noted.    .[facility]           ED Course  ED Course as of 09/03/24 2356 Tue Sep 03, 2024   2117 Patient seen evaluated, orders placed, argument her mother, stated that she wanted to kill herself with a knife, + strong smell of ETOH on her breath.    Brief focused differential dx in this patient is as follows:  ETOH vs. Underlying  "bipolar disorder.   2122 Patient attempted to leave and get off the stretcher, pt visibly intoxicated.  Security in the room with patient.   2347 Patient signed out to Dr. Ruiz, pt will be clinically sober at 5:30 am, pending sobriety to determine if crisis needs to eval patient.                                               Medical Decision Making  Consumes alcohol on a regular basis been seen evaluated multiple times in multiple ERs for alcohol intoxication, patient presents with vague suicidal threats but then retracted those statements immediately on further returning, patient placed on CIWA and a one-to-one observation, signed out to DR. Ruiz.    Portions of the record may have been created with voice recognition software. Occasional wrong word or \"sound a like\" substitutions may have occurred due to the inherent limitations of voice recognition software. Read the chart carefully and recognize, using context, where substitutions have occurred.       Amount and/or Complexity of Data Reviewed  Labs: ordered.    Risk  OTC drugs.  Prescription drug management.                 Disposition  Final diagnoses:   Depression   Alcohol intoxication (HCC)     Time reflects when diagnosis was documented in both MDM as applicable and the Disposition within this note       Time User Action Codes Description Comment    9/3/2024 11:32 PM Simón Jacinto Add [F32.A] Depression     9/3/2024 11:33 PM Simón Jacinto Add [F10.929] Alcohol intoxication (HCC)           ED Disposition       None          Follow-up Information    None         Patient's Medications   Discharge Prescriptions    No medications on file       No discharge procedures on file.    PDMP Review       None            ED Provider  Electronically Signed by             Simón Jacinto III, DO  09/03/24 8254    "

## 2024-09-04 NOTE — PROGRESS NOTES
" Pastoral Care Progress Note    2024  Patient: Kathleen Robles : 1975  Admission Date & Time: 9/3/2024 2108  MRN: [id number] [facility]: [id number]    CH initiated support visit to pt. Pt received CH upright in her bed, 1:1 present.  Pt shared about her social structure: she lives with her mother, SO, brother and cousin. Pt and SO have been together for 11 years. Pt has 2 children, and is in contact with one.     Pt shared that this household receives food stamp money that is shared between some of them. Pt attests no one in the house drives, but rides are provided by a cousin when necessary. 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    Pt shared that she has not taken her diabetes medication or medicine for her \"bipolar\" in a long time. Pt shared that there is family pressure for her to sustain a sober lifestyle, but she does relapse frequently.    Pt shared that she is interested in outpatient counseling, but does not have transportation to attend.    CH provided empathetic listening and support.             Chaplaincy Interventions Utilized:   Empowerment: Encouraged self-care    Exploration: Explored relational needs & resources    Relationship Building: Listened empathically     24 0900   Clinical Encounter Type   Visited With Patient       "

## 2024-09-04 NOTE — ED NOTES
Patient screened upon arrival using [place] Suicide Risk Assessment with result of LOW  Re-screening not required unless change in behavior or suicidal ideation.  Patient's environment appears to be free of unnecessary equipment/cords, and other objects commonly identified for self harm or harm to others.  Behavioral Health Assessment deferred as patient is sleeping and would benefit from additional rest.  Vital signs deferred until patient awake, no signs or symptoms of respiratory distress at this time.    Once patient is awake and able to participate, will complete assessments.  Will continue to monitor patient until Crisis and the Care team can make appropriate disposition and/or transfer/admission accommodations.       Eva Paris, WHITLEY  09/04/24 0674

## 2024-09-04 NOTE — ED NOTES
Patient screened upon arrival using [place] Suicide Risk Assessment with result of low   Re-screening not required unless change in behavior or suicidal ideation.  Patient's environment appears to be free of unnecessary equipment/cords, and other objects commonly identified for self harm or harm to others.  Behavioral Health Assessment deferred as patient is sleeping and would benefit from additional rest.  Vital signs deferred until patient awake, no signs or symptoms of respiratory distress at this time.    Once patient is awake and able to participate, will complete assessments.  Will continue to monitor patient until Crisis and the Care team can make appropriate disposition and/or transfer/admission accommodations.       Fabiola Farris RN  09/04/24 6395

## 2024-09-04 NOTE — ED NOTES
"Met with patient and completed the crisis intake assessment as well as the safety risk assessment.     Patient arrived to the ER via EMS from home. Patient was intoxicated upon arrival. Patient now medically cleared. Patient at time of assessment appears calm and cooperative. Patient maintained eye contact and speech was within normal limits. Patient denies SI/HI as well as hallucinations. \" I got in a fight with my mom last night, I was drinking and said stupid shit.\" Patient states she did not mean it. \" Im her caretaker, I get overwhelmed.\" Patient reports stable sleep, appetite, concentration, motivation.     Patient has not current providers and is followed by her PCP. Patient is open to outpatient resources/referrals.    Case reviewed with ER MD, patient can be discharged home with outpatient resources.     Patient educated if symptoms continue or worsen to call 911 or go to the nearest ER and is in agreement to do as such.   "

## 2024-09-04 NOTE — ED NOTES
This writer discussed the patients current presentation and recommended discharge plan with .  They agree with the patient being discharged at this time with referrals and/or information about outpatient mental health and D&A services.     The patient was Instructed to follow up with their PCP.    The patient was provided with referral information for: outpatient mental health services and D&A services.     This writer and the patient completed a safety plan.  The patient was provided with a copy of their safety plan with encouragement to utilize the plan following discharge.     In addition, the patient was instructed to call [facility] crisis, other crisis services, [facility] or to go to the nearest ER immediately if their situation changes at any time.     This writer discussed discharge plans with the patient, who agrees with and understands the discharge plans.         SAFETY PLAN  Warning Signs (thoughts, images, mood, behavior, situations) of a potential crisis: increase in depression and anxiety.       Coping Skills (what can I do to take my mind off the problem, or to keep myself safe): go for a walk and/or watch tv.       Outside Support (who can I reach out to for support and help): mother and boyfriend         National Suicide Prevention Hotline:  988      [facility] [phone] - Crisis   [facility] [phone] - LVF Crisis/Mobile Crisis   [phone] - SLPF Crisis   [facility]: [phone]  [facility]: [phone]   [facility] [phone] - Crisis   [facility] [phone] - Crisis     [facility] [phone] - Crisis   [facility] [phone] - Crisis   [phone] - Peer Support Talk Line (1-9pm daily)  [phone] - Teen Support Talk Line (1-9pm daily)  [phone] - [facility]   [facility] [phone]- Crisis    [facility] [phone] - Crisis   [facility] [phone] - Crisis    [facility]) [phone] - [facility] Crisis

## 2024-09-04 NOTE — DISCHARGE INSTRUCTIONS
This writer discussed the patients current presentation and recommended discharge plan with .  They agree with the patient being discharged at this time with referrals and/or information about outpatient mental health and D&A services.     The patient was Instructed to follow up with their PCP.    The patient was provided with referral information for: outpatient mental health services and D&A services.     This writer and the patient completed a safety plan.  The patient was provided with a copy of their safety plan with encouragement to utilize the plan following discharge.     In addition, the patient was instructed to call [facility] crisis, other crisis services, [facility] or to go to the nearest ER immediately if their situation changes at any time.     This writer discussed discharge plans with the patient, who agrees with and understands the discharge plans.         SAFETY PLAN  Warning Signs (thoughts, images, mood, behavior, situations) of a potential crisis: increase in depression and anxiety.       Coping Skills (what can I do to take my mind off the problem, or to keep myself safe): go for a walk and/or watch tv.       Outside Support (who can I reach out to for support and help): mother and boyfriend         National Suicide Prevention Hotline:  988      [facility] [phone] - Crisis   [facility] [phone] - LVF Crisis/Mobile Crisis   [phone] - SLPF Crisis   [facility]: [phone]  [facility]: [phone]   [facility] [phone] - Crisis   [facility] [phone] - Crisis     [facility] [phone] - Crisis   [facility] [phone] - Crisis   [phone] - Peer Support Talk Line (1-9pm daily)  [phone] - Teen Support Talk Line (1-9pm daily)  [phone] - [facility]   [facility] [phone]- Crisis    [facility] [phone] - Crisis   [facility] [phone] - Crisis    [facility]) [phone] - [facility] Crisis

## 2024-10-07 ENCOUNTER — TELEPHONE (OUTPATIENT)
Dept: FAMILY MEDICINE CLINIC | Facility: CLINIC | Age: 49
End: 2024-10-07

## 2024-10-07 NOTE — TELEPHONE ENCOUNTER
Pt was transferred from the pod    She sounds very upset on the phone    She has an appt on Friday, but would like to come in sooner she is having trouble with her bowels she said when she goes #2 is burns a lot , she said it has been going on for awhile now

## 2024-10-07 NOTE — TELEPHONE ENCOUNTER
Left message for pt to call back to schedule sooner appt.     Please transfer to me when she calls back.

## 2024-10-08 NOTE — TELEPHONE ENCOUNTER
Pt returned call. No more same days avail today.     She states it burns when she has a bowel movement. Started a couple of weeks ago. Denies any loose stools or constipation. Also denies blood in stool.     She would like to be seen sooner than Friday.

## 2024-10-09 ENCOUNTER — OFFICE VISIT (OUTPATIENT)
Dept: FAMILY MEDICINE CLINIC | Facility: CLINIC | Age: 49
End: 2024-10-09
Payer: COMMERCIAL

## 2024-10-09 ENCOUNTER — TELEPHONE (OUTPATIENT)
Age: 49
End: 2024-10-09

## 2024-10-09 VITALS
HEIGHT: 69 IN | RESPIRATION RATE: 18 BRPM | WEIGHT: 233 LBS | BODY MASS INDEX: 34.51 KG/M2 | HEART RATE: 106 BPM | OXYGEN SATURATION: 97 % | SYSTOLIC BLOOD PRESSURE: 128 MMHG | DIASTOLIC BLOOD PRESSURE: 80 MMHG | TEMPERATURE: 97.2 F

## 2024-10-09 DIAGNOSIS — K62.89 RECTAL PAIN: Primary | ICD-10-CM

## 2024-10-09 DIAGNOSIS — B37.89 CANDIDIASIS OF BREAST: ICD-10-CM

## 2024-10-09 DIAGNOSIS — K60.2 ANAL FISSURE: ICD-10-CM

## 2024-10-09 DIAGNOSIS — R19.7 DIARRHEA, UNSPECIFIED TYPE: ICD-10-CM

## 2024-10-09 PROCEDURE — 99213 OFFICE O/P EST LOW 20 MIN: CPT | Performed by: INTERNAL MEDICINE

## 2024-10-09 RX ORDER — NYSTATIN 100000 U/G
CREAM TOPICAL 2 TIMES DAILY
Qty: 30 G | Refills: 1 | Status: SHIPPED | OUTPATIENT
Start: 2024-10-09

## 2024-10-09 NOTE — TELEPHONE ENCOUNTER
Prescription sent over yesterday for lidocaine is for compounding and they no longer do compounding.  Will have to be sent elsewhere.

## 2024-10-09 NOTE — PROGRESS NOTES
Ambulatory Visit  Name: Kathleen Robles      : 1975      MRN: [id number]  Encounter Provider: Sharonda Cochran MD  Encounter Date: 10/9/2024   Encounter department: [facility] PRIMARY CARE    Assessment & Plan  Rectal pain    Orders:    NIFEdipine 0.3%-lidocaine 5% rectal ointment; Apply 1 Application topically every 6 (six) hours as needed for discomfort or pain for up to 3 days Apply a small amount to anal fissure    Diarrhea, unspecified type         Anal fissure    Orders:    NIFEdipine 0.3%-lidocaine 5% rectal ointment; Apply 1 Application topically every 6 (six) hours as needed for discomfort or pain for up to 3 days Apply a small amount to anal fissure    Candidiasis of breast    Orders:    nystatin (MYCOSTATIN) cream; Apply topically 2 (two) times a day       History of Present Illness     50yo female here for acute visit due to rectal pain. Reports issues with chronic diarrhea but now burning with bowel movements for past month. No fever or abdominal pain. Missed several GI appointments.           Review of Systems   Constitutional:  Negative for appetite change and chills.   Gastrointestinal:  Positive for diarrhea and rectal pain. Negative for abdominal pain, anal bleeding, nausea and vomiting.     Current Outpatient Medications on File Prior to Visit   Medication Sig Dispense Refill    albuterol (ProAir HFA) 90 mcg/act inhaler Inhale 2 puffs every 6 (six) hours as needed for wheezing or shortness of breath 18 g 3    ascorbic acid (VITAMIN C) 250 mg tablet Take 1 tablet (250 mg total) by mouth daily 30 tablet 5    ferrous sulfate 324 (65 Fe) mg Take 1 tablet (324 mg total) by mouth daily before breakfast 30 tablet 5    folic acid (FOLVITE) 1 mg tablet Take 1 tablet (1 mg total) by mouth daily 90 tablet 0    furosemide (LASIX) 40 mg tablet       glucose blood (OneTouch Ultra) test strip Use as instructed 100 each 1    metoclopramide (REGLAN) 5 mg tablet Take 1 pill at 2PM and 8PM the  "day prior to the procedure 2 tablet 0    metoprolol tartrate (LOPRESSOR) 50 mg tablet Take 1 tablet (50 mg total) by mouth every 12 (twelve) hours 180 tablet 1    mometasone-formoterol (Dulera) 100-5 MCG/ACT inhaler Inhale 2 puffs 2 (two) times a day Rinse mouth after use. 13 g 3    montelukast (SINGULAIR) 10 mg tablet Take 1 tablet (10 mg total) by mouth daily at bedtime 90 tablet 1    naltrexone (REVIA) 50 mg tablet Take 1 tablet (50 mg total) by mouth daily 90 tablet 0    nystatin (MYCOSTATIN) cream Apply topically 2 (two) times a day 30 g 1    omeprazole (PriLOSEC) 40 MG capsule Take 1 capsule (40 mg total) by mouth daily 90 capsule 1    potassium chloride (Klor-Con) 10 mEq tablet Take 10 mEq by mouth 2 (two) times a day      Semaglutide-Weight Management (WEGOVY) 0.5 MG/0.5ML Inject 0.5 mL (0.5 mg total) under the skin once a week for 28 days Do not start before September 24, 2024. 2 mL 0    vitamin B-12 (VITAMIN B-12) 1,000 mcg tablet Take by mouth daily      polyethylene glycol (GOLYTELY) 4000 mL solution Take 4,000 mL by mouth once for 1 dose 4000 mL 0     No current facility-administered medications on file prior to visit.          Objective     /80   Pulse (!) 106   Temp (!) 97.2 °F (36.2 °C) (Temporal)   Resp 18   Ht 5' 9\" (1.753 m)   Wt 106 kg (233 lb)   SpO2 97%   BMI 34.41 kg/m²     Physical Exam  Vitals reviewed. Exam conducted with a chaperone present.   Constitutional:       General: She is not in acute distress.     Appearance: She is obese.   Genitourinary:     Rectum: Tenderness and anal fissure present. No mass or external hemorrhoid. Normal anal tone.   Neurological:      General: No focal deficit present.      Mental Status: She is alert.   Psychiatric:         Mood and Affect: Mood and affect normal.         Behavior: Behavior normal. Behavior is cooperative.         "

## 2024-10-14 ENCOUNTER — NURSE TRIAGE (OUTPATIENT)
Age: 49
End: 2024-10-14

## 2024-10-14 ENCOUNTER — TELEPHONE (OUTPATIENT)
Age: 49
End: 2024-10-14

## 2024-10-14 NOTE — TELEPHONE ENCOUNTER
It looks like she was seen for alcohol intoxication but I don;'t see any abdominal imaging. I will order CT, but also recommend GI follow up since [facility]. I sent cream last week for anal fissure. Is she using it?

## 2024-10-14 NOTE — TELEPHONE ENCOUNTER
Regarding: burning on her buttocks  ----- Message from Catrina PRATER sent at 10/14/2024  8:04 AM EDT -----  Pt was seen in the ER yesterday for rt side abdomen pain was unable to poop until pt reach home now pt is buttocks is burning every time pt poop no available appt with providers at the office.[phone].

## 2024-10-14 NOTE — TELEPHONE ENCOUNTER
Triage nurse made a 3rd attempt to call and pt answered and hung up. PCP please follow up with patient.

## 2024-10-16 NOTE — TELEPHONE ENCOUNTER
3rd attempt at contacting patient. Left message requesting she call the office back. Marking as complete until we hear back from patient.

## 2024-11-01 ENCOUNTER — OFFICE VISIT (OUTPATIENT)
Dept: FAMILY MEDICINE CLINIC | Facility: CLINIC | Age: 49
End: 2024-11-01
Payer: COMMERCIAL

## 2024-11-01 ENCOUNTER — TELEPHONE (OUTPATIENT)
Age: 49
End: 2024-11-01

## 2024-11-01 VITALS
BODY MASS INDEX: 33.97 KG/M2 | RESPIRATION RATE: 16 BRPM | DIASTOLIC BLOOD PRESSURE: 82 MMHG | OXYGEN SATURATION: 98 % | SYSTOLIC BLOOD PRESSURE: 126 MMHG | WEIGHT: 230 LBS | TEMPERATURE: 97.8 F | HEART RATE: 92 BPM

## 2024-11-01 DIAGNOSIS — R21 RASH: Primary | ICD-10-CM

## 2024-11-01 DIAGNOSIS — T78.40XS ALLERGY, SEQUELA: ICD-10-CM

## 2024-11-01 DIAGNOSIS — J45.20 MILD INTERMITTENT ASTHMA WITHOUT COMPLICATION: ICD-10-CM

## 2024-11-01 DIAGNOSIS — R46.0 POOR HYGIENE: ICD-10-CM

## 2024-11-01 PROCEDURE — 99213 OFFICE O/P EST LOW 20 MIN: CPT | Performed by: NURSE PRACTITIONER

## 2024-11-01 RX ORDER — MONTELUKAST SODIUM 10 MG/1
10 TABLET ORAL
Qty: 90 TABLET | Refills: 1 | Status: SHIPPED | OUTPATIENT
Start: 2024-11-01

## 2024-11-01 RX ORDER — LORATADINE 10 MG/1
10 TABLET ORAL DAILY
Qty: 90 TABLET | Refills: 1 | Status: SHIPPED | OUTPATIENT
Start: 2024-11-01

## 2024-11-01 NOTE — TELEPHONE ENCOUNTER
Pt c/o itchy rash on back especially at night.  Pt requesting medication.  Advised pt she should be seen for an appointment. Pt accepted an appointment today in the office with Justine Isaacs. No openings with Dr. Cochran. Pt's appt today is scheduled for 10 am.

## 2024-11-01 NOTE — PROGRESS NOTES
Ambulatory Visit  Name: Kathleen Robles      : 1975      MRN: [id number]  Encounter Provider: CASSIA Ann  Encounter Date: 2024   Encounter department: [facility] PRIMARY CARE    Assessment & Plan  Rash    Orders:    loratadine (CLARITIN) 10 mg tablet; Take 1 tablet (10 mg total) by mouth daily    Allergy, sequela  Continue singulair at hs and claritin        Poor hygiene  Could benefit from showering or bathing with mild soap twice daily until rash heals           History of Present Illness     Patient presents for rash, states she is not sure how long it has been there, not using any otc meds or creams. Felt it may be from detergent but is very localized to head, chest neck and upper arms. No new medications added. No other close contacts with rash.             Review of Systems   Constitutional:  Negative for chills and fever.   HENT:  Negative for ear pain and sore throat.    Eyes:  Negative for pain and visual disturbance.   Respiratory:  Negative for cough and shortness of breath.    Cardiovascular:  Negative for chest pain and palpitations.   Gastrointestinal:  Negative for abdominal pain and vomiting.   Genitourinary:  Negative for dysuria and hematuria.   Musculoskeletal:  Negative for arthralgias and back pain.   Skin:  Positive for rash. Negative for color change.   Neurological:  Negative for seizures and syncope.   All other systems reviewed and are negative.          Objective     /82   Pulse 92   Temp 97.8 °F (36.6 °C) (Tympanic)   Resp 16   Wt 104 kg (230 lb)   SpO2 98%   BMI 33.97 kg/m²     Physical Exam  Vitals and nursing note reviewed.   Constitutional:       General: She is not in acute distress.     Appearance: She is well-developed.   HENT:      Head: Normocephalic and atraumatic.   Eyes:      Conjunctiva/sclera: Conjunctivae normal.   Cardiovascular:      Rate and Rhythm: Normal rate and regular rhythm.      Heart sounds: No murmur  heard.  Pulmonary:      Effort: Pulmonary effort is normal. No respiratory distress.      Breath sounds: Normal breath sounds.   Abdominal:      Tenderness: There is no abdominal tenderness.   Musculoskeletal:         General: No swelling.   Skin:     General: Skin is warm and dry.      Capillary Refill: Capillary refill takes less than 2 seconds.      Findings: Rash present.      Comments: Red areas in various stages of healing to the upper shoulder regions, chest and upper arms. Patient with itching throughout visit.    Neurological:      Mental Status: She is alert. Mental status is at baseline.   Psychiatric:         Mood and Affect: Mood normal.

## 2024-11-19 DIAGNOSIS — R73.03 PRE-DIABETES: ICD-10-CM

## 2024-11-19 DIAGNOSIS — E66.812 CLASS 2 SEVERE OBESITY DUE TO EXCESS CALORIES WITH SERIOUS COMORBIDITY AND BODY MASS INDEX (BMI) OF 35.0 TO 35.9 IN ADULT (HCC): Primary | ICD-10-CM

## 2024-11-19 DIAGNOSIS — I10 HYPERTENSION, UNSPECIFIED TYPE: ICD-10-CM

## 2024-11-19 DIAGNOSIS — E66.01 CLASS 2 SEVERE OBESITY DUE TO EXCESS CALORIES WITH SERIOUS COMORBIDITY AND BODY MASS INDEX (BMI) OF 35.0 TO 35.9 IN ADULT (HCC): Primary | ICD-10-CM

## 2024-11-20 ENCOUNTER — TELEPHONE (OUTPATIENT)
Dept: FAMILY MEDICINE CLINIC | Facility: CLINIC | Age: 49
End: 2024-11-20

## 2024-11-20 NOTE — TELEPHONE ENCOUNTER
Received notification that patient requires prior authorization for medication Semaglutide-Weight Management (WEGOVY) 0.25 MG/0.5ML

## 2024-11-20 NOTE — TELEPHONE ENCOUNTER
PA for Wegovy 0.25 mg  DENIED    Reason:(Screenshot if applicable)        Message sent to office clinical pool Yes    Denial letter scanned into Media Yes    Appeal started No (Provider will need to decide if appeal is warranted and send clinical documentation to Prior Authorization Team for initiation.)    **Please follow up with your patient regarding denial and next steps**

## 2024-11-20 NOTE — TELEPHONE ENCOUNTER
PA for WEGOVY) 0.25 MG/0.5ML SUBMITTED to [employer]    via    []CMM-KEY:   []Surescripts-Case ID #   []Availity-Auth ID # NDC #   []Faxed to plan   [x]Other website PromptPA [employer] - ID [id number]  []Phone call Case ID #     [x]PA sent as URGENT    All office notes, labs and other pertaining documents and studies sent. Clinical questions answered. Awaiting determination from insurance company.     Turnaround time for your insurance to make a decision on your Prior Authorization can take 7-21 business days.

## 2024-11-26 ENCOUNTER — OFFICE VISIT (OUTPATIENT)
Dept: URGENT CARE | Facility: CLINIC | Age: 49
End: 2024-11-26
Payer: COMMERCIAL

## 2024-11-26 VITALS
HEART RATE: 97 BPM | OXYGEN SATURATION: 97 % | DIASTOLIC BLOOD PRESSURE: 67 MMHG | BODY MASS INDEX: 35.59 KG/M2 | SYSTOLIC BLOOD PRESSURE: 137 MMHG | WEIGHT: 241 LBS | RESPIRATION RATE: 16 BRPM | TEMPERATURE: 98.7 F

## 2024-11-26 DIAGNOSIS — M25.552 BILATERAL HIP PAIN: ICD-10-CM

## 2024-11-26 DIAGNOSIS — R25.2 BILATERAL LEG CRAMPS: ICD-10-CM

## 2024-11-26 DIAGNOSIS — M25.512 ACUTE PAIN OF LEFT SHOULDER: Primary | ICD-10-CM

## 2024-11-26 DIAGNOSIS — M25.551 BILATERAL HIP PAIN: ICD-10-CM

## 2024-11-26 DIAGNOSIS — B37.2 SKIN YEAST INFECTION: ICD-10-CM

## 2024-11-26 PROCEDURE — 99204 OFFICE O/P NEW MOD 45 MIN: CPT | Performed by: NURSE PRACTITIONER

## 2024-11-26 PROCEDURE — S9088 SERVICES PROVIDED IN URGENT: HCPCS | Performed by: NURSE PRACTITIONER

## 2024-11-26 RX ORDER — SUCRALFATE 1 G/1
1 TABLET ORAL 4 TIMES DAILY PRN
COMMUNITY
Start: 2024-09-19

## 2024-11-26 RX ORDER — NYSTATIN 100000 [USP'U]/G
POWDER TOPICAL 3 TIMES DAILY
Qty: 180 G | Refills: 1 | Status: SHIPPED | OUTPATIENT
Start: 2024-11-26

## 2024-11-26 NOTE — PROGRESS NOTES
[facility]        NAME: Kathleen Robles is a 49 y.o. female  : 1975    MRN: [id number]  DATE: 2024  TIME: 5:08 PM      Assessment and Plan     Acute pain of left shoulder [M25.512]  1. Acute pain of left shoulder  Diclofenac Sodium (Voltaren) 1 %      2. Bilateral hip pain  Diclofenac Sodium (Voltaren) 1 %      3. Bilateral leg cramps  Diclofenac Sodium (Voltaren) 1 %      4. Skin yeast infection  nystatin (MYCOSTATIN) powder            Patient Instructions     Patient Instructions   The rash under your breasts is a fungal infection.  Use the antifungal powder or cream 2-3x/day until clear for 2-3 days.  Use fresh towels and clothes every day.    For the joint pain, you may use the voltaren/diclofenac gel up to 4x/day.  This is a topical anti-inflammatory and is the safest option.  Also try the stretches at home as tolerated.    Oral anti-inflammatories combined with alcohol increase risk of stomach bleeding.  Tylenol can irritate your liver.    Follow up with PCP in 3-5 days.  Proceed to  ER if symptoms worsen.    Chief Complaint     Chief Complaint   Patient presents with    Shoulder Pain    Rash    Back Pain    Muscle Pain         History of Present Illness     Patient presents accompanied by her mom.  While she confirms she is not taking most of her vitamin/mineral supplements, she expresses that she is not sure which prescription medicine she is ordered.  She acknowledges that she is still struggling with her alcohol use (history alcohol abuse--multiple recent ER visits).  She denies using any other substances.  She notes that she has been having some joint pain off and on--left shoulder, low back, both hips and sometimes muscle cramps especially of both calves.  No overt injuries.  No treatments tried.  She also shares that she has a recurrent rash under her breasts.  She has nystatin ointment ordered; she states she uses this sometimes when the skin gets really red.  Discussed  that fungal infections take consistent use 2-3 times a day until skin is completely clear for 2 or 3 days.        Review of Systems     Review of Systems   Musculoskeletal:  Positive for arthralgias and back pain.   Skin:  Positive for rash.   All other systems reviewed and are negative.        Current Medications       Current Outpatient Medications:     Diclofenac Sodium (Voltaren) 1 %, Apply 2 g topically 4 (four) times a day, Disp: 600 g, Rfl: 1    loratadine (CLARITIN) 10 mg tablet, Take 1 tablet (10 mg total) by mouth daily, Disp: 90 tablet, Rfl: 1    metoprolol tartrate (LOPRESSOR) 50 mg tablet, Take 1 tablet (50 mg total) by mouth every 12 (twelve) hours, Disp: 180 tablet, Rfl: 1    mometasone-formoterol (Dulera) 100-5 MCG/ACT inhaler, Inhale 2 puffs 2 (two) times a day Rinse mouth after use., Disp: 13 g, Rfl: 3    nystatin (MYCOSTATIN) cream, Apply topically 2 (two) times a day, Disp: 30 g, Rfl: 1    nystatin (MYCOSTATIN) powder, Apply topically 3 (three) times a day Until clear for 2-3 days, Disp: 180 g, Rfl: 1    sucralfate (CARAFATE) 1 g tablet, Take 1 g by mouth 4 (four) times a day as needed, Disp: , Rfl:     albuterol (ProAir HFA) 90 mcg/act inhaler, Inhale 2 puffs every 6 (six) hours as needed for wheezing or shortness of breath, Disp: 18 g, Rfl: 3    glucose blood (OneTouch Ultra) test strip, Use as instructed, Disp: 100 each, Rfl: 1    montelukast (SINGULAIR) 10 mg tablet, Take 1 tablet (10 mg total) by mouth daily at bedtime, Disp: 90 tablet, Rfl: 1    naltrexone (REVIA) 50 mg tablet, Take 1 tablet (50 mg total) by mouth daily, Disp: 90 tablet, Rfl: 0    omeprazole (PriLOSEC) 40 MG capsule, Take 1 capsule (40 mg total) by mouth daily, Disp: 90 capsule, Rfl: 1    polyethylene glycol (GOLYTELY) 4000 mL solution, Take 4,000 mL by mouth once for 1 dose, Disp: 4000 mL, Rfl: 0    Semaglutide-Weight Management (WEGOVY) 0.25 MG/0.5ML, Inject 0.5 mL (0.25 mg total) under the skin once a week for 28 days,  Disp: 2 mL, Rfl: 0    Current Allergies     Allergies as of 11/26/2024 - Reviewed 11/26/2024   Allergen Reaction Noted    Pollen extract Eye Swelling 01/07/2023              The following portions of the patient's history were reviewed and updated as appropriate: allergies, current medications, past family history, past medical history, past social history, past surgical history and problem list.     Past Medical History:   Diagnosis Date    Alcohol abuse 1/4/2024    Bipolar disorder (HCC)     Diabetes mellitus (HCC)     Hypertension     Psychiatric disorder        History reviewed. No pertinent surgical history.    Family History   Problem Relation Age of Onset    Diabetes Mother     Diabetes Brother          Medications have been verified.        Objective     /67   Pulse 97   Temp 98.7 °F (37.1 °C)   Resp 16   Wt 109 kg (241 lb)   SpO2 97%   BMI 35.59 kg/m²   No LMP recorded. Patient is postmenopausal.         Physical Exam     Physical Exam  Vitals and nursing note reviewed. Exam conducted with a chaperone present (Mother).   Constitutional:       General: She is not in acute distress.     Appearance: She is not ill-appearing, toxic-appearing or diaphoretic.      Comments: Patient pleasant and cooperative.  Very attentive to her mom in the wheelchair.  She herself appears a bit disheveled.   HENT:      Head: Normocephalic and atraumatic.   Eyes:      Pupils: Pupils are equal, round, and reactive to light.   Pulmonary:      Effort: Pulmonary effort is normal. No respiratory distress.   Chest:      Comments: Profuse fungal infection under both breasts.  Pendulous breasts.  Patient notes that she often sweats.  Will try an antifungal powder rather than cream.  Abdominal:      General: There is no distension.      Palpations: Abdomen is soft.   Musculoskeletal:         General: Normal range of motion.      Left shoulder: Tenderness (Generalized throughout the shoulder although worse over the posterior  aspect) present. No bony tenderness. Normal range of motion (Able to perform although this increases pain).      Cervical back: Normal range of motion and neck supple.      Lumbar back: Tenderness present. No signs of trauma, spasms or bony tenderness. Normal range of motion.      Right hip: Bony tenderness (Generalized over entire hip joint) present. Normal range of motion.      Left hip: Bony tenderness (Generalized over entire hip joint) present. Normal range of motion.   Skin:     General: Skin is warm and dry.      Capillary Refill: Capillary refill takes less than 2 seconds.      Findings: Rash (Profuse fungal rash under bilateral breasts and on upper abdomen) present.   Neurological:      General: No focal deficit present.      Mental Status: She is alert and oriented to person, place, and time.   Psychiatric:         Mood and Affect: Mood and affect normal.         Speech: Speech normal.         Behavior: Behavior normal. Behavior is cooperative.

## 2024-11-26 NOTE — PATIENT INSTRUCTIONS
The rash under your breasts is a fungal infection.  Use the antifungal powder or cream 2-3x/day until clear for 2-3 days.  Use fresh towels and clothes every day.    For the joint pain, you may use the voltaren/diclofenac gel up to 4x/day.  This is a topical anti-inflammatory and is the safest option.  Also try the stretches at home as tolerated.    Oral anti-inflammatories combined with alcohol increase risk of stomach bleeding.  Tylenol can irritate your liver.

## 2024-12-05 ENCOUNTER — NURSE TRIAGE (OUTPATIENT)
Age: 49
End: 2024-12-05

## 2024-12-05 ENCOUNTER — OFFICE VISIT (OUTPATIENT)
Dept: URGENT CARE | Facility: CLINIC | Age: 49
End: 2024-12-05
Payer: COMMERCIAL

## 2024-12-05 VITALS
HEIGHT: 69 IN | BODY MASS INDEX: 36.58 KG/M2 | HEART RATE: 96 BPM | TEMPERATURE: 98.4 F | WEIGHT: 247 LBS | DIASTOLIC BLOOD PRESSURE: 94 MMHG | OXYGEN SATURATION: 97 % | SYSTOLIC BLOOD PRESSURE: 151 MMHG | RESPIRATION RATE: 20 BRPM

## 2024-12-05 DIAGNOSIS — I10 ESSENTIAL HYPERTENSION: ICD-10-CM

## 2024-12-05 DIAGNOSIS — N30.01 ACUTE CYSTITIS WITH HEMATURIA: Primary | ICD-10-CM

## 2024-12-05 DIAGNOSIS — F10.10 ALCOHOL ABUSE: ICD-10-CM

## 2024-12-05 DIAGNOSIS — F32.A DEPRESSION, UNSPECIFIED DEPRESSION TYPE: ICD-10-CM

## 2024-12-05 DIAGNOSIS — R30.0 DYSURIA: ICD-10-CM

## 2024-12-05 LAB
SL AMB  POCT GLUCOSE, UA: NEGATIVE
SL AMB LEUKOCYTE ESTERASE,UA: ABNORMAL
SL AMB POCT BILIRUBIN,UA: NEGATIVE
SL AMB POCT BLOOD,UA: ABNORMAL
SL AMB POCT CLARITY,UA: CLEAR
SL AMB POCT COLOR,UA: YELLOW
SL AMB POCT KETONES,UA: NEGATIVE
SL AMB POCT NITRITE,UA: NEGATIVE
SL AMB POCT PH,UA: 6
SL AMB POCT SPECIFIC GRAVITY,UA: 1
SL AMB POCT URINE PROTEIN: NEGATIVE
SL AMB POCT UROBILINOGEN: 0.2

## 2024-12-05 PROCEDURE — 87186 SC STD MICRODIL/AGAR DIL: CPT | Performed by: ORTHOPAEDIC SURGERY

## 2024-12-05 PROCEDURE — 87077 CULTURE AEROBIC IDENTIFY: CPT | Performed by: ORTHOPAEDIC SURGERY

## 2024-12-05 PROCEDURE — S9088 SERVICES PROVIDED IN URGENT: HCPCS | Performed by: ORTHOPAEDIC SURGERY

## 2024-12-05 PROCEDURE — 99214 OFFICE O/P EST MOD 30 MIN: CPT | Performed by: ORTHOPAEDIC SURGERY

## 2024-12-05 PROCEDURE — 87086 URINE CULTURE/COLONY COUNT: CPT | Performed by: ORTHOPAEDIC SURGERY

## 2024-12-05 PROCEDURE — 81002 URINALYSIS NONAUTO W/O SCOPE: CPT | Performed by: ORTHOPAEDIC SURGERY

## 2024-12-05 RX ORDER — CEPHALEXIN 500 MG/1
500 CAPSULE ORAL EVERY 12 HOURS SCHEDULED
Qty: 14 CAPSULE | Refills: 0 | Status: SHIPPED | OUTPATIENT
Start: 2024-12-05 | End: 2024-12-12

## 2024-12-05 NOTE — PROGRESS NOTES
"Patient reports she has not taken prescribed medication for over a month.  Patient states she does not like taking medicine.   Patient c/o pain with urination and pain with moving bowels.  Patient states \"this is probably because of all the acid I drink, I drink at least a case of beer a day.  I live with my mother and she does not mind my drinking, she knows I drink because I am depressed.\"  When discussing alcohol dependence and depression with patient, she denies wanting help or information regarding rehabilitation and/or psychiatric help.  Patient reports she tried this all in the past and does not have a ride to any of these places.  Patient reports PCP did try to get her help in the past, but states it does not help.  Alcohol and abuse screening completed.    "

## 2024-12-05 NOTE — TELEPHONE ENCOUNTER
Regarding: Burning in gluteus area  ----- Message from Carlton KRAFT sent at 12/5/2024  1:57 PM EST -----  Patient states feels like a burning sensation in the crease of her buttocks. Patient states is not not able to sit, pain scale: 10/10. Patient states did take a shower, but still felt burning after getting out. Patient did not notice any blood, states all started today

## 2024-12-05 NOTE — TELEPHONE ENCOUNTER
"Called pt back for further information on the s/s she is experiencing.  Pt states she doesn't have an open sore on the fold of her buttocks.  Pt states the pain she's actually experiencing she feels is from a UTI.   Pt c/o urinary frequency and dysuria.  Advised pt go to  for further evaluation and treatment as there are no further available appts in the office.  Pt is agreeable with plan.  States she will go before UC closes at 8pm.    Reason for Disposition   Urinating more frequently than usual (i.e., frequency) OR new-onset of the feeling of an urgent need to urinate (i.e., urgency)    Answer Assessment - Initial Assessment Questions  1. SYMPTOM: \"What's the main symptom you're concerned about?\" (e.g., frequency, incontinence)      Pt is c/o burning upon urination and frequency.    2. ONSET: \"When did the  symptoms  start?\"      Today    3. PAIN: \"Is there any pain?\" If Yes, ask: \"How bad is it?\" (Scale: 1-10; mild, moderate, severe)      10/10    4. CAUSE: \"What do you think is causing the symptoms?\"      Pt thinks she has a UTI    5. OTHER SYMPTOMS: \"Do you have any other symptoms?\" (e.g., blood in urine, fever, flank pain, pain with urination)      Hurts to sit.    Protocols used: Urinary Symptoms-Adult-[place]    "

## 2024-12-05 NOTE — PROGRESS NOTES
[facility]        NAME: Kathleen Robles is a 49 y.o. female  : 1975    MRN: [id number]  DATE: 2024  TIME: 3:35 PM    Assessment and Plan   Acute cystitis with hematuria [N30.01]  1. Acute cystitis with hematuria  cephalexin (KEFLEX) 500 mg capsule      2. Dysuria  POCT urine dip    Urine culture    Urine culture      3. Alcohol abuse  Ambulatory referral to Psych Services      4. Depression, unspecified depression type  Ambulatory referral to Psych Services      5. Essential hypertension          POCT urine positive for hematuria and leukocytes. No nitrites, bilirubin, ketones. Will begin treatment for UTI. Advised patient we will call if the urine culture results show a need for change in treatment plan.     Patient also reports difficulties with depression. She has a long history of alcohol abuse, and has tried in the past to undergo treatment. She does not currently take any of her prescribed medication. She denies any thoughts of suicide or self-arm. The patient states she feels safe at home. After discussion of available resources, I did provide her with a referral to Psychiatric care as well as information for the [facility].     The patient's blood pressure elevated today, patient does have a known history. She has not been taking any of her prescribed medication. She denies any red flag symptoms. Discussed with the patient the importance of taking her medication as well as following up with her PCP.     Patient Instructions     Take prescription antibiotic as prescribed.   Your urine sample was sent to our labs for culture. The office will contact you following results to either continue your current antibiotics or change to a different antibiotic that will work better  You may have been prescribed phenazopyridine (Pyridium) to take for relief of UTI symptoms such as pain, burning, irritation, and urinary frequency. It should only be taken for the first  48 hours of treatment. This drug causes orange/yellow discoloration of the urine, which is a normal side effect.   You should stay properly hydrated and frequently urinate to help flush out the infection  Warm compresses for abdominal discomfort  Follow up with your PCP in 3-5 days  Proceed to ER if your symptoms worsen  If you experience worsening abdominal/back pain, fever/chills, bloody urination      If tests are performed, our office will contact you with results only if changes need to made to the care plan discussed with you at the visit. [facility].    Chief Complaint     Chief Complaint   Patient presents with    Possible UTI     Patient c/o dysuria and urinary frequency that started this morning.          History of Present Illness       49-year-old female presents to the urgent care for evaluation of urinary burning and frequency.  The patient states symptoms started this morning.  She states that it has been years since her last UTI.  She denies any fevers or chills.  She denies any back or flank pain.  She denies noticing any blood in her urine.  She denies any vaginal discharge.  The patient, who struggles with depression and alcoholism, admits that she drink a few beers today.  She denies any abdominal pain.  She denies any headache, dizziness, chest pain, lightheadedness, heart palpitations.  The patient also has a past medical history of bipolar disorder, schizophrenia, diabetes, asthma, hypertension.  She admits that she is not taking any of her prescribed medication.        Review of Systems   Review of Systems   Constitutional:  Negative for chills and fever.   HENT:  Negative for ear pain and sore throat.    Eyes:  Negative for pain and visual disturbance.   Respiratory:  Negative for cough and shortness of breath.    Cardiovascular:  Negative for chest pain and palpitations.   Gastrointestinal:  Negative for abdominal pain, diarrhea, nausea and  vomiting.   Genitourinary:  Positive for dysuria and frequency. Negative for flank pain, hematuria and urgency.   Musculoskeletal:  Negative for arthralgias and back pain.   Skin:  Negative for color change and rash.   Neurological:  Negative for seizures and syncope.   Psychiatric/Behavioral:  Negative for self-injury and suicidal ideas.    All other systems reviewed and are negative.        Current Medications       Current Outpatient Medications:     albuterol (ProAir HFA) 90 mcg/act inhaler, Inhale 2 puffs every 6 (six) hours as needed for wheezing or shortness of breath, Disp: 18 g, Rfl: 3    cephalexin (KEFLEX) 500 mg capsule, Take 1 capsule (500 mg total) by mouth every 12 (twelve) hours for 7 days, Disp: 14 capsule, Rfl: 0    Diclofenac Sodium (Voltaren) 1 %, Apply 2 g topically 4 (four) times a day (Patient not taking: Reported on 12/5/2024), Disp: 600 g, Rfl: 1    glucose blood (OneTouch Ultra) test strip, Use as instructed (Patient not taking: Reported on 12/5/2024), Disp: 100 each, Rfl: 1    loratadine (CLARITIN) 10 mg tablet, Take 1 tablet (10 mg total) by mouth daily (Patient not taking: Reported on 12/5/2024), Disp: 90 tablet, Rfl: 1    metoprolol tartrate (LOPRESSOR) 50 mg tablet, Take 1 tablet (50 mg total) by mouth every 12 (twelve) hours (Patient not taking: Reported on 12/5/2024), Disp: 180 tablet, Rfl: 1    mometasone-formoterol (Dulera) 100-5 MCG/ACT inhaler, Inhale 2 puffs 2 (two) times a day Rinse mouth after use. (Patient not taking: Reported on 12/5/2024), Disp: 13 g, Rfl: 3    montelukast (SINGULAIR) 10 mg tablet, Take 1 tablet (10 mg total) by mouth daily at bedtime (Patient not taking: Reported on 12/5/2024), Disp: 90 tablet, Rfl: 1    naltrexone (REVIA) 50 mg tablet, Take 1 tablet (50 mg total) by mouth daily (Patient not taking: Reported on 12/5/2024), Disp: 90 tablet, Rfl: 0    nystatin (MYCOSTATIN) cream, Apply topically 2 (two) times a day (Patient not taking: Reported on  "12/5/2024), Disp: 30 g, Rfl: 1    nystatin (MYCOSTATIN) powder, Apply topically 3 (three) times a day Until clear for 2-3 days (Patient not taking: Reported on 12/5/2024), Disp: 180 g, Rfl: 1    omeprazole (PriLOSEC) 40 MG capsule, Take 1 capsule (40 mg total) by mouth daily (Patient not taking: Reported on 12/5/2024), Disp: 90 capsule, Rfl: 1    polyethylene glycol (GOLYTELY) 4000 mL solution, Take 4,000 mL by mouth once for 1 dose (Patient not taking: Reported on 12/5/2024), Disp: 4000 mL, Rfl: 0    Semaglutide-Weight Management (WEGOVY) 0.25 MG/0.5ML, Inject 0.5 mL (0.25 mg total) under the skin once a week for 28 days (Patient not taking: Reported on 12/5/2024), Disp: 2 mL, Rfl: 0    sucralfate (CARAFATE) 1 g tablet, Take 1 g by mouth 4 (four) times a day as needed (Patient not taking: Reported on 12/5/2024), Disp: , Rfl:     Current Allergies     Allergies as of 12/05/2024 - Reviewed 12/05/2024   Allergen Reaction Noted    Pollen extract Eye Swelling 01/07/2023            The following portions of the patient's history were reviewed and updated as appropriate: allergies, current medications, past family history, past medical history, past social history, past surgical history and problem list.     Past Medical History:   Diagnosis Date    Alcohol abuse 1/4/2024    Bipolar disorder (HCC)     Diabetes mellitus (HCC)     Hypertension     Psychiatric disorder        No past surgical history on file.    Family History   Problem Relation Age of Onset    Diabetes Mother     Diabetes Brother          Medications have been verified.        Objective   /94   Pulse 96   Temp 98.4 °F (36.9 °C) (Temporal)   Resp 20   Ht 5' 9\" (1.753 m)   Wt 112 kg (247 lb)   SpO2 97%   BMI 36.48 kg/m²        Physical Exam     Physical Exam  Vitals and nursing note reviewed.   Constitutional:       General: She is not in acute distress.     Appearance: Normal appearance. She is not ill-appearing.      Comments: The patient is " alert and oriented, in no acute distress   HENT:      Head: Normocephalic and atraumatic.      Nose: Nose normal.      Mouth/Throat:      Mouth: Mucous membranes are moist.      Pharynx: Oropharynx is clear.   Eyes:      Extraocular Movements: Extraocular movements intact.      Pupils: Pupils are equal, round, and reactive to light.   Cardiovascular:      Rate and Rhythm: Normal rate and regular rhythm.      Pulses: Normal pulses.      Heart sounds: Normal heart sounds.   Pulmonary:      Effort: Pulmonary effort is normal. No respiratory distress.      Breath sounds: Normal breath sounds.   Abdominal:      Tenderness: There is no right CVA tenderness or left CVA tenderness.   Musculoskeletal:         General: Normal range of motion.      Cervical back: Normal range of motion.   Skin:     General: Skin is warm and dry.      Capillary Refill: Capillary refill takes less than 2 seconds.   Neurological:      General: No focal deficit present.      Mental Status: She is alert and oriented to person, place, and time.   Psychiatric:         Mood and Affect: Mood normal.         Behavior: Behavior normal.         Thought Content: Thought content normal.         Judgment: Judgment normal.

## 2024-12-06 ENCOUNTER — TELEPHONE (OUTPATIENT)
Age: 49
End: 2024-12-06

## 2024-12-06 ENCOUNTER — DOCUMENTATION (OUTPATIENT)
Dept: ADMINISTRATIVE | Facility: OTHER | Age: 49
End: 2024-12-06

## 2024-12-06 ENCOUNTER — TELEPHONE (OUTPATIENT)
Dept: PSYCHIATRY | Facility: CLINIC | Age: 49
End: 2024-12-06

## 2024-12-06 VITALS — DIASTOLIC BLOOD PRESSURE: 91 MMHG | SYSTOLIC BLOOD PRESSURE: 128 MMHG

## 2024-12-06 NOTE — TELEPHONE ENCOUNTER
Message sent to [facility] intake regarding referral for client for therapy for Alcohol issues.

## 2024-12-06 NOTE — TELEPHONE ENCOUNTER
Patient has routine referral on file for TT services due to patient's primary diagnosis writer forwarded referral to [facility] program for further review.

## 2024-12-06 NOTE — PROGRESS NOTES
Eva Abdul, RN  P Patient Reported Team         Blood pressure elevated  Appointment department: [facility]  Appointment provider: Lovely Ureña PA-C  Blood pressure  12/05/24 1502 163/96  12/05/24 1459 (!) 144/106  12/06/24 1:30 PM    Patient was called after the Urgent Care visit . Home BP reading(s) was/were 151/98 at 1:36 and 128/91 at 1:38. Patient has no symptoms., Patient agreed to schedule appointment.  Patient did not take BP medications today.  Message to PCP no appointments for this QA to give patient.     Thank you.  Javed Matute MA  [facility]

## 2024-12-07 LAB
BACTERIA UR CULT: ABNORMAL
BACTERIA UR CULT: ABNORMAL

## 2024-12-09 NOTE — TELEPHONE ENCOUNTER
Received [employer], informing a vm was left  with information regarding this referral and provided share office phone number     Writer closed referral

## 2024-12-09 NOTE — TELEPHONE ENCOUNTER
Pt called back regarding referral. Pt is ineligible due to county of residence and insurance. Suggest you refer pt to [facility].    For your review.

## 2024-12-10 ENCOUNTER — OFFICE VISIT (OUTPATIENT)
Dept: BEHAVIORAL/MENTAL HEALTH CLINIC | Facility: CLINIC | Age: 49
End: 2024-12-10
Payer: COMMERCIAL

## 2024-12-10 DIAGNOSIS — F31.32 BIPOLAR 1 DISORDER, DEPRESSED, MODERATE (HCC): Primary | ICD-10-CM

## 2024-12-10 PROCEDURE — H2011 CRISIS INTERVEN SVC, 15 MIN: HCPCS

## 2024-12-10 NOTE — PROGRESS NOTES
Called patient to follow up on this, she reports that her BP has continued to be high, and that last time she took it was a couple of days ago and it was around 153/113. Patient said she does not take her BP medications because she does not like to take pills. Please advise.   Received message from [facility] infusion center that the patient is in need of Prolia orders. All orders pended, will notify infusion center when scanned into chart.
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
## 2024-12-11 ENCOUNTER — TELEPHONE (OUTPATIENT)
Dept: BEHAVIORAL/MENTAL HEALTH CLINIC | Facility: CLINIC | Age: 49
End: 2024-12-11

## 2024-12-11 ENCOUNTER — TELEPHONE (OUTPATIENT)
Age: 49
End: 2024-12-11

## 2024-12-11 NOTE — PROGRESS NOTES
Spoke with pt. States she is not having withdrawal. Admits to have been drinking today, and is nauseated. Reports feeling fine otherwise.

## 2024-12-11 NOTE — TELEPHONE ENCOUNTER
Patient called, she had a question about transportation. She doesn't drive and has a hard time getting rides. She is asking if there is a way she could be set up for transportation to be able to get to her appts?     Patient would like a call back, please advise.    Thank you.

## 2024-12-12 ENCOUNTER — TELEPHONE (OUTPATIENT)
Dept: BEHAVIORAL/MENTAL HEALTH CLINIC | Facility: CLINIC | Age: 49
End: 2024-12-12

## 2024-12-13 NOTE — TELEPHONE ENCOUNTER
Telephone call to patient on home phone ([phone]). Patient answered and states that she is doing okay.    Patient has not called [facility] yet because she states she needs to speak with her cousin about transportation. [facility] SW provided number for Medical Assistance Transportation to patient for [facility] ([phone]).     Discussion held with patient to call [facility], return to walk-in center, call 911 or go to the nearest emergency room immediately if their situation changes/worsens.

## 2024-12-18 ENCOUNTER — OFFICE VISIT (OUTPATIENT)
Dept: URGENT CARE | Facility: CLINIC | Age: 49
End: 2024-12-18
Payer: COMMERCIAL

## 2024-12-18 VITALS
HEIGHT: 69 IN | TEMPERATURE: 98.7 F | WEIGHT: 247 LBS | DIASTOLIC BLOOD PRESSURE: 76 MMHG | OXYGEN SATURATION: 97 % | BODY MASS INDEX: 36.58 KG/M2 | RESPIRATION RATE: 16 BRPM | SYSTOLIC BLOOD PRESSURE: 128 MMHG | HEART RATE: 112 BPM

## 2024-12-18 DIAGNOSIS — J06.9 VIRAL UPPER RESPIRATORY TRACT INFECTION: Primary | ICD-10-CM

## 2024-12-18 PROCEDURE — S9088 SERVICES PROVIDED IN URGENT: HCPCS | Performed by: NURSE PRACTITIONER

## 2024-12-18 PROCEDURE — 99213 OFFICE O/P EST LOW 20 MIN: CPT | Performed by: NURSE PRACTITIONER

## 2024-12-18 RX ORDER — GUAIFENESIN/DEXTROMETHORPHAN 100-10MG/5
10 SYRUP ORAL 4 TIMES DAILY PRN
Qty: 236 ML | Refills: 1 | Status: SHIPPED | OUTPATIENT
Start: 2024-12-18

## 2024-12-18 NOTE — PROGRESS NOTES
[facility]        NAME: Kathleen Robles is a 49 y.o. female  : 1975    MRN: [id number]  DATE: 2024  TIME: 4:09 PM      Assessment and Plan     Viral upper respiratory tract infection [J06.9]  1. Viral upper respiratory tract infection  dextromethorphan-guaiFENesin (ROBITUSSIN DM)  mg/5 mL syrup            Patient Instructions     Patient Instructions   Viruses are worst for the first 3-6 days, mostly better by days 7-10 and all the way better by days 7-14.  If you are not feeling improvement by the 7-10 day viktor, or if your symptoms significantly change, you should be seen again.    Follow up with PCP in 3-5 days.  Proceed to  ER if symptoms worsen.    Chief Complaint     Chief Complaint   Patient presents with    Cough     Patient c/o cough that started three days ago.         History of Present Illness     Patient presents accompanied by significant other.  She reports mild congestion and cough over the last 3 days.  She stays with her mom, and states that her mom had a few doses of cold medication that she took.  This did not seem to help very much.  She does have a history of asthma and has an available as needed albuterol inhaler but has not needed it.  She is a prior smoker who quit around 4 months ago.  She states that the podiatrist told her she cannot have her bunion surgery unless she was able to quit smoking.  She is scheduled to see the podiatrist tomorrow but is uncertain if she will have transportation, stating that the cousin who is supposed to drive her has not been calling her back today.  She also expresses concern over whether or not she should attend since she is ill.  She denies any fever.  Discussed that her symptoms are very mild and while she should definitely make the office aware, they will likely still see her.        Review of Systems     Review of Systems   HENT:  Positive for congestion.    Respiratory:  Positive for cough.    All other systems  reviewed and are negative.        Current Medications       Current Outpatient Medications:     albuterol (ProAir HFA) 90 mcg/act inhaler, Inhale 2 puffs every 6 (six) hours as needed for wheezing or shortness of breath, Disp: 18 g, Rfl: 3    dextromethorphan-guaiFENesin (ROBITUSSIN DM)  mg/5 mL syrup, Take 10 mL by mouth 4 (four) times a day as needed for cough or congestion, Disp: 236 mL, Rfl: 1    montelukast (SINGULAIR) 10 mg tablet, Take 1 tablet (10 mg total) by mouth daily at bedtime, Disp: 90 tablet, Rfl: 1    Diclofenac Sodium (Voltaren) 1 %, Apply 2 g topically 4 (four) times a day (Patient not taking: Reported on 12/18/2024), Disp: 600 g, Rfl: 1    glucose blood (OneTouch Ultra) test strip, Use as instructed (Patient not taking: Reported on 12/18/2024), Disp: 100 each, Rfl: 1    loratadine (CLARITIN) 10 mg tablet, Take 1 tablet (10 mg total) by mouth daily (Patient not taking: Reported on 12/18/2024), Disp: 90 tablet, Rfl: 1    metoprolol tartrate (LOPRESSOR) 50 mg tablet, Take 1 tablet (50 mg total) by mouth every 12 (twelve) hours (Patient not taking: Reported on 12/18/2024), Disp: 180 tablet, Rfl: 1    mometasone-formoterol (Dulera) 100-5 MCG/ACT inhaler, Inhale 2 puffs 2 (two) times a day Rinse mouth after use. (Patient not taking: Reported on 12/18/2024), Disp: 13 g, Rfl: 3    naltrexone (REVIA) 50 mg tablet, Take 1 tablet (50 mg total) by mouth daily (Patient not taking: Reported on 12/18/2024), Disp: 90 tablet, Rfl: 0    nystatin (MYCOSTATIN) cream, Apply topically 2 (two) times a day (Patient not taking: Reported on 12/18/2024), Disp: 30 g, Rfl: 1    nystatin (MYCOSTATIN) powder, Apply topically 3 (three) times a day Until clear for 2-3 days (Patient not taking: Reported on 12/18/2024), Disp: 180 g, Rfl: 1    omeprazole (PriLOSEC) 40 MG capsule, Take 1 capsule (40 mg total) by mouth daily (Patient not taking: Reported on 12/18/2024), Disp: 90 capsule, Rfl: 1    polyethylene glycol (GOLYTELY)  "4000 mL solution, Take 4,000 mL by mouth once for 1 dose (Patient not taking: Reported on 12/5/2024), Disp: 4000 mL, Rfl: 0    sucralfate (CARAFATE) 1 g tablet, Take 1 g by mouth 4 (four) times a day as needed (Patient not taking: Reported on 12/18/2024), Disp: , Rfl:     Current Allergies     Allergies as of 12/18/2024 - Reviewed 12/18/2024   Allergen Reaction Noted    Pollen extract Eye Swelling 01/07/2023              The following portions of the patient's history were reviewed and updated as appropriate: allergies, current medications, past family history, past medical history, past social history, past surgical history and problem list.     Past Medical History:   Diagnosis Date    Alcohol abuse 1/4/2024    Bipolar disorder (HCC)     Diabetes mellitus (HCC)     Hypertension     Psychiatric disorder        History reviewed. No pertinent surgical history.    Family History   Problem Relation Age of Onset    Diabetes Mother     Diabetes Brother          Medications have been verified.        Objective     /76 (Patient Position: Sitting, Cuff Size: Large)   Pulse (!) 112   Temp 98.7 °F (37.1 °C) (Temporal)   Resp 16   Ht 5' 9\" (1.753 m)   Wt 112 kg (247 lb)   SpO2 97%   BMI 36.48 kg/m²   No LMP recorded. Patient is postmenopausal.         Physical Exam     Physical Exam  Vitals and nursing note reviewed.   Constitutional:       General: She is not in acute distress.     Appearance: She is well-developed. She is not ill-appearing, toxic-appearing or diaphoretic.   HENT:      Head: Normocephalic and atraumatic.      Right Ear: Tympanic membrane, ear canal and external ear normal.      Left Ear: Tympanic membrane, ear canal and external ear normal.      Nose: Mucosal edema and congestion present.      Mouth/Throat:      Mouth: Mucous membranes are moist.      Pharynx: Oropharynx is clear. Uvula midline. No oropharyngeal exudate or posterior oropharyngeal erythema.   Eyes:      Conjunctiva/sclera: " Conjunctivae normal.      Pupils: Pupils are equal, round, and reactive to light.   Cardiovascular:      Rate and Rhythm: Normal rate and regular rhythm. No extrasystoles are present.     Heart sounds: Normal heart sounds, S1 normal and S2 normal. No murmur heard.     No friction rub. No gallop.   Pulmonary:      Effort: Pulmonary effort is normal. No tachypnea, bradypnea, accessory muscle usage, prolonged expiration, respiratory distress or retractions.      Breath sounds: Normal breath sounds and air entry. No stridor or decreased air movement. No decreased breath sounds, wheezing, rhonchi or rales.   Chest:      Chest wall: No tenderness.   Abdominal:      General: There is no distension.      Palpations: Abdomen is soft.   Musculoskeletal:         General: Normal range of motion.      Cervical back: Normal range of motion and neck supple.   Skin:     General: Skin is warm and dry.      Capillary Refill: Capillary refill takes less than 2 seconds.   Neurological:      General: No focal deficit present.      Mental Status: She is alert and oriented to person, place, and time.   Psychiatric:         Mood and Affect: Mood and affect normal.         Behavior: Behavior normal. Behavior is cooperative.         Thought Content: Thought content normal.         Judgment: Judgment normal.

## 2024-12-18 NOTE — PATIENT INSTRUCTIONS
Viruses are worst for the first 3-6 days, mostly better by days 7-10 and all the way better by days 7-14.  If you are not feeling improvement by the 7-10 day viktor, or if your symptoms significantly change, you should be seen again.

## 2024-12-23 ENCOUNTER — APPOINTMENT (OUTPATIENT)
Dept: LAB | Facility: CLINIC | Age: 49
End: 2024-12-23
Payer: COMMERCIAL

## 2024-12-23 DIAGNOSIS — Z01.818 OTHER SPECIFIED PRE-OPERATIVE EXAMINATION: ICD-10-CM

## 2024-12-23 PROCEDURE — 80307 DRUG TEST PRSMV CHEM ANLYZR: CPT

## 2024-12-26 ENCOUNTER — OFFICE VISIT (OUTPATIENT)
Dept: URGENT CARE | Facility: CLINIC | Age: 49
End: 2024-12-26
Payer: COMMERCIAL

## 2024-12-26 VITALS
RESPIRATION RATE: 18 BRPM | OXYGEN SATURATION: 100 % | HEART RATE: 92 BPM | DIASTOLIC BLOOD PRESSURE: 90 MMHG | WEIGHT: 241 LBS | SYSTOLIC BLOOD PRESSURE: 138 MMHG | BODY MASS INDEX: 35.7 KG/M2 | TEMPERATURE: 98.4 F | HEIGHT: 69 IN

## 2024-12-26 DIAGNOSIS — R21 RASH: Primary | ICD-10-CM

## 2024-12-26 LAB
COTININE, URINE: NEGATIVE NG/ML
Lab: NORMAL

## 2024-12-26 PROCEDURE — 99213 OFFICE O/P EST LOW 20 MIN: CPT | Performed by: ORTHOPAEDIC SURGERY

## 2024-12-26 PROCEDURE — S9088 SERVICES PROVIDED IN URGENT: HCPCS | Performed by: ORTHOPAEDIC SURGERY

## 2024-12-26 RX ORDER — PREDNISONE 10 MG/1
TABLET ORAL
Qty: 18 TABLET | Refills: 0 | Status: SHIPPED | OUTPATIENT
Start: 2024-12-26

## 2024-12-26 RX ORDER — TRIAMCINOLONE ACETONIDE 1 MG/G
CREAM TOPICAL 2 TIMES DAILY
Qty: 80 G | Refills: 0 | Status: SHIPPED | OUTPATIENT
Start: 2024-12-26

## 2024-12-27 NOTE — PROGRESS NOTES
[facility]        NAME: Kathleen Robles is a 49 y.o. female  : 1975    MRN: [id number]  DATE: 2024  TIME: 7:00 PM    Assessment and Plan   Rash [R21]  1. Rash  predniSONE 10 mg tablet    triamcinolone (KENALOG) 0.1 % cream        History and exam concerning for eczema versus allergic dermatitis versus bedbug bites.  Patient does admit that she believes that bedbugs in the home and is trying to find an .,  However, that no one else in the home seem to have any similar rash.  Advised patient to proper hygiene, that she is improving.  Will trial a course of steroids, the patient will follow-up with her PCP if her symptoms fail to improve.    Patient Instructions       Follow up with PCP in 3-5 days.  Proceed to  ER if symptoms worsen.    If tests are performed, our office will contact you with results only if changes need to made to the care plan discussed with you at the visit. [facility]    Chief Complaint     Chief Complaint   Patient presents with    Rash     Started a month ago. Rash is on her chest, back and arms.          History of Present Illness       49-year-old female presents to the urgent care for evaluation of an itchy rash on her chest, arms, legs, back and abdomen the patient states that she has had this rash for a month.  She did discuss her rash with her PCP on 2024 and was prescribed Claritin for an allergic reaction.  The patient admits that at night her rash is worse.  She notes ongoing healing and development of new lesions, which do scab.  Patient states that no one else in the home seems to have any similar rash, though upon further questioning she does admit that she thinks there may be bedbugs in the home.  The patient reports she is trying to get an .  The patient denies any new medication.  She notes that she does not wash her clothing and bed sheets frequently.  She denies any fevers,  chills.        Review of Systems   Review of Systems   Constitutional:  Negative for chills and fever.   HENT:  Negative for ear pain and sore throat.    Eyes:  Negative for pain and visual disturbance.   Respiratory:  Negative for cough and shortness of breath.    Cardiovascular:  Negative for chest pain and palpitations.   Gastrointestinal:  Negative for abdominal pain, diarrhea, nausea and vomiting.   Genitourinary:  Negative for dysuria and hematuria.   Musculoskeletal:  Negative for arthralgias and back pain.   Skin:  Positive for rash. Negative for color change.   Neurological:  Negative for seizures and syncope.   All other systems reviewed and are negative.        Current Medications       Current Outpatient Medications:     albuterol (ProAir HFA) 90 mcg/act inhaler, Inhale 2 puffs every 6 (six) hours as needed for wheezing or shortness of breath, Disp: 18 g, Rfl: 3    dextromethorphan-guaiFENesin (ROBITUSSIN DM)  mg/5 mL syrup, Take 10 mL by mouth 4 (four) times a day as needed for cough or congestion, Disp: 236 mL, Rfl: 1    Diclofenac Sodium (Voltaren) 1 %, Apply 2 g topically 4 (four) times a day, Disp: 600 g, Rfl: 1    loratadine (CLARITIN) 10 mg tablet, Take 1 tablet (10 mg total) by mouth daily, Disp: 90 tablet, Rfl: 1    montelukast (SINGULAIR) 10 mg tablet, Take 1 tablet (10 mg total) by mouth daily at bedtime, Disp: 90 tablet, Rfl: 1    nystatin (MYCOSTATIN) cream, Apply topically 2 (two) times a day, Disp: 30 g, Rfl: 1    omeprazole (PriLOSEC) 40 MG capsule, Take 1 capsule (40 mg total) by mouth daily, Disp: 90 capsule, Rfl: 1    predniSONE 10 mg tablet, 4 x 3 days, 3 x 1, 2 x 1, 1 x 1, Disp: 18 tablet, Rfl: 0    triamcinolone (KENALOG) 0.1 % cream, Apply topically 2 (two) times a day, Disp: 80 g, Rfl: 0    glucose blood (OneTouch Ultra) test strip, Use as instructed (Patient not taking: Reported on 12/5/2024), Disp: 100 each, Rfl: 1    metoprolol tartrate (LOPRESSOR) 50 mg tablet, Take 1  "tablet (50 mg total) by mouth every 12 (twelve) hours (Patient not taking: Reported on 12/5/2024), Disp: 180 tablet, Rfl: 1    mometasone-formoterol (Dulera) 100-5 MCG/ACT inhaler, Inhale 2 puffs 2 (two) times a day Rinse mouth after use. (Patient not taking: Reported on 12/5/2024), Disp: 13 g, Rfl: 3    naltrexone (REVIA) 50 mg tablet, Take 1 tablet (50 mg total) by mouth daily (Patient not taking: Reported on 12/5/2024), Disp: 90 tablet, Rfl: 0    nystatin (MYCOSTATIN) powder, Apply topically 3 (three) times a day Until clear for 2-3 days (Patient not taking: Reported on 12/5/2024), Disp: 180 g, Rfl: 1    polyethylene glycol (GOLYTELY) 4000 mL solution, Take 4,000 mL by mouth once for 1 dose (Patient not taking: Reported on 12/5/2024), Disp: 4000 mL, Rfl: 0    sucralfate (CARAFATE) 1 g tablet, Take 1 g by mouth 4 (four) times a day as needed (Patient not taking: Reported on 12/5/2024), Disp: , Rfl:     Current Allergies     Allergies as of 12/26/2024 - Reviewed 12/26/2024   Allergen Reaction Noted    Pollen extract Eye Swelling 01/07/2023            The following portions of the patient's history were reviewed and updated as appropriate: allergies, current medications, past family history, past medical history, past social history, past surgical history and problem list.     Past Medical History:   Diagnosis Date    Alcohol abuse 1/4/2024    Bipolar disorder (HCC)     Diabetes mellitus (HCC)     Hypertension     Psychiatric disorder        History reviewed. No pertinent surgical history.    Family History   Problem Relation Age of Onset    Diabetes Mother     Diabetes Brother          Medications have been verified.        Objective   /90   Pulse 92   Temp 98.4 °F (36.9 °C)   Resp 18   Ht 5' 9\" (1.753 m)   Wt 109 kg (241 lb)   SpO2 100%   BMI 35.59 kg/m²        Physical Exam     Physical Exam  Vitals and nursing note reviewed.   Constitutional:       General: She is not in acute distress.     " Appearance: Normal appearance. She is not ill-appearing.   HENT:      Head: Normocephalic and atraumatic.      Nose: Nose normal.      Mouth/Throat:      Mouth: Mucous membranes are moist.      Pharynx: Oropharynx is clear.   Eyes:      Extraocular Movements: Extraocular movements intact.      Pupils: Pupils are equal, round, and reactive to light.   Cardiovascular:      Rate and Rhythm: Normal rate and regular rhythm.      Pulses: Normal pulses.   Pulmonary:      Effort: Pulmonary effort is normal. No respiratory distress.   Musculoskeletal:         General: Normal range of motion.      Cervical back: Normal range of motion.   Skin:     General: Skin is warm and dry.      Capillary Refill: Capillary refill takes less than 2 seconds.      Comments: Throughout the bilateral upper and lower extremities there are diffuse, small, scabbed papular lesions similar lesions appear on her lower back and abdomen.  No rash between the webspaces of the fingers or on the palms of the hands.   Neurological:      General: No focal deficit present.      Mental Status: She is alert and oriented to person, place, and time.   Psychiatric:         Mood and Affect: Mood normal.         Behavior: Behavior normal.

## 2025-01-04 ENCOUNTER — OFFICE VISIT (OUTPATIENT)
Dept: URGENT CARE | Facility: CLINIC | Age: 50
End: 2025-01-04
Payer: COMMERCIAL

## 2025-01-04 VITALS
WEIGHT: 243.6 LBS | DIASTOLIC BLOOD PRESSURE: 88 MMHG | RESPIRATION RATE: 16 BRPM | TEMPERATURE: 98.2 F | HEART RATE: 95 BPM | BODY MASS INDEX: 35.97 KG/M2 | OXYGEN SATURATION: 99 % | SYSTOLIC BLOOD PRESSURE: 138 MMHG

## 2025-01-04 DIAGNOSIS — L03.116 CELLULITIS OF LEFT LOWER EXTREMITY: Primary | ICD-10-CM

## 2025-01-04 PROCEDURE — S9088 SERVICES PROVIDED IN URGENT: HCPCS | Performed by: NURSE PRACTITIONER

## 2025-01-04 PROCEDURE — 99214 OFFICE O/P EST MOD 30 MIN: CPT | Performed by: NURSE PRACTITIONER

## 2025-01-04 RX ORDER — CEPHALEXIN 500 MG/1
500 CAPSULE ORAL 4 TIMES DAILY
Qty: 40 CAPSULE | Refills: 0 | Status: SHIPPED | OUTPATIENT
Start: 2025-01-04 | End: 2025-01-14

## 2025-01-04 NOTE — PATIENT INSTRUCTIONS
This is most likely a cellulitis/skin infection although I cannot completely rule out a blood clot.  The redness is near the skin excoriation.    After 24 hours, symptoms should not get worse.  It may take a few days to start to see improvement.  If symptoms are worsening after 24 hours or if you are not seeing improvement, you should be seen again.    If your symptoms do not improve with the antibiotic as expected or if they get worse, you should be seen in the ER.

## 2025-01-04 NOTE — PROGRESS NOTES
[facility]        NAME: Kathleen Robles is a 49 y.o. female  : 1975    MRN: [id number]  DATE: 2025  TIME: 4:39 PM      Assessment and Plan     Cellulitis of left lower extremity [L03.116]  1. Cellulitis of left lower extremity  cephalexin (KEFLEX) 500 mg capsule            Patient Instructions     Patient Instructions   This is most likely a cellulitis/skin infection although I cannot completely rule out a blood clot.  The redness is near the skin excoriation.    After 24 hours, symptoms should not get worse.  It may take a few days to start to see improvement.  If symptoms are worsening after 24 hours or if you are not seeing improvement, you should be seen again.    If your symptoms do not improve with the antibiotic as expected or if they get worse, you should be seen in the ER.    Follow up with PCP in 3-5 days.  Proceed to  ER if symptoms worsen.    Chief Complaint     Chief Complaint   Patient presents with    Foot Swelling     Swollen feet B/L started 2 days ago          History of Present Illness     Patient presents accompanied by significant other concerned about swollen feet.  Left leg is swollen; right leg is not.  In the past she has had times that both are swollen and she was given water pills.  This time it is only the left which is also a little bit sore.  No overt injury.  She does have some superficial scratches were rash versus bites were scratched open.    She states that the steroid pills ordered by [facility] last time did help her rash.  She is still using the cream on her chest with good improvement, almost resolution.        Review of Systems     Review of Systems   Cardiovascular:  Positive for leg swelling.   All other systems reviewed and are negative.        Current Medications       Current Outpatient Medications:     albuterol (ProAir HFA) 90 mcg/act inhaler, Inhale 2 puffs every 6 (six) hours as needed for wheezing or shortness of breath, Disp: 18 g, Rfl: 3     cephalexin (KEFLEX) 500 mg capsule, Take 1 capsule (500 mg total) by mouth 4 (four) times a day for 10 days, Disp: 40 capsule, Rfl: 0    dextromethorphan-guaiFENesin (ROBITUSSIN DM)  mg/5 mL syrup, Take 10 mL by mouth 4 (four) times a day as needed for cough or congestion, Disp: 236 mL, Rfl: 1    Diclofenac Sodium (Voltaren) 1 %, Apply 2 g topically 4 (four) times a day, Disp: 600 g, Rfl: 1    loratadine (CLARITIN) 10 mg tablet, Take 1 tablet (10 mg total) by mouth daily, Disp: 90 tablet, Rfl: 1    montelukast (SINGULAIR) 10 mg tablet, Take 1 tablet (10 mg total) by mouth daily at bedtime, Disp: 90 tablet, Rfl: 1    nystatin (MYCOSTATIN) cream, Apply topically 2 (two) times a day, Disp: 30 g, Rfl: 1    omeprazole (PriLOSEC) 40 MG capsule, Take 1 capsule (40 mg total) by mouth daily, Disp: 90 capsule, Rfl: 1    glucose blood (OneTouch Ultra) test strip, Use as instructed (Patient not taking: Reported on 1/4/2025), Disp: 100 each, Rfl: 1    metoprolol tartrate (LOPRESSOR) 50 mg tablet, Take 1 tablet (50 mg total) by mouth every 12 (twelve) hours (Patient not taking: Reported on 1/4/2025), Disp: 180 tablet, Rfl: 1    mometasone-formoterol (Dulera) 100-5 MCG/ACT inhaler, Inhale 2 puffs 2 (two) times a day Rinse mouth after use. (Patient not taking: Reported on 1/4/2025), Disp: 13 g, Rfl: 3    naltrexone (REVIA) 50 mg tablet, Take 1 tablet (50 mg total) by mouth daily (Patient not taking: Reported on 1/4/2025), Disp: 90 tablet, Rfl: 0    nystatin (MYCOSTATIN) powder, Apply topically 3 (three) times a day Until clear for 2-3 days (Patient not taking: Reported on 1/4/2025), Disp: 180 g, Rfl: 1    polyethylene glycol (GOLYTELY) 4000 mL solution, Take 4,000 mL by mouth once for 1 dose (Patient not taking: Reported on 12/5/2024), Disp: 4000 mL, Rfl: 0    predniSONE 10 mg tablet, 4 x 3 days, 3 x 1, 2 x 1, 1 x 1 (Patient not taking: Reported on 1/4/2025), Disp: 18 tablet, Rfl: 0    sucralfate (CARAFATE) 1 g tablet, Take 1  g by mouth 4 (four) times a day as needed (Patient not taking: Reported on 2025), Disp: , Rfl:     triamcinolone (KENALOG) 0.1 % cream, Apply topically 2 (two) times a day (Patient not taking: Reported on 2025), Disp: 80 g, Rfl: 0    Current Allergies     Allergies as of 2025 - Reviewed 2025   Allergen Reaction Noted    Pollen extract Eye Swelling 2023              The following portions of the patient's history were reviewed and updated as appropriate: allergies, current medications, past family history, past medical history, past social history, past surgical history and problem list.     Past Medical History:   Diagnosis Date    Alcohol abuse 2024    Bipolar disorder (HCC)     Diabetes mellitus (HCC)     Hypertension     Psychiatric disorder        History reviewed. No pertinent surgical history.    Family History   Problem Relation Age of Onset    Diabetes Mother     Diabetes Brother          Medications have been verified.        Objective     /88   Pulse 95   Temp 98.2 °F (36.8 °C)   Resp 16   Wt 110 kg (243 lb 9.6 oz)   SpO2 99%   BMI 35.97 kg/m²   No LMP recorded. Patient is postmenopausal.         Physical Exam     Physical Exam  Vitals and nursing note reviewed.   Constitutional:       General: She is not in acute distress.     Appearance: She is well-developed. She is not ill-appearing, toxic-appearing or diaphoretic.   HENT:      Head: Normocephalic and atraumatic.   Cardiovascular:      Rate and Rhythm: Normal rate and regular rhythm. No extrasystoles are present.     Heart sounds: Normal heart sounds, S1 normal and S2 normal.   Pulmonary:      Effort: Pulmonary effort is normal. No respiratory distress.   Abdominal:      Palpations: Abdomen is soft.   Musculoskeletal:         General: Normal range of motion.      Right lower leg: No edema.      Left lower le+ Edema present.   Skin:     General: Skin is warm and dry.      Capillary Refill: Capillary refill  takes less than 2 seconds.      Findings: Abrasion (Superficial excoriation left calf appears to be from scratching) and erythema present.             Comments: Left leg is noticeably more swollen than right although it appears acutely infected.  Negative Homans' sign.  No focal areas of increased pain.    Patient mentions ongoing foot pain and scheduled follow-up with the podiatrist later this month regarding her bunions.  She shows me her feet, but no acute changes noted on assessment.  I suspect the increased discomfort is due to the swelling in her calf.    Discussed that this is most likely an infection but that is very important to present to the emergency room if symptoms get worse or do not improve as expected on the antibiotic over the next few days.  Patient states her understanding.   Neurological:      General: No focal deficit present.      Mental Status: She is alert.   Psychiatric:         Mood and Affect: Mood and affect normal.         Behavior: Behavior normal. Behavior is cooperative.

## 2025-01-13 ENCOUNTER — OFFICE VISIT (OUTPATIENT)
Dept: URGENT CARE | Facility: CLINIC | Age: 50
End: 2025-01-13
Payer: COMMERCIAL

## 2025-01-13 ENCOUNTER — APPOINTMENT (OUTPATIENT)
Dept: URGENT CARE | Facility: CLINIC | Age: 50
End: 2025-01-13
Payer: COMMERCIAL

## 2025-01-13 VITALS
SYSTOLIC BLOOD PRESSURE: 148 MMHG | TEMPERATURE: 98 F | DIASTOLIC BLOOD PRESSURE: 98 MMHG | HEART RATE: 106 BPM | OXYGEN SATURATION: 98 % | RESPIRATION RATE: 18 BRPM

## 2025-01-13 DIAGNOSIS — R42 DIZZINESS: Primary | ICD-10-CM

## 2025-01-13 DIAGNOSIS — Z78.9 ALCOHOL USE: ICD-10-CM

## 2025-01-13 LAB — GLUCOSE SERPL-MCNC: 116 MG/DL (ref 65–140)

## 2025-01-13 PROCEDURE — S9088 SERVICES PROVIDED IN URGENT: HCPCS | Performed by: NURSE PRACTITIONER

## 2025-01-13 PROCEDURE — 82948 REAGENT STRIP/BLOOD GLUCOSE: CPT | Performed by: NURSE PRACTITIONER

## 2025-01-13 PROCEDURE — 99214 OFFICE O/P EST MOD 30 MIN: CPT | Performed by: NURSE PRACTITIONER

## 2025-01-13 PROCEDURE — 93005 ELECTROCARDIOGRAM TRACING: CPT | Performed by: NURSE PRACTITIONER

## 2025-01-13 NOTE — PROGRESS NOTES
[facility]        NAME: Kathleen Robles is a 49 y.o. female  : 1975    MRN: [id number]  DATE: 2025  TIME: 6:35 PM      Assessment and Plan     Dizziness [R42]  1. Dizziness        2. Alcohol use              Patient Instructions     Patient Instructions   If the dizziness would reoccur, you should be assessed at the hospital.  Most likely this dizziness is from the alcohol intoxication.    Follow up with PCP in 3-5 days.  Proceed to  ER if symptoms worsen.    Chief Complaint     Chief Complaint   Patient presents with    Dizziness     Dizziness started today  Was drinking alcohol today had 7 cans         History of Present Illness     Patient is well-known to clinic.  She has a history of active alcohol use disorder.  She presented to the clinic today complaining of some dizziness, visibly inebriated.  Her gait was a bit unsteady.  She was accompanied by her significant other who is also possibly impaired.  They do have housing with her mom, but do not have consistent transportation/do not have their own vehicle.  Confirmed that they had walked here to the clinic.  Patient initially stated that she had had about 7 beers so far today.  Upon further thought, she said it may have been 10.  She affirms that this was not usual for her but that she usually does not experience it was initially feeling.  She also had slight nausea with a little dry heaving during her EKG.    Soon after that, with some rest here (had been placed in room 1 lying down on the stretcher), her symptoms completely resolved.  She states that she was feeling herself, and she longer appeared clinically intoxicated (likely fast alcohol metabolism due to chronic use/abuse).  We discussed that her symptoms were most likely secondary to alcohol intake, but if they would recur she should be seen in the emergency room.  She did tolerate some ice water and donut here.  She had not consumed anything else besides beer today  before this.        Review of Systems     Review of Systems   Gastrointestinal:  Positive for nausea.   Neurological:  Positive for dizziness.   All other systems reviewed and are negative.        Current Medications       Current Outpatient Medications:     albuterol (ProAir HFA) 90 mcg/act inhaler, Inhale 2 puffs every 6 (six) hours as needed for wheezing or shortness of breath, Disp: 18 g, Rfl: 3    dextromethorphan-guaiFENesin (ROBITUSSIN DM)  mg/5 mL syrup, Take 10 mL by mouth 4 (four) times a day as needed for cough or congestion, Disp: 236 mL, Rfl: 1    cephalexin (KEFLEX) 500 mg capsule, Take 1 capsule (500 mg total) by mouth 4 (four) times a day for 10 days (Patient not taking: Reported on 1/13/2025), Disp: 40 capsule, Rfl: 0    Diclofenac Sodium (Voltaren) 1 %, Apply 2 g topically 4 (four) times a day (Patient not taking: Reported on 1/13/2025), Disp: 600 g, Rfl: 1    glucose blood (OneTouch Ultra) test strip, Use as instructed (Patient not taking: Reported on 12/5/2024), Disp: 100 each, Rfl: 1    loratadine (CLARITIN) 10 mg tablet, Take 1 tablet (10 mg total) by mouth daily (Patient not taking: Reported on 1/13/2025), Disp: 90 tablet, Rfl: 1    metoprolol tartrate (LOPRESSOR) 50 mg tablet, Take 1 tablet (50 mg total) by mouth every 12 (twelve) hours (Patient not taking: Reported on 12/5/2024), Disp: 180 tablet, Rfl: 1    mometasone-formoterol (Dulera) 100-5 MCG/ACT inhaler, Inhale 2 puffs 2 (two) times a day Rinse mouth after use. (Patient not taking: Reported on 12/5/2024), Disp: 13 g, Rfl: 3    montelukast (SINGULAIR) 10 mg tablet, Take 1 tablet (10 mg total) by mouth daily at bedtime (Patient not taking: Reported on 1/13/2025), Disp: 90 tablet, Rfl: 1    naltrexone (REVIA) 50 mg tablet, Take 1 tablet (50 mg total) by mouth daily (Patient not taking: Reported on 12/5/2024), Disp: 90 tablet, Rfl: 0    nystatin (MYCOSTATIN) cream, Apply topically 2 (two) times a day (Patient not taking: Reported on  1/13/2025), Disp: 30 g, Rfl: 1    nystatin (MYCOSTATIN) powder, Apply topically 3 (three) times a day Until clear for 2-3 days (Patient not taking: Reported on 12/5/2024), Disp: 180 g, Rfl: 1    omeprazole (PriLOSEC) 40 MG capsule, Take 1 capsule (40 mg total) by mouth daily (Patient not taking: Reported on 1/13/2025), Disp: 90 capsule, Rfl: 1    polyethylene glycol (GOLYTELY) 4000 mL solution, Take 4,000 mL by mouth once for 1 dose (Patient not taking: Reported on 12/5/2024), Disp: 4000 mL, Rfl: 0    predniSONE 10 mg tablet, 4 x 3 days, 3 x 1, 2 x 1, 1 x 1 (Patient not taking: Reported on 1/13/2025), Disp: 18 tablet, Rfl: 0    sucralfate (CARAFATE) 1 g tablet, Take 1 g by mouth 4 (four) times a day as needed (Patient not taking: Reported on 12/5/2024), Disp: , Rfl:     triamcinolone (KENALOG) 0.1 % cream, Apply topically 2 (two) times a day (Patient not taking: Reported on 1/13/2025), Disp: 80 g, Rfl: 0    Current Allergies     Allergies as of 01/13/2025 - Reviewed 01/13/2025   Allergen Reaction Noted    Pollen extract Eye Swelling 01/07/2023              The following portions of the patient's history were reviewed and updated as appropriate: allergies, current medications, past family history, past medical history, past social history, past surgical history and problem list.     Past Medical History:   Diagnosis Date    Alcohol abuse 1/4/2024    Bipolar disorder (HCC)     Diabetes mellitus (HCC)     Hypertension     Psychiatric disorder        History reviewed. No pertinent surgical history.    Family History   Problem Relation Age of Onset    Diabetes Mother     Diabetes Brother          Medications have been verified.        Objective     /98   Pulse (!) 106   Temp 98 °F (36.7 °C)   Resp 18   SpO2 98%   No LMP recorded. Patient is postmenopausal.         Physical Exam     Physical Exam  Vitals and nursing note reviewed.   Constitutional:       General: She is not in acute distress.     Appearance: She  is well-developed. She is not ill-appearing, toxic-appearing or diaphoretic.      Comments: disheveled   HENT:      Head: Normocephalic and atraumatic.   Eyes:      General: Vision grossly intact. Gaze aligned appropriately.      Extraocular Movements: Extraocular movements intact.      Conjunctiva/sclera: Conjunctivae normal.      Pupils: Pupils are equal, round, and reactive to light.   Pulmonary:      Effort: Pulmonary effort is normal. No respiratory distress.   Abdominal:      General: Bowel sounds are normal. There is no distension.      Palpations: Abdomen is soft.      Tenderness: There is no abdominal tenderness.   Musculoskeletal:         General: Normal range of motion.   Skin:     General: Skin is warm and dry.      Capillary Refill: Capillary refill takes less than 2 seconds.   Neurological:      General: No focal deficit present.      Mental Status: She is alert and oriented to person, place, and time.      Cranial Nerves: Cranial nerves 2-12 are intact.      Sensory: Sensation is intact.      Motor: Motor function is intact.      Comments: Unsteady gait on arrival; steady gait before discharging   Psychiatric:         Mood and Affect: Mood normal. Affect is blunt.         Speech: Speech is delayed (initially; this resolved before discharge).         Behavior: Behavior normal. Behavior is cooperative.      Comments: Patient pleasant and cooperative as always.  Initially responses mildly blunted due to acute inebriation but returned to her normal affect before discharge.

## 2025-01-13 NOTE — PATIENT INSTRUCTIONS
If the dizziness would reoccur, you should be assessed at the hospital.  Most likely this dizziness is from the alcohol intoxication.

## 2025-01-14 ENCOUNTER — DOCUMENTATION (OUTPATIENT)
Dept: ADMINISTRATIVE | Facility: OTHER | Age: 50
End: 2025-01-14

## 2025-01-14 LAB
ATRIAL RATE: 106 BPM
P AXIS: 52 DEGREES
PR INTERVAL: 148 MS
QRS AXIS: 28 DEGREES
QRSD INTERVAL: 90 MS
QT INTERVAL: 340 MS
QTC INTERVAL: 452 MS
T WAVE AXIS: 30 DEGREES
VENTRICULAR RATE: 106 BPM

## 2025-01-14 PROCEDURE — 93010 ELECTROCARDIOGRAM REPORT: CPT | Performed by: INTERNAL MEDICINE

## 2025-01-14 NOTE — PROGRESS NOTES
01/14/25 9:18 AM    Patient was called after the Urgent Care visit ; there was no answer/ a message could not be left.    Thank you.  Cayla Khan MA  [facility]

## 2025-01-14 NOTE — PROGRESS NOTES
Blood pressure elevated  Appointment department: [facility]  Appointment provider: CASSIA Martinez  Blood pressure   01/13/25 1540 148/98   01/13/25 1449 150/98

## 2025-01-19 ENCOUNTER — OFFICE VISIT (OUTPATIENT)
Dept: BEHAVIORAL/MENTAL HEALTH CLINIC | Facility: CLINIC | Age: 50
End: 2025-01-19
Payer: COMMERCIAL

## 2025-01-19 DIAGNOSIS — F31.30 BIPOLAR AFFECTIVE DISORDER, CURRENT EPISODE DEPRESSED, CURRENT EPISODE SEVERITY UNSPECIFIED (HCC): ICD-10-CM

## 2025-01-19 DIAGNOSIS — F10.90 ALCOHOL USE, UNSPECIFIED, UNCOMPLICATED: Primary | ICD-10-CM

## 2025-01-19 PROCEDURE — H2011 CRISIS INTERVEN SVC, 15 MIN: HCPCS

## 2025-01-19 NOTE — PROGRESS NOTES
"Assessment      Crisis Intake  Patient Intake  Special Needs: N/A  Living Arrangement: Other (Comment) (Patient is residing in a motel by choice due to not wanting to reside with her cousin anymore due to an argument that transpired last night. Her  from her [employer] has paid for a motel for two days until the patient figures everything out.)  Can patient return home?: No (Patient is residing in a motel by choice due to not wanting to reside with her cousin anymore due to an argument that transpired last night. Her  from her [employer] has paid for a motel for two days until the patient figures everything out.)  Address to be Discharge to:: Local [facility] in [place]  Patient's Telephone Number: [phone]  Access to Firearms: No  Type of Work: Unemployed  Work History: Disabled  School Name: N/A  Unemployed / MA applicant:: N/A  Patient History  Presenting Problems: The patient is a 49 year old  female who presents to the [facility] for Alcohol Use Disorder and environemental stressors stemming from family and housing. The patient was wearing a blue jacket along with a pair of leggings and t-shirt. The patient did have a body odor and appeared dischelved and ungroomed. The writer explored with the patient on what led to her being referred to the [facility] by her [employer]  (Kyree Colunga) which the patient stated \"I want to get back on my meds\" \"Me and my cousin have been fighting\" \"We got into an argument over things and now I'm staying in a motel for two days with my boyfriend\" \"I need to get housing and I need to fill out for housing\" as per patient exact words. The patient reported that her main stressor is stemming from family. The patient reported that she has been residing with her mother, brother, and cousin for the past few years which they have had several arguments in the home. When the writer explored specifically what the arguments were over, the patient stated \"I don't know\" \"I don't remember\". " "The patient reported that her second stressor is stemming from housing. The patient reported that she considers herself \"homeless\" currently, but stated \"I can go back there, but I don't want to go back there\" as per patient exact words. The patient reported that she is stressed out because she need her own apartment needs to fill out an application for housing. The patient reported that she has an extensive history of alcohol use which she reports drinking excessively since the age of 19 years old and the last time that she drank was two days ago which she drank twenty four beers. The patient denied any past legal problems. The patient currently denied all SI/HI/SM/AH/VH. The patient was oriented X4. The patient was not open to being linked to a inpatient substance abuse treatment facility for alcohol use services at this time. The patient was open to receiving services on a outpatient basis.  Treatment History: The patient was previously linked by the [facility] to [facility] for services, but never attended.  Currently in Treatment: No  Medical Problems: See chart  Legal Issues: None  Substance Abuse: Yes (See BH History section for detail) (Patient reports that she has been drinking since the age of 19 years old and the last time that she drank was two days ago 1 case of beer.)  Mental Status Exam  Orientation Level: Appropriate for age, Oriented to person, Oriented to place, Oriented to situation, Oriented to time, Oriented X4  Affect: Flat  Speech: Soft  Mood: Anxious  Thought Content: Delusions (Patient believed that her cousin was on her cell phone which the writer observed that there was no one on the cell phone.)  Hallucination Type: Auditory  Describe Hallucinations: Patient believes that her cousin was on her cell phone when he wasn't. Writer observed cell phone not to have anyone on the cell phone.  Judgement: Fair  Impulse Control: Fair  Attention Span: Appropriate for age  Memory: Intact  Appetite: " Good  Weight Changes: None  Sleep: Good  Total Hours of Sleep: 8  Specific Sleep Problem: None  Appear/Hygiene: Poor hygiene, Body odor, Disheveled  ADL Comments: Patient had a body odor and appeared dischelved.  Strengths and Limitations  Patient Strengths: Good support system, Strong ken  Patient Limitations: Uncooperative, Non-compliance, Poor reasoning ability, Poor insight  Ideations  Current Self Harm/Suicidal Ideation: No  Previous Self Harm/Suicidal Ideation: No  Current homicidal or violent thoughts toward another: Denies ideations within past 6 months  Previous Plans to Harm Another Person: No  Previous History of Violence to Others: No  Provisional Diagnosis  Axis I: Alcohol dependence, uncomplicated F10.20 Bipolar Disorder Unspecified F31.9  Axis II: Deferred  Axis III: See chart    C-SSRS  Jay Suicide Severity Rating Scale  C-SSRS Q1. Wish to be Dead: No - In the Past Month  *Risk Level*: Low Risk      Patient was referred by: Self or Family    Visit start and stop times:    01/19/25  Start Time: 1530  Stop Time: 1705  Total Visit Time: 95 minutes        Discharge and Safety    This writer discussed the patient's current presentation and recommended discharge plan with referrals to [facility], [facility], and information to [facility] for substance abuse services (Patient was given a full overview on the process of admission which a telephone assessment would be completed and transportation would be sent to transport the patient to their facility). The patient agrees with being discharged at this time with referrals and/or information about substance abuse.     The patient declined to complete a safety plan at this time. In addition, the patient was instructed to call local [facility] crisis, other crisis services, 911 or to go to the nearest ER immediately if their situation changes at any time.     This writer discussed discharge plans with the patient and family (  and boyfriend with patient's permission)- who agrees with plan and understands the discharge plans.     SAFETY PLAN  Warning Signs (thoughts, images, mood, behavior, situations) of a potential crisis: Patient continuing to drink excessively and experience hallucinations.      Coping Skills (what can I do to take my mind off the problem, or to keep myself safe): Talking with patient on the effects of excessively drinking alcohol and the patient taking a walk when upset.      Outside Support (who can I reach out to for support and help): [facility]        National Suicide Prevention Hotline:  1-966.586.9184    [facility] [phone] - Crisis  [facility] [phone] - LVF Crisis/Mobile Crisis   [phone] - SLPF Crisis   [facility]: [phone]  [facility]: [phone]  [facility] [phone] - Crisis  [facility] [phone] - Crisis    [facility] [phone] - Crisis  [facility] [phone] - Crisis   [phone] - Peer Support Talk Line (1-9pm daily)  [phone] - Teen Support Talk Line (1-9pm daily)  [phone] - [facility] [phone]- Crisis   [facility] [phone] - Crisis  [facility] [phone] - Crisis   [facility]) [phone] - [facility] Crisis
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
## 2025-01-21 ENCOUNTER — TELEPHONE (OUTPATIENT)
Dept: BEHAVIORAL/MENTAL HEALTH CLINIC | Facility: CLINIC | Age: 50
End: 2025-01-21

## 2025-01-23 ENCOUNTER — TELEPHONE (OUTPATIENT)
Dept: BEHAVIORAL/MENTAL HEALTH CLINIC | Facility: CLINIC | Age: 50
End: 2025-01-23

## 2025-01-28 ENCOUNTER — TELEPHONE (OUTPATIENT)
Dept: FAMILY MEDICINE CLINIC | Facility: CLINIC | Age: 50
End: 2025-01-28

## 2025-02-05 ENCOUNTER — APPOINTMENT (OUTPATIENT)
Dept: RADIOLOGY | Facility: CLINIC | Age: 50
End: 2025-02-05
Payer: COMMERCIAL

## 2025-02-05 ENCOUNTER — OFFICE VISIT (OUTPATIENT)
Dept: URGENT CARE | Facility: CLINIC | Age: 50
End: 2025-02-05
Payer: COMMERCIAL

## 2025-02-05 VITALS
HEART RATE: 88 BPM | TEMPERATURE: 97.6 F | SYSTOLIC BLOOD PRESSURE: 140 MMHG | HEIGHT: 69 IN | DIASTOLIC BLOOD PRESSURE: 78 MMHG | RESPIRATION RATE: 16 BRPM | OXYGEN SATURATION: 98 % | WEIGHT: 243 LBS | BODY MASS INDEX: 35.99 KG/M2

## 2025-02-05 DIAGNOSIS — S99.922A INJURY OF TOE ON LEFT FOOT, INITIAL ENCOUNTER: ICD-10-CM

## 2025-02-05 DIAGNOSIS — S99.922A INJURY OF TOE ON LEFT FOOT, INITIAL ENCOUNTER: Primary | ICD-10-CM

## 2025-02-05 DIAGNOSIS — S90.122A CONTUSION OF LESSER TOE OF LEFT FOOT WITHOUT DAMAGE TO NAIL, INITIAL ENCOUNTER: ICD-10-CM

## 2025-02-05 PROCEDURE — 99213 OFFICE O/P EST LOW 20 MIN: CPT | Performed by: ORTHOPAEDIC SURGERY

## 2025-02-05 PROCEDURE — 73630 X-RAY EXAM OF FOOT: CPT

## 2025-02-05 PROCEDURE — S9088 SERVICES PROVIDED IN URGENT: HCPCS | Performed by: ORTHOPAEDIC SURGERY

## 2025-02-05 RX ORDER — HYDROXYZINE HYDROCHLORIDE 25 MG/1
25 TABLET, FILM COATED ORAL EVERY 6 HOURS PRN
COMMUNITY
Start: 2025-01-29

## 2025-02-05 RX ORDER — RISPERIDONE 2 MG/1
2 TABLET ORAL
COMMUNITY
Start: 2025-01-29 | End: 2025-02-28

## 2025-02-05 NOTE — PROGRESS NOTES
[facility]        NAME: Kathleen Robles is a 49 y.o. female  : 1975    MRN: [id number]  DATE: 2025  TIME: 3:29 PM    Assessment and Plan   Injury of toe on left foot, initial encounter [S99.922A]  1. Injury of toe on left foot, initial encounter  XR foot 3+ vw left      2. Contusion of lesser toe of left foot without damage to nail, initial encounter          XR of the left foot reviewed and discussed with the patient showing significant hallux valgus of the great toe. No fractures or dislocations. No bony lesions.     The patient will be following up with podiatry.     Patient Instructions       Follow up with PCP in 3-5 days.  Proceed to  ER if symptoms worsen.    If tests are performed, our office will contact you with results only if changes need to made to the care plan discussed with you at the visit. You can review your full results on [facility].    Chief Complaint     Chief Complaint   Patient presents with    Toe Injury     Patient c/o left little toe injury after bumping it on bed last night.         History of Present Illness       49-year-old female resents the urgent care for evaluation of acute left pinky toe injury.  The patient states last night she accidentally struck the toe on the edge of her bed causing pain.  She denies any numbness or tingling.        Review of Systems   Review of Systems   Constitutional:  Negative for chills and fever.   HENT:  Negative for ear pain and sore throat.    Eyes:  Negative for pain and visual disturbance.   Respiratory:  Negative for cough and shortness of breath.    Cardiovascular:  Positive for leg swelling. Negative for chest pain and palpitations.   Gastrointestinal:  Negative for abdominal pain and vomiting.   Genitourinary:  Negative for dysuria and hematuria.   Musculoskeletal:  Positive for arthralgias and myalgias. Negative for back pain.   Skin:  Negative for color change and rash.   Neurological:  Negative for  seizures and syncope.   All other systems reviewed and are negative.        Current Medications       Current Outpatient Medications:     albuterol (ProAir HFA) 90 mcg/act inhaler, Inhale 2 puffs every 6 (six) hours as needed for wheezing or shortness of breath, Disp: 18 g, Rfl: 3    hydrOXYzine HCL (ATARAX) 25 mg tablet, Take 25 mg by mouth every 6 (six) hours as needed, Disp: , Rfl:     naltrexone (REVIA) 50 mg tablet, Take 1 tablet (50 mg total) by mouth daily, Disp: 90 tablet, Rfl: 0    risperiDONE (RisperDAL) 2 mg tablet, Take 2 mg by mouth, Disp: , Rfl:     dextromethorphan-guaiFENesin (ROBITUSSIN DM)  mg/5 mL syrup, Take 10 mL by mouth 4 (four) times a day as needed for cough or congestion (Patient not taking: Reported on 2/5/2025), Disp: 236 mL, Rfl: 1    Diclofenac Sodium (Voltaren) 1 %, Apply 2 g topically 4 (four) times a day (Patient not taking: Reported on 2/5/2025), Disp: 600 g, Rfl: 1    glucose blood (OneTouch Ultra) test strip, Use as instructed (Patient not taking: Reported on 2/5/2025), Disp: 100 each, Rfl: 1    loratadine (CLARITIN) 10 mg tablet, Take 1 tablet (10 mg total) by mouth daily (Patient not taking: Reported on 2/5/2025), Disp: 90 tablet, Rfl: 1    metoprolol tartrate (LOPRESSOR) 50 mg tablet, Take 1 tablet (50 mg total) by mouth every 12 (twelve) hours (Patient not taking: Reported on 2/5/2025), Disp: 180 tablet, Rfl: 1    mometasone-formoterol (Dulera) 100-5 MCG/ACT inhaler, Inhale 2 puffs 2 (two) times a day Rinse mouth after use. (Patient not taking: Reported on 2/5/2025), Disp: 13 g, Rfl: 3    montelukast (SINGULAIR) 10 mg tablet, Take 1 tablet (10 mg total) by mouth daily at bedtime (Patient not taking: Reported on 2/5/2025), Disp: 90 tablet, Rfl: 1    nystatin (MYCOSTATIN) cream, Apply topically 2 (two) times a day (Patient not taking: Reported on 2/5/2025), Disp: 30 g, Rfl: 1    nystatin (MYCOSTATIN) powder, Apply topically 3 (three) times a day Until clear for 2-3 days  "(Patient not taking: Reported on 2/5/2025), Disp: 180 g, Rfl: 1    omeprazole (PriLOSEC) 40 MG capsule, Take 1 capsule (40 mg total) by mouth daily (Patient not taking: Reported on 2/5/2025), Disp: 90 capsule, Rfl: 1    polyethylene glycol (GOLYTELY) 4000 mL solution, Take 4,000 mL by mouth once for 1 dose (Patient not taking: Reported on 2/5/2025), Disp: 4000 mL, Rfl: 0    predniSONE 10 mg tablet, 4 x 3 days, 3 x 1, 2 x 1, 1 x 1 (Patient not taking: Reported on 1/4/2025), Disp: 18 tablet, Rfl: 0    sucralfate (CARAFATE) 1 g tablet, Take 1 g by mouth 4 (four) times a day as needed (Patient not taking: Reported on 2/5/2025), Disp: , Rfl:     triamcinolone (KENALOG) 0.1 % cream, Apply topically 2 (two) times a day (Patient not taking: Reported on 1/4/2025), Disp: 80 g, Rfl: 0    Current Allergies     Allergies as of 02/05/2025 - Reviewed 02/05/2025   Allergen Reaction Noted    Pollen extract Eye Swelling 01/07/2023            The following portions of the patient's history were reviewed and updated as appropriate: allergies, current medications, past family history, past medical history, past social history, past surgical history and problem list.     Past Medical History:   Diagnosis Date    Alcohol abuse 1/4/2024    Bipolar disorder (HCC)     Diabetes mellitus (HCC)     Hypertension     Psychiatric disorder        History reviewed. No pertinent surgical history.    Family History   Problem Relation Age of Onset    Diabetes Mother     Diabetes Brother          Medications have been verified.        Objective   /78 (Patient Position: Sitting, Cuff Size: Large)   Pulse 88   Temp 97.6 °F (36.4 °C) (Temporal)   Resp 16   Ht 5' 9\" (1.753 m)   Wt 110 kg (243 lb)   SpO2 98%   BMI 35.88 kg/m²        Physical Exam     Physical Exam  Vitals and nursing note reviewed.   Constitutional:       General: She is not in acute distress.     Appearance: Normal appearance. She is not ill-appearing.   HENT:      Head: " Normocephalic and atraumatic.      Nose: Nose normal.      Mouth/Throat:      Mouth: Mucous membranes are moist.      Pharynx: Oropharynx is clear.   Eyes:      Extraocular Movements: Extraocular movements intact.      Pupils: Pupils are equal, round, and reactive to light.   Cardiovascular:      Rate and Rhythm: Normal rate and regular rhythm.      Pulses: Normal pulses.   Pulmonary:      Effort: Pulmonary effort is normal. No respiratory distress.   Musculoskeletal:         General: Normal range of motion.      Cervical back: Normal range of motion.      Comments: Patient able to stand negative on her own.  She was talking without difficulty.  There is a significant hallux valgus of the great toe, hammertoe of the second toe.  The pinky toe is without erythema, swelling, bruising.  Nail intact.  No significant tenderness throughout the toe.  The lower extremity is neurovascularly intact.  It is noted the patient does have 2+ pitting edema bilaterally.  She denies any shortness of breath, chest pain, dizziness, cough.  No history of CHF.   Skin:     General: Skin is warm and dry.      Capillary Refill: Capillary refill takes less than 2 seconds.   Neurological:      General: No focal deficit present.      Mental Status: She is alert and oriented to person, place, and time.   Psychiatric:         Mood and Affect: Mood normal.         Behavior: Behavior normal.

## 2025-02-05 NOTE — PATIENT INSTRUCTIONS
Pain relief:   Tylenol   Motrin   Ice   Elevation   Supportive footwear  Follow up with podiatrist

## 2025-02-11 ENCOUNTER — OFFICE VISIT (OUTPATIENT)
Dept: URGENT CARE | Facility: CLINIC | Age: 50
End: 2025-02-11
Payer: COMMERCIAL

## 2025-02-11 VITALS
WEIGHT: 238 LBS | TEMPERATURE: 98 F | DIASTOLIC BLOOD PRESSURE: 76 MMHG | BODY MASS INDEX: 35.25 KG/M2 | RESPIRATION RATE: 18 BRPM | SYSTOLIC BLOOD PRESSURE: 126 MMHG | OXYGEN SATURATION: 99 % | HEART RATE: 110 BPM | HEIGHT: 69 IN

## 2025-02-11 DIAGNOSIS — B37.2 CANDIDAL DERMATITIS: ICD-10-CM

## 2025-02-11 DIAGNOSIS — B37.89 CANDIDA RASH OF GROIN: Primary | ICD-10-CM

## 2025-02-11 PROCEDURE — 99213 OFFICE O/P EST LOW 20 MIN: CPT | Performed by: NURSE PRACTITIONER

## 2025-02-11 PROCEDURE — S9088 SERVICES PROVIDED IN URGENT: HCPCS | Performed by: NURSE PRACTITIONER

## 2025-02-11 RX ORDER — ERGOCALCIFEROL 1.25 MG/1
1 CAPSULE ORAL WEEKLY
COMMUNITY
Start: 2025-02-04 | End: 2025-04-29

## 2025-02-11 RX ORDER — LANOLIN ALCOHOL/MO/W.PET/CERES
100 CREAM (GRAM) TOPICAL DAILY
COMMUNITY
Start: 2025-01-23 | End: 2026-01-23

## 2025-02-11 RX ORDER — FLUCONAZOLE 200 MG/1
200 TABLET ORAL DAILY
Qty: 10 TABLET | Refills: 0 | Status: SHIPPED | OUTPATIENT
Start: 2025-02-11 | End: 2025-02-21

## 2025-02-11 NOTE — PROGRESS NOTES
[facility]        NAME: Kathleen Robles is a 49 y.o. female  : 1975    MRN: [id number]  DATE: 2025  TIME: 5:35 PM      Assessment and Plan     Candida rash of groin [B37.89]  1. Candida rash of groin  fluconazole (DIFLUCAN) 200 mg tablet      2. Candidal dermatitis  fluconazole (DIFLUCAN) 200 mg tablet            Patient Instructions     Patient Instructions   Continue the topical antifungals (nystatin) 2x/day until the skin looks perfect for a full 2-3 days.  You may also take the oral antifungal to help get the rash under better control.    Follow up with PCP in 3-5 days.  Proceed to  ER if symptoms worsen.    Chief Complaint     Chief Complaint   Patient presents with    Rash     Started 23 with burning feeling under both breast. Worse under left breast. Also around underwear area.           History of Present Illness     Patient presents accompanied by significant other for rash under bilateral breasts and under left aspect of pannus.  She has been applying antifungal cream twice a day but states that is getting worse rather than better.  This rash was present when she had a recent inpatient stay and had improved with powder use then, but she states the rash has steadily worsened.  Reviewed that she has in recent months been ordered both nystatin and triamcinolone, and directed her to double check that what she is using is the triamcinolone.    She reports that she is taking her mental health meds and that she is now 13 days sober from alcohol.  She states she feels better, and her abdominal pain is gone.  She had an intake appointment with outpatient mental health today.  She states that they helped arrange transportation for her.  She shares that while her brother does drink, he has told her that he will not do so in the house in support of her sobriety.    She asks about the pill for the fungal rash, noting that her mother has had similar rashes before and simply been given  a pill.  She notes that her liver function tests have much improved since becoming sober.        Review of Systems     Review of Systems   Skin:  Positive for rash.   All other systems reviewed and are negative.        Current Medications       Current Outpatient Medications:     albuterol (ProAir HFA) 90 mcg/act inhaler, Inhale 2 puffs every 6 (six) hours as needed for wheezing or shortness of breath, Disp: 18 g, Rfl: 3    fluconazole (DIFLUCAN) 200 mg tablet, Take 1 tablet (200 mg total) by mouth daily for 10 days, Disp: 10 tablet, Rfl: 0    risperiDONE (RisperDAL) 2 mg tablet, Take 2 mg by mouth, Disp: , Rfl:     thiamine 100 MG tablet, Take 100 mg by mouth daily, Disp: , Rfl:     ergocalciferol (Drisdol) 1.25 MG (47793 UT) capsule, Take 1 capsule by mouth once a week (Patient not taking: Reported on 2/11/2025), Disp: , Rfl:     hydrOXYzine HCL (ATARAX) 25 mg tablet, Take 25 mg by mouth every 6 (six) hours as needed (Patient not taking: Reported on 2/11/2025), Disp: , Rfl:     loratadine (CLARITIN) 10 mg tablet, Take 1 tablet (10 mg total) by mouth daily (Patient not taking: Reported on 2/11/2025), Disp: 90 tablet, Rfl: 1    metoprolol tartrate (LOPRESSOR) 50 mg tablet, Take 1 tablet (50 mg total) by mouth every 12 (twelve) hours (Patient not taking: Reported on 2/11/2025), Disp: 180 tablet, Rfl: 1    montelukast (SINGULAIR) 10 mg tablet, Take 1 tablet (10 mg total) by mouth daily at bedtime (Patient not taking: Reported on 2/11/2025), Disp: 90 tablet, Rfl: 1    naltrexone (REVIA) 50 mg tablet, Take 1 tablet (50 mg total) by mouth daily (Patient not taking: Reported on 2/11/2025), Disp: 90 tablet, Rfl: 0    sucralfate (CARAFATE) 1 g tablet, Take 1 g by mouth 4 (four) times a day as needed (Patient not taking: Reported on 12/5/2024), Disp: , Rfl:     Current Allergies     Allergies as of 02/11/2025 - Reviewed 02/11/2025   Allergen Reaction Noted    Pollen extract Eye Swelling 01/07/2023              The following  "portions of the patient's history were reviewed and updated as appropriate: allergies, current medications, past family history, past medical history, past social history, past surgical history and problem list.     Past Medical History:   Diagnosis Date    Alcohol abuse 1/4/2024    Bipolar disorder (HCC)     Diabetes mellitus (HCC)     Hypertension     Psychiatric disorder        No past surgical history on file.    Family History   Problem Relation Age of Onset    Diabetes Mother     Diabetes Brother          Medications have been verified.        Objective     /76   Pulse (!) 110   Temp 98 °F (36.7 °C)   Resp 18   Ht 5' 9\" (1.753 m)   Wt 108 kg (238 lb)   SpO2 99%   BMI 35.15 kg/m²   No LMP recorded. Patient is postmenopausal.         Physical Exam     Physical Exam  Vitals and nursing note reviewed.   Constitutional:       General: She is not in acute distress.     Appearance: Normal appearance. She is well-developed. She is not ill-appearing, toxic-appearing or diaphoretic.   HENT:      Head: Normocephalic and atraumatic.   Pulmonary:      Effort: Pulmonary effort is normal. No respiratory distress.   Abdominal:      General: There is no distension.      Palpations: Abdomen is soft.   Musculoskeletal:         General: Normal range of motion.   Skin:     General: Skin is warm and dry.      Capillary Refill: Capillary refill takes less than 2 seconds.      Findings: Rash present.      Comments: Fungal rash under bilateral breasts and under left aspect of pannus.  Red and moist.   Neurological:      General: No focal deficit present.      Mental Status: She is alert and oriented to person, place, and time.   Psychiatric:         Mood and Affect: Mood and affect normal.         Behavior: Behavior normal. Behavior is cooperative.         Thought Content: Thought content normal.         Judgment: Judgment normal.       "

## 2025-02-11 NOTE — PATIENT INSTRUCTIONS
Continue the topical antifungals (nystatin) 2x/day until the skin looks perfect for a full 2-3 days.  You may also take the oral antifungal to help get the rash under better control.

## 2025-02-18 ENCOUNTER — OFFICE VISIT (OUTPATIENT)
Dept: FAMILY MEDICINE CLINIC | Facility: CLINIC | Age: 50
End: 2025-02-18
Payer: COMMERCIAL

## 2025-02-18 VITALS
WEIGHT: 236.6 LBS | SYSTOLIC BLOOD PRESSURE: 134 MMHG | RESPIRATION RATE: 18 BRPM | OXYGEN SATURATION: 99 % | TEMPERATURE: 98 F | BODY MASS INDEX: 35.04 KG/M2 | HEART RATE: 86 BPM | HEIGHT: 69 IN | DIASTOLIC BLOOD PRESSURE: 78 MMHG

## 2025-02-18 DIAGNOSIS — Z59.82 TRANSPORTATION INSECURITY: ICD-10-CM

## 2025-02-18 DIAGNOSIS — E53.8 B12 DEFICIENCY: ICD-10-CM

## 2025-02-18 DIAGNOSIS — F10.20 ALCOHOL USE DISORDER, MODERATE, DEPENDENCE (HCC): Primary | ICD-10-CM

## 2025-02-18 DIAGNOSIS — R73.03 PRE-DIABETES: ICD-10-CM

## 2025-02-18 DIAGNOSIS — F31.32 BIPOLAR 1 DISORDER, DEPRESSED, MODERATE (HCC): Chronic | ICD-10-CM

## 2025-02-18 DIAGNOSIS — F10.951: ICD-10-CM

## 2025-02-18 DIAGNOSIS — F10.10 ALCOHOL ABUSE: ICD-10-CM

## 2025-02-18 PROBLEM — F31.9 BIPOLAR DEPRESSION (HCC): Status: ACTIVE | Noted: 2025-01-24

## 2025-02-18 PROCEDURE — 99214 OFFICE O/P EST MOD 30 MIN: CPT | Performed by: INTERNAL MEDICINE

## 2025-02-18 PROCEDURE — 96372 THER/PROPH/DIAG INJ SC/IM: CPT | Performed by: INTERNAL MEDICINE

## 2025-02-18 RX ORDER — CYANOCOBALAMIN 1000 UG/ML
1000 INJECTION, SOLUTION INTRAMUSCULAR; SUBCUTANEOUS
Status: SHIPPED | OUTPATIENT
Start: 2025-02-18

## 2025-02-18 RX ADMIN — CYANOCOBALAMIN 1000 MCG: 1000 INJECTION, SOLUTION INTRAMUSCULAR; SUBCUTANEOUS at 12:16

## 2025-02-18 SDOH — ECONOMIC STABILITY - TRANSPORTATION SECURITY: TRANSPORTATION INSECURITY: Z59.82

## 2025-02-18 NOTE — PROGRESS NOTES
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)    Orders:  •  Hemoglobin A1C; Future  •  Comprehensive metabolic panel; Future  •  CBC and differential; Future  •  Lipid Panel with Direct LDL reflex; Future    Pre-diabetes    Orders:  •  Hemoglobin A1C; Future  •  Comprehensive metabolic panel; Future    Transportation insecurity    Orders:  •  Ambulatory referral to social work care management program; Future    B12 deficiency    Orders:  •  Comprehensive metabolic panel; Future  •  CBC and differential; Future  •  cyanocobalamin injection 1,000 mcg  •  Vitamin B12; Future           History of Present Illness   50yo female with history of HTN, pre-diabetes, alcohol use disorder here for hospital follow up.     Pt went to ER  when police were called and she was having visual hallucinations, alcohol withdrawal. Admitted to [facility] inpatient behavioral health  - . Doing well, hasn't had a drink since prior to admission. Denies cravings or thoughts of drinking. Outpatient psychiatry with [facility] in [place]. Referred for 1:1 counseling. Not attending any group or peer support programs. Support system includes mother, significant other. Taking naltrexone.       Review of Systems   Constitutional:  Negative for appetite change, chills, fatigue and fever.   Respiratory:  Negative for chest tightness and shortness of breath.    Gastrointestinal:  Negative  "for abdominal distention, abdominal pain, nausea and vomiting.   Psychiatric/Behavioral:  Negative for dysphoric mood, hallucinations and sleep disturbance. The patient is not nervous/anxious.        Objective   /78   Pulse 86   Temp 98 °F (36.7 °C) (Temporal)   Resp 18   Ht 5' 9\" (1.753 m)   Wt 107 kg (236 lb 9.6 oz)   SpO2 99%   BMI 34.94 kg/m²      Physical Exam  Vitals reviewed.   Constitutional:       General: She is not in acute distress.     Appearance: She is obese.   Pulmonary:      Effort: Pulmonary effort is normal. No respiratory distress.   Neurological:      General: No focal deficit present.      Mental Status: She is alert.      Motor: No weakness.      Gait: Gait normal.   Psychiatric:         Mood and Affect: Mood and affect normal.         Behavior: Behavior normal. Behavior is cooperative.         "

## 2025-02-18 NOTE — ASSESSMENT & PLAN NOTE
Orders:  •  Hemoglobin A1C; Future  •  Comprehensive metabolic panel; Future  •  CBC and differential; Future  •  Lipid Panel with Direct LDL reflex; Future

## 2025-02-18 NOTE — ASSESSMENT & PLAN NOTE
- in early remission, continue oral naltrexone, encouraged to try peer support or group therapy for LAMIN   Orders:  •  Comprehensive metabolic panel; Future  •  CBC and differential; Future

## 2025-02-18 NOTE — ASSESSMENT & PLAN NOTE
Orders:  •  Comprehensive metabolic panel; Future  •  CBC and differential; Future  •  cyanocobalamin injection 1,000 mcg  •  Vitamin B12; Future

## 2025-02-20 ENCOUNTER — PATIENT OUTREACH (OUTPATIENT)
Dept: CASE MANAGEMENT | Facility: OTHER | Age: 50
End: 2025-02-20

## 2025-02-20 ENCOUNTER — TELEPHONE (OUTPATIENT)
Dept: BEHAVIORAL/MENTAL HEALTH CLINIC | Facility: CLINIC | Age: 50
End: 2025-02-20

## 2025-02-20 NOTE — TELEPHONE ENCOUNTER
Left voicemail informing patient and/or parent/guardian of the Psych Encounter form needing to be signed as a requirement from the insurance company for billing purposes. Patient can access form via [email or web address] and sign electronically.     Please make patient aware this form must be signed for each visit as a requirement to continue future visits with provider.

## 2025-02-20 NOTE — PROGRESS NOTES
NICOLE CERVANTES received a referral from patient's PCP as noted patient was identified with at risk responses for SDOH. NICOLE CERVANTES reviewed patient's chart and called patient ([phone]). Patient is interested in speaking with NICOLE CERVANTES but is requesting NICOLE CERVANTES call back at a later date. NICOLE CERVANTES will follow up regarding.

## 2025-02-21 ENCOUNTER — TELEPHONE (OUTPATIENT)
Dept: BEHAVIORAL/MENTAL HEALTH CLINIC | Facility: CLINIC | Age: 50
End: 2025-02-21

## 2025-02-28 ENCOUNTER — PATIENT OUTREACH (OUTPATIENT)
Dept: CASE MANAGEMENT | Facility: OTHER | Age: 50
End: 2025-02-28

## 2025-02-28 NOTE — PROGRESS NOTES
NICOLE CERVANTES received a referral from patient's PCP as noted patient was identified with at risk responses for SDOH. NICOLE CERVANTES reviewed patient's chart and called patient ([phone]) a second time. Patient answered and NICOLE CERVANTES introduced role and reason for calling. Patient agreeable to completing SOC psychosocial assessment with NICOLE CERVANTES.     Patient lives with family in a rental and receives SSDI and SNAP. Patient reports no current financial difficulties but would like to complete LIHEAP application but states she needs assistance. Patient reports she is independent with all ADLs and IADLs. Patient stated that her family drives her to appointments or she utilizes the county transport. Patient reports that she does she a mental health therapist outpatient.     Patient is agreeable to referral to [facility] to assist with LIHEAP. NICOLE CERVANTES to place referral.

## 2025-03-03 ENCOUNTER — OFFICE VISIT (OUTPATIENT)
Dept: URGENT CARE | Facility: CLINIC | Age: 50
End: 2025-03-03
Payer: COMMERCIAL

## 2025-03-03 VITALS
HEART RATE: 89 BPM | HEIGHT: 69 IN | TEMPERATURE: 98.3 F | OXYGEN SATURATION: 100 % | DIASTOLIC BLOOD PRESSURE: 86 MMHG | WEIGHT: 240 LBS | RESPIRATION RATE: 18 BRPM | SYSTOLIC BLOOD PRESSURE: 138 MMHG | BODY MASS INDEX: 35.55 KG/M2

## 2025-03-03 DIAGNOSIS — S16.1XXA STRAIN OF NECK MUSCLE, INITIAL ENCOUNTER: Primary | ICD-10-CM

## 2025-03-03 PROCEDURE — S9088 SERVICES PROVIDED IN URGENT: HCPCS | Performed by: ORTHOPAEDIC SURGERY

## 2025-03-03 PROCEDURE — 99213 OFFICE O/P EST LOW 20 MIN: CPT | Performed by: ORTHOPAEDIC SURGERY

## 2025-03-03 RX ORDER — NAPROXEN 500 MG/1
500 TABLET ORAL 2 TIMES DAILY WITH MEALS
Qty: 30 TABLET | Refills: 0 | Status: SHIPPED | OUTPATIENT
Start: 2025-03-03

## 2025-03-03 RX ORDER — RISPERIDONE 2 MG/1
2 TABLET ORAL
COMMUNITY
Start: 2025-02-25 | End: 2025-03-27

## 2025-03-03 RX ORDER — METHOCARBAMOL 500 MG/1
500 TABLET, FILM COATED ORAL 4 TIMES DAILY PRN
Qty: 20 TABLET | Refills: 0 | Status: SHIPPED | OUTPATIENT
Start: 2025-03-03

## 2025-03-03 NOTE — PATIENT INSTRUCTIONS
Pain relief:  Take meloxicam (Mobic) or naproxen as prescribed  These are non-steroidal anti-inflammatories (NSAIDs). Ask your primary care provider before you take NSAIDs if you are on any blood thinners, or if you have a history of heart disease, kidney disease, gastric bypass surgery, GI bleed, or poorly controlled high blood pressure.    Acetaminophen (Tylenol) 1,000mg every 6-8 hours   Do not take more than 4,000mg of Tylenol a day  Over-the-counter lidocaine patches, Icyhot patches  Bengay   Compressive braces/wraps  Alternating cool compresses and heating pad  If prescribed, take Robaxin as directed  Robaxin is a muscle relaxer, indicated for muscle strains  Do not drive or operate heavy machinery while taken Robaxin as it may make you drowsy  Gentle home exercises/stretches  Follow up with chiropractic or Spine and Pain Management if your symptoms do not improve  Proceed to the ED if symptoms worsen

## 2025-03-06 ENCOUNTER — PATIENT OUTREACH (OUTPATIENT)
Dept: CASE MANAGEMENT | Facility: OTHER | Age: 50
End: 2025-03-06

## 2025-03-06 NOTE — PROGRESS NOTES
[facility]        NAME: Kathleen Robles is a 49 y.o. female  : 1975    MRN: [id number]  DATE: 2025  TIME: 3:24 PM    Assessment and Plan   Strain of neck muscle, initial encounter [S16.1XXA]  1. Strain of neck muscle, initial encounter  methocarbamol (ROBAXIN) 500 mg tablet    naproxen (Naprosyn) 500 mg tablet            Patient Instructions     Pain relief:  Take meloxicam (Mobic) or naproxen as prescribed  These are non-steroidal anti-inflammatories (NSAIDs). Ask your primary care provider before you take NSAIDs if you are on any blood thinners, or if you have a history of heart disease, kidney disease, gastric bypass surgery, GI bleed, or poorly controlled high blood pressure.    Acetaminophen (Tylenol) 1,000mg every 6-8 hours   Do not take more than 4,000mg of Tylenol a day  Over-the-counter lidocaine patches, Icyhot patches  Bengay   Compressive braces/wraps  Alternating cool compresses and heating pad  If prescribed, take Robaxin as directed  Robaxin is a muscle relaxer, indicated for muscle strains  Do not drive or operate heavy machinery while taken Robaxin as it may make you drowsy  Gentle home exercises/stretches  Follow up with chiropractic or Spine and Pain Management if your symptoms do not improve  Proceed to the ED if symptoms worsen    If tests are performed, our office will contact you with results only if changes need to made to the care plan discussed with you at the visit. [facility]    Chief Complaint     Chief Complaint   Patient presents with    Neck Pain     Started two days ago. No trauma to the area.          History of Present Illness       49-year-old female presents to the urgent care for evaluation of left-sided neck pain.  Symptoms started 2 days ago when she woke up.  The patient denies any injury or trauma.  She denies any numbness or tingling down the upper extremity.  The pain is made worse with movement of the  neck and when lying down.  For symptom relief she has tried over-the-counter topical and oral pain relief.  She denies any fevers or chills.  No recent illness.        Review of Systems   Review of Systems   Constitutional:  Negative for chills and fever.   HENT:  Negative for ear pain and sore throat.    Eyes:  Negative for pain and visual disturbance.   Respiratory:  Negative for cough and shortness of breath.    Cardiovascular:  Negative for chest pain and palpitations.   Gastrointestinal:  Negative for abdominal pain and vomiting.   Genitourinary:  Negative for dysuria and hematuria.   Musculoskeletal:  Positive for neck pain. Negative for arthralgias and back pain.   Skin:  Negative for color change and rash.   Neurological:  Negative for seizures and syncope.   All other systems reviewed and are negative.        Current Medications       Current Outpatient Medications:     albuterol (ProAir HFA) 90 mcg/act inhaler, Inhale 2 puffs every 6 (six) hours as needed for wheezing or shortness of breath, Disp: 18 g, Rfl: 3    ergocalciferol (Drisdol) 1.25 MG (28507 UT) capsule, Take 1 capsule by mouth once a week, Disp: , Rfl:     hydrOXYzine HCL (ATARAX) 25 mg tablet, Take 25 mg by mouth every 6 (six) hours as needed, Disp: , Rfl:     loratadine (CLARITIN) 10 mg tablet, Take 1 tablet (10 mg total) by mouth daily, Disp: 90 tablet, Rfl: 1    methocarbamol (ROBAXIN) 500 mg tablet, Take 1 tablet (500 mg total) by mouth 4 (four) times a day as needed for muscle spasms, Disp: 20 tablet, Rfl: 0    metoprolol tartrate (LOPRESSOR) 50 mg tablet, Take 1 tablet (50 mg total) by mouth every 12 (twelve) hours, Disp: 180 tablet, Rfl: 1    montelukast (SINGULAIR) 10 mg tablet, Take 1 tablet (10 mg total) by mouth daily at bedtime, Disp: 90 tablet, Rfl: 1    naltrexone (REVIA) 50 mg tablet, Take 1 tablet (50 mg total) by mouth daily, Disp: 90 tablet, Rfl: 0    naproxen (Naprosyn) 500 mg tablet, Take 1 tablet (500 mg total) by mouth 2  "(two) times a day with meals, Disp: 30 tablet, Rfl: 0    risperiDONE (RisperDAL) 2 mg tablet, Take 2 mg by mouth, Disp: , Rfl:     sucralfate (CARAFATE) 1 g tablet, Take 1 g by mouth 4 (four) times a day as needed, Disp: , Rfl:     thiamine 100 MG tablet, Take 100 mg by mouth daily, Disp: , Rfl:     Current Facility-Administered Medications:     cyanocobalamin injection 1,000 mcg, 1,000 mcg, Intramuscular, Q30 Days, , 1,000 mcg at 02/18/25 1216    Current Allergies     Allergies as of 03/03/2025 - Reviewed 03/03/2025   Allergen Reaction Noted    Pollen extract Eye Swelling 01/07/2023            The following portions of the patient's history were reviewed and updated as appropriate: allergies, current medications, past family history, past medical history, past social history, past surgical history and problem list.     Past Medical History:   Diagnosis Date    Alcohol abuse 1/4/2024    Bipolar disorder (HCC)     Diabetes mellitus (HCC)     Hypertension     Psychiatric disorder        No past surgical history on file.    Family History   Problem Relation Age of Onset    Diabetes Mother     Diabetes Brother          Medications have been verified.        Objective   /86   Pulse 89   Temp 98.3 °F (36.8 °C)   Resp 18   Ht 5' 9\" (1.753 m)   Wt 109 kg (240 lb)   SpO2 100%   BMI 35.44 kg/m²        Physical Exam     Physical Exam  Vitals and nursing note reviewed.   Constitutional:       General: She is not in acute distress.     Appearance: Normal appearance. She is not ill-appearing.   HENT:      Head: Normocephalic and atraumatic.      Nose: Nose normal.      Mouth/Throat:      Mouth: Mucous membranes are moist.      Pharynx: Oropharynx is clear.   Eyes:      Extraocular Movements: Extraocular movements intact.      Pupils: Pupils are equal, round, and reactive to light.   Cardiovascular:      Rate and Rhythm: Normal rate.      Pulses: Normal pulses.   Pulmonary:      Effort: Pulmonary effort is normal. No " respiratory distress.   Musculoskeletal:         General: Normal range of motion.      Cervical back: Normal range of motion.      Comments: Cervical spine: Tenderness along the left paraspinal musculature radiating to the trapezius.  Patient has full active cervical spine range of motion, though complains of pain with lateral bending and rotation to the left.  The upper extremity is neurovascularly intact.   Skin:     General: Skin is warm and dry.      Capillary Refill: Capillary refill takes less than 2 seconds.   Neurological:      General: No focal deficit present.      Mental Status: She is alert and oriented to person, place, and time.   Psychiatric:         Mood and Affect: Mood normal.         Behavior: Behavior normal.

## 2025-03-06 NOTE — PROGRESS NOTES
NICOLE CERVANTES received incoming message from Vero TEAGUE CMEARNEST who stated that she assigned herself to patient and requested that NICOLE CERVANTES assign her to CMOC tasks on chart NICOLE CERVANTES did so and sent Vero an outgoing message regarding. NICOLE CERVANTES will continue to follow.

## 2025-03-07 ENCOUNTER — PATIENT OUTREACH (OUTPATIENT)
Dept: CASE MANAGEMENT | Facility: OTHER | Age: 50
End: 2025-03-07

## 2025-03-07 NOTE — PROGRESS NOTES
CMOC received a referral from BILLY Mathew to assist patient with applying for LIHEAP.     CMOC attempted to contact Kathleen to discuss the referral.   No answer. Left message on voicemail with reason for call, this writer's contact information, and a request for a call back to assist.     Will outreach next week if no contact prior.

## 2025-03-11 ENCOUNTER — PATIENT OUTREACH (OUTPATIENT)
Dept: CASE MANAGEMENT | Facility: OTHER | Age: 50
End: 2025-03-11

## 2025-03-11 NOTE — PROGRESS NOTES
[facility]    She's agreeable to services so an application was completed via [employer] website (e-form# [id number]).  Informed Kathleen she will likely receive correspondence from [employer] with any additional questions or documents they may request and she expressed understanding.     No other questions or needs currently.    Will outreach next week for a status update.

## 2025-03-16 ENCOUNTER — OFFICE VISIT (OUTPATIENT)
Dept: URGENT CARE | Facility: CLINIC | Age: 50
End: 2025-03-16
Payer: COMMERCIAL

## 2025-03-16 ENCOUNTER — APPOINTMENT (OUTPATIENT)
Dept: RADIOLOGY | Facility: CLINIC | Age: 50
End: 2025-03-16
Payer: COMMERCIAL

## 2025-03-16 VITALS
TEMPERATURE: 98.6 F | DIASTOLIC BLOOD PRESSURE: 84 MMHG | SYSTOLIC BLOOD PRESSURE: 132 MMHG | HEIGHT: 69 IN | RESPIRATION RATE: 18 BRPM | BODY MASS INDEX: 37.62 KG/M2 | HEART RATE: 100 BPM | OXYGEN SATURATION: 99 % | WEIGHT: 254 LBS

## 2025-03-16 DIAGNOSIS — S69.91XA INJURY OF RIGHT HAND, INITIAL ENCOUNTER: ICD-10-CM

## 2025-03-16 DIAGNOSIS — S60.221A CONTUSION OF DORSUM OF RIGHT HAND: Primary | ICD-10-CM

## 2025-03-16 PROCEDURE — 99214 OFFICE O/P EST MOD 30 MIN: CPT

## 2025-03-16 PROCEDURE — 73130 X-RAY EXAM OF HAND: CPT

## 2025-03-16 PROCEDURE — S9088 SERVICES PROVIDED IN URGENT: HCPCS

## 2025-03-16 NOTE — PROGRESS NOTES
[facility]        NAME: Kathleen Robles is a 49 y.o. female  : 1975    MRN: [id number]  DATE: 2025  TIME: 11:26 AM    Assessment and Plan   Contusion of dorsum of right hand [S60.221A]  1. Contusion of dorsum of right hand        2. Injury of right hand, initial encounter  XR hand 3+ vw right        XR right hand performed in office. Xray imaging reviewed and negative for any acute osseous abnormality. Pending final read from radiology.  Discussed with patient symptoms are consistent with a contusion of the dorsum to her right hand. Recommend OTC Tylenol/ibuprofen as needed for pain and RICE.  Instructed patient to follow-up with PCP for no improvement or worsening of symptoms.  Patient educated on red flag symptoms and when to proceed to the ED.  Patient agreeable and understands current treatment plan.     Patient Instructions     Patient Instructions   May take OTC Tylenol or Ibuprofen as needed for pain.   May apply OTC Lidoderm, Biofreeze, IcyHot, etc. as needed for pain.      RICE is an acronym for a first aid treatment method that involves rest, ice, compression, and elevation.  It is commonly used in the treatment of sprains, strains, contusions, dislocations, or uncomplicated fractures.  It can help to relieve pain and swelling, promote flexibility, and speed up healing.     Rest - avoid using the injured area and stop the activity that caused the injury  Ice - apply an ice pack or cold gel pack wrapped in a towel to the injured area for 20 minutes every 4 hours   Compression - wrap the injured area with an elastic bandage (ACE) to reduce swelling and provide support  Elevation - keep the injured area raised above the level of the heart to drain fluid and reduce swelling      We have performed some tests on you during your visit with us. Our office will contact you with these results only if changes need to be made to the care plan previously discussed with you at your visit. You  can [facility] your results on [facility]. Please don't hesitate to call if you have any questions regarding your results and/or treatment.     If your symptoms do not improve with our current treatment plan or worsen, please schedule an appointment with your PCP. If you develop any high or persistent fevers, chest pain, palpitations, shortness of breath, difficulty swallowing, decreased urine output, abdominal pain, dizziness or any other concerning symptoms, please proceed to the ED.       Follow up with PCP in 3-5 days.  Proceed to  ER if symptoms worsen.    Chief Complaint     Chief Complaint   Patient presents with   • Hand Pain     Right hand pain after hitting the table.          History of Present Illness       49-year-old female presents to the clinic for evaluation of right hand pain x 2 days.  Patient reports she hit her hand on a table yesterday and began to experience some pain in the right hand.  After her injury she does states she had a little bit of swelling and some mild redness however denies any bruising, numbness or tingling to the hand.  She reports the pain is located on her right lateral hand.  She describes it as dull and aching in nature however can be sharp and shooting at times.  She currently rates the pain a 6 out of 10.  She reports she has full range of motion to the hand however does endorse pain with movement however.  She denies taking any OTC medications for symptoms.  She also denies applying any ice to the area or elevation.          Hand Pain   The incident occurred 12 to 24 hours ago. The incident occurred at home. The injury mechanism was a direct blow. The pain is present in the right hand. The quality of the pain is described as aching and shooting. The pain does not radiate. The pain is at a severity of 6/10. The pain has been Constant since the incident. Pertinent negatives include no chest pain. The symptoms are aggravated by movement, lifting and palpation. She has  tried nothing for the symptoms.       Review of Systems   Review of Systems   Constitutional:  Negative for chills and fever.   HENT:  Negative for congestion, ear pain and sore throat.    Respiratory:  Negative for cough and shortness of breath.    Cardiovascular:  Negative for chest pain and palpitations.   Gastrointestinal:  Negative for abdominal pain, diarrhea, nausea and vomiting.   Musculoskeletal:  Positive for arthralgias (right hand) and joint swelling.   Skin:  Positive for color change (mild redness to right hand, no bruising).   Neurological:  Negative for dizziness, tremors, seizures, weakness, light-headedness and headaches.         Current Medications       Current Outpatient Medications:   •  albuterol (ProAir HFA) 90 mcg/act inhaler, Inhale 2 puffs every 6 (six) hours as needed for wheezing or shortness of breath, Disp: 18 g, Rfl: 3  •  ergocalciferol (Drisdol) 1.25 MG (39397 UT) capsule, Take 1 capsule by mouth once a week, Disp: , Rfl:   •  hydrOXYzine HCL (ATARAX) 25 mg tablet, Take 25 mg by mouth every 6 (six) hours as needed, Disp: , Rfl:   •  loratadine (CLARITIN) 10 mg tablet, Take 1 tablet (10 mg total) by mouth daily, Disp: 90 tablet, Rfl: 1  •  methocarbamol (ROBAXIN) 500 mg tablet, Take 1 tablet (500 mg total) by mouth 4 (four) times a day as needed for muscle spasms, Disp: 20 tablet, Rfl: 0  •  metoprolol tartrate (LOPRESSOR) 50 mg tablet, Take 1 tablet (50 mg total) by mouth every 12 (twelve) hours, Disp: 180 tablet, Rfl: 1  •  montelukast (SINGULAIR) 10 mg tablet, Take 1 tablet (10 mg total) by mouth daily at bedtime, Disp: 90 tablet, Rfl: 1  •  naltrexone (REVIA) 50 mg tablet, Take 1 tablet (50 mg total) by mouth daily, Disp: 90 tablet, Rfl: 0  •  naproxen (Naprosyn) 500 mg tablet, Take 1 tablet (500 mg total) by mouth 2 (two) times a day with meals, Disp: 30 tablet, Rfl: 0  •  risperiDONE (RisperDAL) 2 mg tablet, Take 2 mg by mouth, Disp: , Rfl:   •  sucralfate (CARAFATE) 1 g  "tablet, Take 1 g by mouth 4 (four) times a day as needed, Disp: , Rfl:   •  thiamine 100 MG tablet, Take 100 mg by mouth daily, Disp: , Rfl:     Current Facility-Administered Medications:   •  cyanocobalamin injection 1,000 mcg, 1,000 mcg, Intramuscular, Q30 Days, , 1,000 mcg at 02/18/25 1216    Current Allergies     Allergies as of 03/16/2025 - Reviewed 03/16/2025   Allergen Reaction Noted   • Pollen extract Eye Swelling 01/07/2023            The following portions of the patient's history were reviewed and updated as appropriate: allergies, current medications, past family history, past medical history, past social history, past surgical history and problem list.     Past Medical History:   Diagnosis Date   • Alcohol abuse 1/4/2024   • Bipolar disorder (HCC)    • Diabetes mellitus (HCC)    • Hypertension    • Psychiatric disorder        No past surgical history on file.    Family History   Problem Relation Age of Onset   • Diabetes Mother    • Diabetes Brother          Medications have been verified.        Objective   /84   Pulse 100   Temp 98.6 °F (37 °C)   Resp 18   Ht 5' 9\" (1.753 m)   Wt 115 kg (254 lb)   SpO2 99%   BMI 37.51 kg/m²        Physical Exam     Physical Exam  Vitals and nursing note reviewed.   Constitutional:       Appearance: Normal appearance.   HENT:      Head: Normocephalic and atraumatic.   Cardiovascular:      Rate and Rhythm: Normal rate and regular rhythm.      Pulses: Normal pulses.      Heart sounds: Normal heart sounds.   Pulmonary:      Effort: Pulmonary effort is normal.      Breath sounds: Normal breath sounds.   Musculoskeletal:      Right wrist: Normal.      Left wrist: Normal.      Right hand: Tenderness present. No swelling, deformity or lacerations. Decreased range of motion. Normal strength. Normal sensation. Normal capillary refill. Normal pulse.      Left hand: Normal.      Comments: Patient describes some mild tenderness to palpation to the dorsal aspect of the " lateral right hand; mildly limited ROM due to subjective pain; no erythema, edema or ecchymosis appreciated   Skin:     General: Skin is warm and dry.      Findings: No bruising or erythema.   Neurological:      General: No focal deficit present.      Mental Status: She is alert.   Psychiatric:         Mood and Affect: Mood normal.         Behavior: Behavior normal.

## 2025-03-16 NOTE — PATIENT INSTRUCTIONS
May take OTC Tylenol or Ibuprofen as needed for pain.   May apply OTC Lidoderm, Biofreeze, IcyHot, etc. as needed for pain.      RICE is an acronym for a first aid treatment method that involves rest, ice, compression, and elevation.  It is commonly used in the treatment of sprains, strains, contusions, dislocations, or uncomplicated fractures.  It can help to relieve pain and swelling, promote flexibility, and speed up healing.     Rest - avoid using the injured area and stop the activity that caused the injury  Ice - apply an ice pack or cold gel pack wrapped in a towel to the injured area for 20 minutes every 4 hours   Compression - wrap the injured area with an elastic bandage (ACE) to reduce swelling and provide support  Elevation - keep the injured area raised above the level of the heart to drain fluid and reduce swelling      We have performed some tests on you during your visit with us. Our office will contact you with these results only if changes need to be made to the care plan previously discussed with you at your visit. You can review your results on [facility]. Please don't hesitate to call if you have any questions regarding your results and/or treatment.     If your symptoms do not improve with our current treatment plan or worsen, please schedule an appointment with your PCP. If you develop any high or persistent fevers, chest pain, palpitations, shortness of breath, difficulty swallowing, decreased urine output, abdominal pain, dizziness or any other concerning symptoms, please proceed to the ED.

## 2025-03-18 ENCOUNTER — PATIENT OUTREACH (OUTPATIENT)
Dept: CASE MANAGEMENT | Facility: OTHER | Age: 50
End: 2025-03-18

## 2025-03-18 NOTE — PROGRESS NOTES
CMOC attempted to contact Kathleen for a status update on LIHEAP.   Per CORNELIA Da Silva, application is in process.     No answer. Left message on voicemail with reason for call, this writer's contact information, and a request for a call back to discuss.     Will outreach next week if no contact prior.

## 2025-03-19 ENCOUNTER — CLINICAL SUPPORT (OUTPATIENT)
Dept: FAMILY MEDICINE CLINIC | Facility: CLINIC | Age: 50
End: 2025-03-19
Payer: COMMERCIAL

## 2025-03-19 ENCOUNTER — VBI (OUTPATIENT)
Dept: ADMINISTRATIVE | Facility: OTHER | Age: 50
End: 2025-03-19

## 2025-03-19 DIAGNOSIS — E53.8 B12 DEFICIENCY: Primary | ICD-10-CM

## 2025-03-19 PROCEDURE — 96372 THER/PROPH/DIAG INJ SC/IM: CPT | Performed by: INTERNAL MEDICINE

## 2025-03-19 NOTE — TELEPHONE ENCOUNTER
03/19/25 7:54 AM     Chart reviewed for Mammogram was/were not submitted to the patient's insurance.     Stacy Vo MA   [facility]

## 2025-03-20 ENCOUNTER — PATIENT OUTREACH (OUTPATIENT)
Dept: CASE MANAGEMENT | Facility: OTHER | Age: 50
End: 2025-03-20

## 2025-03-20 NOTE — PROGRESS NOTES
NICOLE CERVANTES received an incoming message from KIKE Torres who stated she continues to follow up with patient regarding LIHEAP. NICOLE CERVANTES thanked Vero for an update and will continue to follow and provide support as needed.

## 2025-03-25 ENCOUNTER — PATIENT OUTREACH (OUTPATIENT)
Dept: CASE MANAGEMENT | Facility: OTHER | Age: 50
End: 2025-03-25

## 2025-03-25 NOTE — PROGRESS NOTES
CMOC attempted to contact Kathleen for a status update on LIHEAP.   Per CORNELIA Da Silva, application is in process. Verification request mailed to patient's home address 3/24/25. Requested documentation is due 4/3/25.    No answer. Left message on voicemail with reason for call, this writer's contact information, and a request for a call back to discuss.     Will outreach next week if no contact prior.

## 2025-04-01 ENCOUNTER — PATIENT OUTREACH (OUTPATIENT)
Dept: CASE MANAGEMENT | Facility: OTHER | Age: 50
End: 2025-04-01

## 2025-04-01 NOTE — LETTER
04/01/25    Dear Kathleen Robles,    I am a Community Health Worker with Outpatient Care Management.     I have made several attempts to call you by phone.  If you need help with submitting the requested document for your [employer] application please contact me at [phone] so I may further assist.         Sincerely,       Vero Johnson

## 2025-04-01 NOTE — PROGRESS NOTES
CMOC attempted to contact Kathleen to discuss LIHEAP.  Per [employer], application is in process, unknown if requested packet form requesting HH member information was completed and returned.    No answer. Left message on voicemail with reason for call, this writer's contact information, and a request for a call back to assist if desired.     Unable to reach letter sent to patient's MyChart.     Referral will be closed at this time. CMOC is available if patient reaches out or for any future needs.     No further outreaches will be made.

## 2025-04-03 ENCOUNTER — PATIENT OUTREACH (OUTPATIENT)
Dept: CASE MANAGEMENT | Facility: OTHER | Age: 50
End: 2025-04-03

## 2025-04-03 NOTE — PROGRESS NOTES
NICOLE CERVANTES received incoming message from KIKE Torres who has been unable to reach patient and sent patient an unable to reach letter and closed. NICOLE Hammond sent Vero a message thanking her for an update. At this time, NICOLE CERVANTES will close as well. Please re consult NICOLE CERVANTES as needed.

## 2025-04-09 ENCOUNTER — OFFICE VISIT (OUTPATIENT)
Dept: URGENT CARE | Facility: CLINIC | Age: 50
End: 2025-04-09
Payer: COMMERCIAL

## 2025-04-09 VITALS
RESPIRATION RATE: 18 BRPM | WEIGHT: 255 LBS | DIASTOLIC BLOOD PRESSURE: 82 MMHG | SYSTOLIC BLOOD PRESSURE: 144 MMHG | BODY MASS INDEX: 37.77 KG/M2 | HEIGHT: 69 IN | TEMPERATURE: 98.7 F | HEART RATE: 100 BPM | OXYGEN SATURATION: 99 %

## 2025-04-09 DIAGNOSIS — B37.9 CANDIDIASIS: Primary | ICD-10-CM

## 2025-04-09 PROCEDURE — 99213 OFFICE O/P EST LOW 20 MIN: CPT | Performed by: EMERGENCY MEDICINE

## 2025-04-09 PROCEDURE — S9088 SERVICES PROVIDED IN URGENT: HCPCS | Performed by: EMERGENCY MEDICINE

## 2025-04-09 RX ORDER — FLUCONAZOLE 150 MG/1
150 TABLET ORAL WEEKLY
Qty: 2 TABLET | Refills: 0 | Status: SHIPPED | OUTPATIENT
Start: 2025-04-09 | End: 2025-04-17

## 2025-04-09 RX ORDER — RISPERIDONE 2 MG/1
2 TABLET ORAL
COMMUNITY
Start: 2025-03-22

## 2025-04-09 NOTE — PROGRESS NOTES
[facility]        NAME: Kathlene Robles is a 49 y.o. female  : 1975    MRN: [id number]  DATE: 2025  TIME: 7:12 PM    Assessment and Plan   Candidiasis [B37.9]  1. Candidiasis  fluconazole (DIFLUCAN) 150 mg tablet      Rash and lesions appear consistent with candidiasis.  There is no pain out of proportion to examination or extreme tenderness to palpation of the lesions noted, no crepitus noted, patient afebrile in clinic and otherwise well-appearing.  Will treat with oral fluconazole as this has helped in the past with resolution.  Reviewed prior chemistry panel which revealed normal LFTs.  Patient otherwise advised to follow-up closely with her PCP within the next 1 to 2 weeks to ensure lesions and symptoms are improving and resolving.  Patient otherwise advised to seek care in ED if symptoms significantly worsen, or she experiences fever, worsening pain, rigors, or otherwise worsening of symptoms.  All questions answered at bedside, patient was amenable to plan and voiced understanding.      Patient Instructions       Follow up with PCP in 3-5 days.  Proceed to  ER if symptoms worsen.    If tests have been performed at [facility], our office will contact you with results if changes need to be made to the care plan discussed with you at the visit.  You can review your full results on [facility].    Chief Complaint     Chief Complaint   Patient presents with    Rash     For a week has had a burning rash under both breast and groin area.          History of Present Illness       49-year-old female with history of alcohol use disorder, bipolar 1 disorder, mild intermittent asthma, schizophrenia, DM2 presents with a chief complaint of recurrent diffuse, erythematous macular rash under her breasts and in her left groin with onset of 1 week ago.  Patient states that she had a similar rash in the past which was secondary to candidiasis and resolved with oral fluconazole.  Patient otherwise  denies fever, rigors, vomiting or significant pain at the site of lesions.  States it feels very similar to her prior candidiasis skin rash.    Rash  This is a recurrent problem. The current episode started in the past 7 days. The problem is unchanged. The affected locations include the abdomen and groin. The problem is moderate. The rash is characterized by burning, itchiness and redness. She was exposed to nothing. Associated symptoms include itching. Pertinent negatives include no anorexia, congestion, cough, decreased physical activity, decreased responsiveness, decreased sleep, drinking less, diarrhea, facial edema, fatigue, fever, joint pain, rhinorrhea, shortness of breath, sore throat or vomiting. Past treatments include nothing.       Review of Systems   Review of Systems   Constitutional:  Negative for activity change, appetite change, chills, decreased responsiveness, diaphoresis, fatigue, fever and unexpected weight change.   HENT:  Negative for congestion, dental problem, drooling, ear discharge, ear pain, facial swelling, hearing loss, mouth sores, nosebleeds, postnasal drip, rhinorrhea, sinus pressure, sinus pain, sneezing, sore throat, tinnitus, trouble swallowing and voice change.    Eyes:  Negative for pain and visual disturbance.   Respiratory:  Negative for cough and shortness of breath.    Cardiovascular:  Negative for chest pain and palpitations.   Gastrointestinal:  Negative for abdominal distention, abdominal pain, anal bleeding, anorexia, blood in stool, constipation, diarrhea, nausea, rectal pain and vomiting.   Genitourinary:  Negative for dysuria and hematuria.   Musculoskeletal:  Negative for arthralgias, back pain, gait problem, joint pain, joint swelling, myalgias, neck pain and neck stiffness.   Skin:  Positive for color change, itching and rash. Negative for pallor and wound.   Neurological:  Negative for dizziness, tremors, seizures, syncope, facial asymmetry, speech difficulty,  weakness, light-headedness, numbness and headaches.   All other systems reviewed and are negative.              Current Medications       Current Outpatient Medications:     albuterol (ProAir HFA) 90 mcg/act inhaler, Inhale 2 puffs every 6 (six) hours as needed for wheezing or shortness of breath, Disp: 18 g, Rfl: 3    ergocalciferol (Drisdol) 1.25 MG (87641 UT) capsule, Take 1 capsule by mouth once a week, Disp: , Rfl:     fluconazole (DIFLUCAN) 150 mg tablet, Take 1 tablet (150 mg total) by mouth once a week for 2 doses, Disp: 2 tablet, Rfl: 0    hydrOXYzine HCL (ATARAX) 25 mg tablet, Take 25 mg by mouth every 6 (six) hours as needed, Disp: , Rfl:     loratadine (CLARITIN) 10 mg tablet, Take 1 tablet (10 mg total) by mouth daily, Disp: 90 tablet, Rfl: 1    methocarbamol (ROBAXIN) 500 mg tablet, Take 1 tablet (500 mg total) by mouth 4 (four) times a day as needed for muscle spasms, Disp: 20 tablet, Rfl: 0    metoprolol tartrate (LOPRESSOR) 50 mg tablet, Take 1 tablet (50 mg total) by mouth every 12 (twelve) hours, Disp: 180 tablet, Rfl: 1    montelukast (SINGULAIR) 10 mg tablet, Take 1 tablet (10 mg total) by mouth daily at bedtime, Disp: 90 tablet, Rfl: 1    naltrexone (REVIA) 50 mg tablet, Take 1 tablet (50 mg total) by mouth daily, Disp: 90 tablet, Rfl: 0    naproxen (Naprosyn) 500 mg tablet, Take 1 tablet (500 mg total) by mouth 2 (two) times a day with meals, Disp: 30 tablet, Rfl: 0    risperiDONE (RisperDAL) 2 mg tablet, Take 2 mg by mouth daily at bedtime, Disp: , Rfl:     sucralfate (CARAFATE) 1 g tablet, Take 1 g by mouth 4 (four) times a day as needed, Disp: , Rfl:     thiamine 100 MG tablet, Take 100 mg by mouth daily, Disp: , Rfl:     Current Facility-Administered Medications:     cyanocobalamin injection 1,000 mcg, 1,000 mcg, Intramuscular, Q30 Days, , 1,000 mcg at 02/18/25 1216    Current Allergies     Allergies as of 04/09/2025 - Reviewed 04/09/2025   Allergen Reaction Noted    Pollen extract Eye  "Swelling 01/07/2023            The following portions of the patient's history were reviewed and updated as appropriate: allergies, current medications, past family history, past medical history, past social history, past surgical history and problem list.     Past Medical History:   Diagnosis Date    Alcohol abuse 1/4/2024    Bipolar disorder (HCC)     Diabetes mellitus (HCC)     Hypertension     Psychiatric disorder        No past surgical history on file.    Family History   Problem Relation Age of Onset    Diabetes Mother     Diabetes Brother          Medications have been verified.        Objective   /82   Pulse 100   Temp 98.7 °F (37.1 °C)   Resp 18   Ht 5' 9\" (1.753 m)   Wt 116 kg (255 lb)   SpO2 99%   BMI 37.66 kg/m²   No LMP recorded. Patient is postmenopausal.       Physical Exam     Physical Exam  Vitals and nursing note reviewed.   Constitutional:       General: She is not in acute distress.     Appearance: Normal appearance. She is not ill-appearing, toxic-appearing or diaphoretic.   HENT:      Head: Normocephalic and atraumatic.      Right Ear: External ear normal.      Left Ear: External ear normal.      Nose: Nose normal.      Mouth/Throat:      Mouth: Mucous membranes are moist.      Pharynx: No oropharyngeal exudate or posterior oropharyngeal erythema.   Eyes:      General: No scleral icterus.        Right eye: No discharge.         Left eye: No discharge.      Extraocular Movements: Extraocular movements intact.      Pupils: Pupils are equal, round, and reactive to light.   Cardiovascular:      Rate and Rhythm: Normal rate and regular rhythm.      Pulses: Normal pulses.           Carotid pulses are 2+ on the right side and 2+ on the left side.       Radial pulses are 2+ on the right side and 2+ on the left side.      Heart sounds: No murmur heard.     No friction rub. No gallop.   Pulmonary:      Effort: Pulmonary effort is normal. No respiratory distress.      Breath sounds: Normal " breath sounds. No stridor. No wheezing, rhonchi or rales.   Chest:      Chest wall: No tenderness.   Abdominal:      General: Abdomen is flat. There is no distension.      Palpations: Abdomen is soft. There is no mass.      Tenderness: There is no abdominal tenderness. There is no right CVA tenderness, left CVA tenderness, guarding or rebound.      Hernia: No hernia is present.   Musculoskeletal:         General: No swelling, tenderness, deformity or signs of injury.      Cervical back: Normal range of motion and neck supple.      Right lower leg: No edema.      Left lower leg: No edema.   Skin:     General: Skin is warm and dry.      Findings: Erythema and rash present.      Comments: See attached clinical images.   Neurological:      General: No focal deficit present.      Mental Status: She is alert and oriented to person, place, and time.   Psychiatric:         Mood and Affect: Mood normal.         Behavior: Behavior normal.

## 2025-04-10 ENCOUNTER — DOCUMENTATION (OUTPATIENT)
Dept: ADMINISTRATIVE | Facility: OTHER | Age: 50
End: 2025-04-10

## 2025-04-10 VITALS — SYSTOLIC BLOOD PRESSURE: 144 MMHG | DIASTOLIC BLOOD PRESSURE: 82 MMHG

## 2025-04-10 NOTE — PROGRESS NOTES
Kane Starks DO  P Patient Reported Team  Blood pressure elevated  Appointment department: [facility]  Appointment provider: Kane Starks DO    Blood pressure  04/09/25 1730 144/82  04/09/25 1718 140/82    04/10/25 3:43 PM    Patient was called after the Urgent Care visit ; there was no answer/ a message could not be left.    Thank you.  Clari Lancaster MA  [facility]

## 2025-04-21 DIAGNOSIS — R21 RASH: ICD-10-CM

## 2025-04-21 DIAGNOSIS — J45.20 MILD INTERMITTENT ASTHMA WITHOUT COMPLICATION: ICD-10-CM

## 2025-04-22 RX ORDER — LORATADINE 10 MG/1
10 TABLET ORAL DAILY
Qty: 90 TABLET | Refills: 1 | Status: SHIPPED | OUTPATIENT
Start: 2025-04-22

## 2025-04-22 RX ORDER — MONTELUKAST SODIUM 10 MG/1
10 TABLET ORAL
Qty: 90 TABLET | Refills: 1 | Status: SHIPPED | OUTPATIENT
Start: 2025-04-22

## 2025-04-29 ENCOUNTER — APPOINTMENT (OUTPATIENT)
Dept: LAB | Facility: CLINIC | Age: 50
End: 2025-04-29
Attending: INTERNAL MEDICINE
Payer: COMMERCIAL

## 2025-04-29 ENCOUNTER — OFFICE VISIT (OUTPATIENT)
Dept: FAMILY MEDICINE CLINIC | Facility: CLINIC | Age: 50
End: 2025-04-29
Payer: COMMERCIAL

## 2025-04-29 VITALS
BODY MASS INDEX: 37.33 KG/M2 | TEMPERATURE: 97.2 F | OXYGEN SATURATION: 97 % | DIASTOLIC BLOOD PRESSURE: 82 MMHG | HEIGHT: 69 IN | RESPIRATION RATE: 18 BRPM | SYSTOLIC BLOOD PRESSURE: 136 MMHG | WEIGHT: 252 LBS | HEART RATE: 106 BPM

## 2025-04-29 DIAGNOSIS — E53.8 B12 DEFICIENCY: ICD-10-CM

## 2025-04-29 DIAGNOSIS — E66.01 SEVERE OBESITY WITH BODY MASS INDEX (BMI) OF 35.0 TO 39.9 WITH SERIOUS COMORBIDITY (HCC): ICD-10-CM

## 2025-04-29 DIAGNOSIS — F10.20 ALCOHOL USE DISORDER, MODERATE, DEPENDENCE (HCC): ICD-10-CM

## 2025-04-29 DIAGNOSIS — F10.10 ALCOHOL ABUSE: ICD-10-CM

## 2025-04-29 DIAGNOSIS — Z01.818 PRE-OP EVALUATION: ICD-10-CM

## 2025-04-29 DIAGNOSIS — F10.951: ICD-10-CM

## 2025-04-29 DIAGNOSIS — B37.89 CANDIDIASIS OF BREAST: ICD-10-CM

## 2025-04-29 DIAGNOSIS — R73.03 PRE-DIABETES: ICD-10-CM

## 2025-04-29 DIAGNOSIS — M20.42 HAMMERTOE OF LEFT FOOT: ICD-10-CM

## 2025-04-29 DIAGNOSIS — Z12.31 ENCOUNTER FOR SCREENING MAMMOGRAM FOR BREAST CANCER: ICD-10-CM

## 2025-04-29 DIAGNOSIS — M20.12 HALLUX VALGUS OF LEFT FOOT: ICD-10-CM

## 2025-04-29 DIAGNOSIS — Z00.00 ANNUAL PHYSICAL EXAM: Primary | ICD-10-CM

## 2025-04-29 LAB
ALBUMIN SERPL BCG-MCNC: 3.9 G/DL (ref 3.5–5)
ALP SERPL-CCNC: 115 U/L (ref 34–104)
ALT SERPL W P-5'-P-CCNC: 10 U/L (ref 7–52)
ANION GAP SERPL CALCULATED.3IONS-SCNC: 9 MMOL/L (ref 4–13)
AST SERPL W P-5'-P-CCNC: 17 U/L (ref 13–39)
BASOPHILS # BLD AUTO: 0.11 THOUSANDS/ÂΜL (ref 0–0.1)
BASOPHILS NFR BLD AUTO: 2 % (ref 0–1)
BILIRUB SERPL-MCNC: 0.28 MG/DL (ref 0.2–1)
BUN SERPL-MCNC: 29 MG/DL (ref 5–25)
CALCIUM SERPL-MCNC: 9.3 MG/DL (ref 8.4–10.2)
CHLORIDE SERPL-SCNC: 107 MMOL/L (ref 96–108)
CHOLEST SERPL-MCNC: 201 MG/DL (ref ?–200)
CO2 SERPL-SCNC: 22 MMOL/L (ref 21–32)
CREAT SERPL-MCNC: 1.06 MG/DL (ref 0.6–1.3)
EOSINOPHIL # BLD AUTO: 0.17 THOUSAND/ÂΜL (ref 0–0.61)
EOSINOPHIL NFR BLD AUTO: 2 % (ref 0–6)
ERYTHROCYTE [DISTWIDTH] IN BLOOD BY AUTOMATED COUNT: 17.2 % (ref 11.6–15.1)
EST. AVERAGE GLUCOSE BLD GHB EST-MCNC: 137 MG/DL
GFR SERPL CREATININE-BSD FRML MDRD: 60 ML/MIN/1.73SQ M
GLUCOSE P FAST SERPL-MCNC: 158 MG/DL (ref 65–99)
HBA1C MFR BLD: 6.4 %
HCT VFR BLD AUTO: 35.4 % (ref 34.8–46.1)
HDLC SERPL-MCNC: 58 MG/DL
HGB BLD-MCNC: 10.8 G/DL (ref 11.5–15.4)
IMM GRANULOCYTES # BLD AUTO: 0.03 THOUSAND/UL (ref 0–0.2)
IMM GRANULOCYTES NFR BLD AUTO: 0 % (ref 0–2)
LDLC SERPL CALC-MCNC: 122 MG/DL (ref 0–100)
LYMPHOCYTES # BLD AUTO: 1.33 THOUSANDS/ÂΜL (ref 0.6–4.47)
LYMPHOCYTES NFR BLD AUTO: 19 % (ref 14–44)
MCH RBC QN AUTO: 25.5 PG (ref 26.8–34.3)
MCHC RBC AUTO-ENTMCNC: 30.5 G/DL (ref 31.4–37.4)
MCV RBC AUTO: 84 FL (ref 82–98)
MONOCYTES # BLD AUTO: 0.51 THOUSAND/ÂΜL (ref 0.17–1.22)
MONOCYTES NFR BLD AUTO: 7 % (ref 4–12)
NEUTROPHILS # BLD AUTO: 4.84 THOUSANDS/ÂΜL (ref 1.85–7.62)
NEUTS SEG NFR BLD AUTO: 70 % (ref 43–75)
NRBC BLD AUTO-RTO: 0 /100 WBCS
PLATELET # BLD AUTO: 265 THOUSANDS/UL (ref 149–390)
PMV BLD AUTO: 11.4 FL (ref 8.9–12.7)
POTASSIUM SERPL-SCNC: 4.3 MMOL/L (ref 3.5–5.3)
PROT SERPL-MCNC: 6.7 G/DL (ref 6.4–8.4)
RBC # BLD AUTO: 4.23 MILLION/UL (ref 3.81–5.12)
SODIUM SERPL-SCNC: 138 MMOL/L (ref 135–147)
TRIGL SERPL-MCNC: 107 MG/DL (ref ?–150)
VIT B12 SERPL-MCNC: >7500 PG/ML (ref 180–914)
WBC # BLD AUTO: 6.99 THOUSAND/UL (ref 4.31–10.16)

## 2025-04-29 PROCEDURE — 99396 PREV VISIT EST AGE 40-64: CPT | Performed by: INTERNAL MEDICINE

## 2025-04-29 PROCEDURE — 85025 COMPLETE CBC W/AUTO DIFF WBC: CPT

## 2025-04-29 PROCEDURE — 99214 OFFICE O/P EST MOD 30 MIN: CPT | Performed by: INTERNAL MEDICINE

## 2025-04-29 PROCEDURE — 83036 HEMOGLOBIN GLYCOSYLATED A1C: CPT

## 2025-04-29 PROCEDURE — 93000 ELECTROCARDIOGRAM COMPLETE: CPT | Performed by: INTERNAL MEDICINE

## 2025-04-29 PROCEDURE — 82607 VITAMIN B-12: CPT

## 2025-04-29 PROCEDURE — 36415 COLL VENOUS BLD VENIPUNCTURE: CPT

## 2025-04-29 PROCEDURE — 80061 LIPID PANEL: CPT

## 2025-04-29 PROCEDURE — 80053 COMPREHEN METABOLIC PANEL: CPT

## 2025-04-29 RX ORDER — NYSTATIN 100000 U/G
CREAM TOPICAL 2 TIMES DAILY
Qty: 60 G | Refills: 3 | Status: SHIPPED | OUTPATIENT
Start: 2025-04-29

## 2025-04-29 RX ORDER — FLUCONAZOLE 150 MG/1
150 TABLET ORAL DAILY
Qty: 3 TABLET | Refills: 0 | Status: SHIPPED | OUTPATIENT
Start: 2025-04-29 | End: 2025-05-02

## 2025-04-29 RX ADMIN — CYANOCOBALAMIN 1000 MCG: 1000 INJECTION, SOLUTION INTRAMUSCULAR; SUBCUTANEOUS at 14:50

## 2025-04-29 NOTE — PROGRESS NOTES
Adult Annual Physical  Name: Kathleen Robles      : 1975      MRN: [id number]  Encounter Provider: Sharonda Cochran MD  Encounter Date: 2025   Encounter department: [facility]    :  Assessment & Plan  Annual physical exam         Encounter for screening mammogram for breast cancer    Orders:  •  Mammo screening bilateral w 3d and cad; Future    Alcohol abuse  -sober almost 3 months, in early remission, encouraged continued participation in peer support programs and naltrexone use        Pre-diabetes  -last A1c 5.4.        Severe obesity with body mass index (BMI) of 35.0 to 39.9 with serious comorbidity (HCC)         Candidiasis of breast    Orders:  •  nystatin (MYCOSTATIN) cream; Apply topically 2 (two) times a day  •  fluconazole (DIFLUCAN) 150 mg tablet; Take 1 tablet (150 mg total) by mouth daily for 3 doses    Pre-op evaluation  - pt will get labs done today, as ordered by her surgeon. Low-risk for complications, ECG showing normal sinus rhythm.     Orders:  •  POCT ECG    Annual physical exam         Encounter for screening mammogram for breast cancer         Alcohol abuse         Pre-diabetes         Severe obesity with body mass index (BMI) of 35.0 to 39.9 with serious comorbidity (HCC)         Candidiasis of breast         Pre-op evaluation           Annual physical exam         Encounter for screening mammogram for breast cancer               Preventive Screenings:  - Diabetes Screening: screening up-to-date  - Cholesterol Screening: screening up-to-date   - Hepatitis C screening: screening not indicated and has history of Hepatitis C   - Breast cancer screening: risks/benefits discussed and orders placed   - Colon cancer screening: risks/benefits discussed   - Lung cancer screening: screening not indicated     Immunizations:  - Immunizations due: Influenza and Zoster (Shingrix)    Counseling/Anticipatory Guidance:  - Alcohol: discussed moderation in alcohol intake  and recommendations for healthy alcohol use.   - Diet: discussed recommendations for a healthy/well-balanced diet.   - Exercise: the importance of regular exercise/physical activity was discussed. Recommend exercise 3-5 times per week for at least 30 minutes.          History of Present Illness     Adult Annual Physical:  Patient presents for annual physical. 48yo female with history of pre-diabetes, bipolar disorder, alcohol abuse here for annual physical.     Pt has been sober for 8-9 weeks, attends recovery [employer] meetings weekly and taking Revia.     Pt will be having bunion surgery next week. Did not get any testing done prior. .     Diet and Physical Activity:  - Diet/Nutrition: no special diet.  - Exercise: walking and no formal exercise.    Depression Screening:  - PHQ-2 Score: 0    General Health:  - Sleep: sleeps well.    /GYN Health:    - Menopause: postmenopausal.     Review of Systems   Constitutional:  Negative for appetite change, chills, fatigue and fever.   Respiratory:  Negative for chest tightness and shortness of breath.    Cardiovascular:  Negative for chest pain.   Gastrointestinal:  Negative for abdominal pain, constipation, diarrhea, nausea and vomiting.   Skin:  Positive for rash.   Neurological:  Negative for dizziness, light-headedness and headaches.   Psychiatric/Behavioral:  Negative for dysphoric mood, hallucinations and sleep disturbance. The patient is not nervous/anxious.      Current Outpatient Medications on File Prior to Visit   Medication Sig Dispense Refill   • albuterol (ProAir HFA) 90 mcg/act inhaler Inhale 2 puffs every 6 (six) hours as needed for wheezing or shortness of breath 18 g 3   • ergocalciferol (Drisdol) 1.25 MG (99963 UT) capsule Take 1 capsule by mouth once a week     • FT All Day Allergy Relief 10 MG tablet take 1 tablet by mouth once daily 90 tablet 1   • hydrOXYzine HCL (ATARAX) 25 mg tablet Take 25 mg by mouth every 6 (six) hours as needed     • metoprolol  "tartrate (LOPRESSOR) 50 mg tablet Take 1 tablet (50 mg total) by mouth every 12 (twelve) hours 180 tablet 1   • montelukast (SINGULAIR) 10 mg tablet take 1 tablet by mouth at bedtime 90 tablet 1   • naltrexone (REVIA) 50 mg tablet Take 1 tablet (50 mg total) by mouth daily 90 tablet 0   • risperiDONE (RisperDAL) 2 mg tablet Take 2 mg by mouth daily at bedtime     • sucralfate (CARAFATE) 1 g tablet Take 1 g by mouth 4 (four) times a day as needed     • thiamine 100 MG tablet Take 100 mg by mouth daily     • [DISCONTINUED] methocarbamol (ROBAXIN) 500 mg tablet Take 1 tablet (500 mg total) by mouth 4 (four) times a day as needed for muscle spasms 20 tablet 0   • [DISCONTINUED] naproxen (Naprosyn) 500 mg tablet Take 1 tablet (500 mg total) by mouth 2 (two) times a day with meals 30 tablet 0     Current Facility-Administered Medications on File Prior to Visit   Medication Dose Route Frequency Provider Last Rate Last Admin   • cyanocobalamin injection 1,000 mcg  1,000 mcg Intramuscular Q30 Days    1,000 mcg at 25 1450      Social History     Tobacco Use   • Smoking status: Former     Current packs/day: 0.00     Average packs/day: 0.5 packs/day for 30.0 years (15.0 ttl pk-yrs)     Types: Cigarettes     Start date: 1994     Quit date: 2024     Years since quittin.4   • Smokeless tobacco: Not on file   Vaping Use   • Vaping status: Never Used   Substance and Sexual Activity   • Alcohol use: Not Currently     Comment: sober since 2025   • Drug use: Not Currently   • Sexual activity: Not Currently       Objective   /82   Pulse (!) 106   Temp (!) 97.2 °F (36.2 °C) (Temporal)   Resp 18   Ht 5' 9\" (1.753 m)   Wt 114 kg (252 lb)   SpO2 97%   BMI 37.21 kg/m²     Physical Exam  Vitals reviewed.   Constitutional:       General: She is not in acute distress.     Appearance: She is obese.   HENT:      Head: Normocephalic and atraumatic.   Cardiovascular:      Rate and Rhythm: Normal rate and " regular rhythm.      Heart sounds: No murmur heard.     No friction rub. No gallop.   Pulmonary:      Effort: Pulmonary effort is normal. No respiratory distress.      Breath sounds: No stridor. No wheezing, rhonchi or rales.   Abdominal:      General: Abdomen is flat. Bowel sounds are normal. There is no distension.      Palpations: Abdomen is soft. There is no mass.      Tenderness: There is no abdominal tenderness. There is no guarding or rebound.   Skin:     Findings: Rash present.      Comments: Erythematous patches inferior to left breast with maceration    Neurological:      General: No focal deficit present.      Mental Status: She is alert.      Motor: Motor function is intact. No tremor.      Gait: Gait is intact.   Psychiatric:         Mood and Affect: Mood and affect normal.         Behavior: Behavior normal. Behavior is cooperative.

## 2025-04-29 NOTE — ASSESSMENT & PLAN NOTE
-sober almost 3 months, in early remission, encouraged continued participation in peer support programs and naltrexone use

## 2025-04-29 NOTE — PATIENT INSTRUCTIONS
"Patient Education     Routine physical for adults   The Basics   Written by the doctors and editors at [facility]   What is a physical? -- A physical is a routine visit, or \"check-up,\" with your doctor. You might also hear it called a \"wellness visit\" or \"preventive visit.\"  During each visit, the doctor will:   Ask about your physical and mental health   Ask about your habits, behaviors, and lifestyle   Do an exam   Give you vaccines if needed   Talk to you about any medicines you take   Give advice about your health   Answer your questions  Getting regular check-ups is an important part of taking care of your health. It can help your doctor find and treat any problems you have. But it's also important for preventing health problems.  A routine physical is different from a \"sick visit.\" A sick visit is when you see a doctor because of a health concern or problem. Since physicals are scheduled ahead of time, you can think about what you want to ask the doctor.  How often should I get a physical? -- It depends on your age and health. In general, for people age 21 years and older:   If you are younger than 50 years, you might be able to get a physical every 3 years.   If you are 50 years or older, your doctor might recommend a physical every year.  If you have an ongoing health condition, like diabetes or high blood pressure, your doctor will probably want to see you more often.  What happens during a physical? -- In general, each visit will include:   Physical exam - The doctor or nurse will check your height, weight, heart rate, and blood pressure. They will also look at your eyes and ears. They will ask about how you are feeling and whether you have any symptoms that bother you.   Medicines - It's a good idea to bring a list of all the medicines you take to each doctor visit. Your doctor will talk to you about your medicines and answer any questions. Tell them if you are having any side effects that bother you. You " "should also tell them if you are having trouble paying for any of your medicines.   Habits and behaviors - This includes:   Your diet   Your exercise habits   Whether you smoke, drink alcohol, or use drugs   Whether you are sexually active   Whether you feel safe at home  Your doctor will talk to you about things you can do to improve your health and lower your risk of health problems. They will also offer help and support. For example, if you want to quit smoking, they can give you advice and might prescribe medicines. If you want to improve your diet or get more physical activity, they can help you with this, too.   Lab tests, if needed - The tests you get will depend on your age and situation. For example, your doctor might want to check your:   Cholesterol   Blood sugar   Iron level   Vaccines - The recommended vaccines will depend on your age, health, and what vaccines you already had. Vaccines are very important because they can prevent certain serious or deadly infections.   Discussion of screening - \"Screening\" means checking for diseases or other health problems before they cause symptoms. Your doctor can recommend screening based on your age, risk, and preferences. This might include tests to check for:   Cancer, such as breast, prostate, cervical, ovarian, colorectal, prostate, lung, or skin cancer   Sexually transmitted infections, such as chlamydia and gonorrhea   Mental health conditions like depression and anxiety  Your doctor will talk to you about the different types of screening tests. They can help you decide which screenings to have. They can also explain what the results might mean.   Answering questions - The physical is a good time to ask the doctor or nurse questions about your health. If needed, they can refer you to other doctors or specialists, too.  Adults older than 65 years often need other care, too. As you get older, your doctor will talk to you about:   How to prevent falling at " home   Hearing or vision tests   Memory testing   How to take your medicines safely   Making sure that you have the help and support you need at home  All topics are updated as new evidence becomes available and our peer review process is complete.  This topic retrieved from [employer] on: May 02, 2024.  Topic 650105 Version 1.0  Release: 32.4.3 - C32.122  © 2024 UpToDate, Inc. and/or its affiliates. All rights reserved.  Consumer Information Use and Disclaimer   Disclaimer: This generalized information is a limited summary of diagnosis, treatment, and/or medication information. It is not meant to be comprehensive and should be used as a tool to help the user understand and/or assess potential diagnostic and treatment options. It does NOT include all information about conditions, treatments, medications, side effects, or risks that may apply to a specific patient. It is not intended to be medical advice or a substitute for the medical advice, diagnosis, or treatment of a health care provider based on the health care provider's examination and assessment of a patient's specific and unique circumstances. Patients must speak with a health care provider for complete information about their health, medical questions, and treatment options, including any risks or benefits regarding use of medications. This information does not endorse any treatments or medications as safe, effective, or approved for treating a specific patient. UpToDate, Inc. and its affiliates disclaim any warranty or liability relating to this information or the use thereof.The use of this information is governed by the Terms of Use, available at https://www.[employer] Solutionsuwer.com/en/know/clinical-effectiveness-terms. 2024© UpToDate, Inc. and its affiliates and/or licensors. All rights reserved.  Copyright   © 2024 UpToDate, Inc. and/or its affiliates. All rights reserved.

## 2025-05-01 ENCOUNTER — RESULTS FOLLOW-UP (OUTPATIENT)
Dept: FAMILY MEDICINE CLINIC | Facility: CLINIC | Age: 50
End: 2025-05-01

## 2025-05-01 ENCOUNTER — TELEPHONE (OUTPATIENT)
Age: 50
End: 2025-05-01

## 2025-05-01 NOTE — TELEPHONE ENCOUNTER
PT ECG and bloodwork recently done. Please advise and addend note if able to. Will fax afterward. Thank you!

## 2025-05-01 NOTE — TELEPHONE ENCOUNTER
Dr Valdivia office called in requesting patients surgery clearance and EKG forms be faxed to them from patients visit on 4/29. Patient is having surgery on Tuesday.     Fax: [phone]

## 2025-05-05 NOTE — TELEPHONE ENCOUNTER
Leyla with [facility] states they still have not received clearance forms and EKG. Confirmed fax number from fax confirmation and it was incorrect. Correct fax number is [phone]. She requests they be re-faxed as soon as possible.

## 2025-05-27 ENCOUNTER — DOCUMENTATION (OUTPATIENT)
Dept: ADMINISTRATIVE | Facility: OTHER | Age: 50
End: 2025-05-27

## 2025-05-27 ENCOUNTER — OFFICE VISIT (OUTPATIENT)
Dept: URGENT CARE | Facility: CLINIC | Age: 50
End: 2025-05-27
Payer: COMMERCIAL

## 2025-05-27 VITALS
BODY MASS INDEX: 40.18 KG/M2 | HEART RATE: 100 BPM | WEIGHT: 272.1 LBS | TEMPERATURE: 98.2 F | RESPIRATION RATE: 16 BRPM | SYSTOLIC BLOOD PRESSURE: 140 MMHG | OXYGEN SATURATION: 99 % | DIASTOLIC BLOOD PRESSURE: 80 MMHG

## 2025-05-27 DIAGNOSIS — N61.0 CELLULITIS OF RIGHT BREAST: Primary | ICD-10-CM

## 2025-05-27 DIAGNOSIS — B37.89 CANDIDIASIS OF BREAST: ICD-10-CM

## 2025-05-27 PROCEDURE — S9088 SERVICES PROVIDED IN URGENT: HCPCS

## 2025-05-27 PROCEDURE — 99213 OFFICE O/P EST LOW 20 MIN: CPT

## 2025-05-27 RX ORDER — CEPHALEXIN 500 MG/1
500 CAPSULE ORAL EVERY 6 HOURS SCHEDULED
Qty: 28 CAPSULE | Refills: 0 | Status: SHIPPED | OUTPATIENT
Start: 2025-05-27 | End: 2025-06-03

## 2025-05-27 RX ORDER — NYSTATIN 100000 [USP'U]/G
POWDER TOPICAL 2 TIMES DAILY
Qty: 60 G | Refills: 0 | Status: SHIPPED | OUTPATIENT
Start: 2025-05-27

## 2025-05-27 NOTE — PROGRESS NOTES
[facility]        NAME: Kathleen Robles is a 49 y.o. female  : 1975    MRN: [id number]  DATE: May 27, 2025  TIME: 9:54 AM    Assessment and Plan   Cellulitis of right breast [N61.0]  1. Cellulitis of right breast  cephalexin (KEFLEX) 500 mg capsule      2. Candidiasis of breast  nystatin (MYCOSTATIN) powder        Discussed with patient symptoms appear to be candidal infection and the start of an early cellulitis.  Will treat with Keflex.  Also will refill patient's nystatin powder.  Educated patient on local wound care and to monitor for signs of infection. Instructed patient to follow-up with PCP for no improvement or worsening of symptoms.  Patient educated on red flag symptoms and when to proceed to the ED.  Patient agreeable and understands current treatment plan.     Patient Instructions     Patient Instructions   Please take Keflex every 6 hours for the next 7 days.  I also refilled your nystatin powder.  You may apply this 2 times daily to the affected area.    Please wash affected area daily with soap and water. May apply OTC neosporin or other antibiotic ointment twice daily.  Keep the area dry.     If you notice any signs of infection including fever, swelling, redness, pain, and/or purulent drainage please follow up immediately with your PCP or proceed to the ED.      Antibiotics are medicines that treat bacterial infections by either killing bacteria or preventing them from growing. It is important to take the full course of your antibiotics as prescribed to kill the bacteria causing your infection. Stopping your antibiotics early could lead to reinfection and can also promote the spread of antibiotic resistant bacteria. Some antibiotics may cause certain side effects including: nausea, vomiting, diarrhea, bloating, indigestion, belly pain, and/or loss of appetite. Eating yogurt with live and active cultures and/or taking a probiotic and maintaining a healthy diet can help to reduce  some of these side effects.     If your symptoms do not improve with our current treatment plan or worsen, please schedule an appointment with your PCP. If you develop any high or persistent fevers, chest pain, palpitations, shortness of breath, difficulty swallowing, decreased urine output, abdominal pain, dizziness or any other concerning symptoms, please proceed to the ED.       Follow up with PCP in 3-5 days.  Proceed to  ER if symptoms worsen.    Chief Complaint     Chief Complaint   Patient presents with   • Rash     Rash under right side breast ongoing problem         History of Present Illness       49-year-old female presents to the clinic for evaluation of right breast rash x 4 days.  Patient reports the rash is worsening.  She reports it is red and itchy.  She reports she has a chronic history of candidal infections.  She has been given nystatin powder and cream before in the past.  She reports she has been applying this with mild relief of symptoms.  She denies any other symptoms at this time.           Rash  Pertinent negatives include no cough, diarrhea, fever, shortness of breath or vomiting.       Review of Systems   Review of Systems   Constitutional:  Negative for chills and fever.   Respiratory:  Negative for cough and shortness of breath.    Cardiovascular:  Negative for chest pain and palpitations.   Gastrointestinal:  Negative for diarrhea, nausea and vomiting.   Genitourinary:  Negative for decreased urine volume.   Musculoskeletal:  Negative for arthralgias and myalgias.   Skin:  Positive for color change (redness) and rash.   Neurological:  Negative for dizziness and headaches.   All other systems reviewed and are negative.        Current Medications     Current Medications[1]    Current Allergies     Allergies as of 05/27/2025 - Reviewed 05/27/2025   Allergen Reaction Noted   • Bee pollen Swelling 01/07/2023   • Pollen extract Eye Swelling 01/07/2023            The following portions of the  patient's history were reviewed and updated as appropriate: allergies, current medications, past family history, past medical history, past social history, past surgical history and problem list.     Past Medical History[2]    Past Surgical History[3]    Family History[4]      Medications have been verified.        Objective   /80   Pulse 100   Temp 98.2 °F (36.8 °C)   Resp 16   Wt 123 kg (272 lb 1.6 oz)   SpO2 99%   BMI 40.18 kg/m²        Physical Exam     Physical Exam  Vitals and nursing note reviewed.   Constitutional:       Appearance: Normal appearance.   HENT:      Head: Normocephalic and atraumatic.     Cardiovascular:      Rate and Rhythm: Normal rate and regular rhythm.      Heart sounds: Normal heart sounds.   Pulmonary:      Effort: Pulmonary effort is normal.      Breath sounds: Normal breath sounds.     Skin:     General: Skin is warm and dry.      Findings: Erythema present. No ecchymosis.           Comments: Erythema noted to the patient's skin under her right breast; some small areas are open and oozing serous fluid     Neurological:      General: No focal deficit present.      Mental Status: She is alert.     Psychiatric:         Mood and Affect: Mood normal.         Behavior: Behavior normal.                          [1]    Current Outpatient Medications:   •  albuterol (ProAir HFA) 90 mcg/act inhaler, Inhale 2 puffs every 6 (six) hours as needed for wheezing or shortness of breath, Disp: 18 g, Rfl: 3  •  cephalexin (KEFLEX) 500 mg capsule, Take 1 capsule (500 mg total) by mouth every 6 (six) hours for 7 days, Disp: 28 capsule, Rfl: 0  •  FT All Day Allergy Relief 10 MG tablet, take 1 tablet by mouth once daily, Disp: 90 tablet, Rfl: 1  •  hydrOXYzine HCL (ATARAX) 25 mg tablet, Take 25 mg by mouth every 6 (six) hours as needed, Disp: , Rfl:   •  metoprolol tartrate (LOPRESSOR) 50 mg tablet, Take 1 tablet (50 mg total) by mouth every 12 (twelve) hours, Disp: 180 tablet, Rfl: 1  •   montelukast (SINGULAIR) 10 mg tablet, take 1 tablet by mouth at bedtime, Disp: 90 tablet, Rfl: 1  •  naltrexone (REVIA) 50 mg tablet, Take 1 tablet (50 mg total) by mouth daily, Disp: 90 tablet, Rfl: 0  •  nystatin (MYCOSTATIN) cream, Apply topically 2 (two) times a day, Disp: 60 g, Rfl: 3  •  nystatin (MYCOSTATIN) powder, Apply topically 2 (two) times a day, Disp: 60 g, Rfl: 0  •  risperiDONE (RisperDAL) 2 mg tablet, Take 2 mg by mouth daily at bedtime, Disp: , Rfl:   •  sucralfate (CARAFATE) 1 g tablet, Take 1 g by mouth as needed in the morning and 1 g as needed at noon and 1 g as needed in the evening and 1 g as needed before bedtime., Disp: , Rfl:   •  thiamine 100 MG tablet, Take 100 mg by mouth in the morning., Disp: , Rfl:   •  ergocalciferol (Drisdol) 1.25 MG (40503 UT) capsule, Take 1 capsule by mouth once a week, Disp: , Rfl:     Current Facility-Administered Medications:   •  cyanocobalamin injection 1,000 mcg, 1,000 mcg, Intramuscular, Q30 Days, , 1,000 mcg at 04/29/25 1450[2]  Past Medical History:  Diagnosis Date   • Alcohol abuse 1/4/2024   • Bipolar disorder (HCC)    • Diabetes mellitus (HCC)    • Hypertension    • Psychiatric disorder    [3]  No past surgical history on file.[4]  Family History  Problem Relation Name Age of Onset   • Diabetes Mother     • Diabetes Brother

## 2025-05-27 NOTE — PATIENT INSTRUCTIONS
Please take Keflex every 6 hours for the next 7 days.  I also refilled your nystatin powder.  You may apply this 2 times daily to the affected area.    Please wash affected area daily with soap and water. May apply OTC neosporin or other antibiotic ointment twice daily.  Keep the area dry.     If you notice any signs of infection including fever, swelling, redness, pain, and/or purulent drainage please follow up immediately with your PCP or proceed to the ED.      Antibiotics are medicines that treat bacterial infections by either killing bacteria or preventing them from growing. It is important to take the full course of your antibiotics as prescribed to kill the bacteria causing your infection. Stopping your antibiotics early could lead to reinfection and can also promote the spread of antibiotic resistant bacteria. Some antibiotics may cause certain side effects including: nausea, vomiting, diarrhea, bloating, indigestion, belly pain, and/or loss of appetite. Eating yogurt with live and active cultures and/or taking a probiotic and maintaining a healthy diet can help to reduce some of these side effects.     If your symptoms do not improve with our current treatment plan or worsen, please schedule an appointment with your PCP. If you develop any high or persistent fevers, chest pain, palpitations, shortness of breath, difficulty swallowing, decreased urine output, abdominal pain, dizziness or any other concerning symptoms, please proceed to the ED.

## 2025-05-27 NOTE — PROGRESS NOTES
05/28/25 2:46 PM    Patient was called after the Urgent Care visit ; a message was left for the patient to return the call    Thank you.  Ford Aden MA  [facility]          CASSIA Esteban  P Patient Reported Team         Blood pressure elevated  Appointment department: [facility]  Appointment provider: CASSIA Esteban  Blood pressure  05/27/25 0949 140/80  05/27/25 0920 144/84

## 2025-05-28 VITALS — SYSTOLIC BLOOD PRESSURE: 140 MMHG | DIASTOLIC BLOOD PRESSURE: 80 MMHG

## 2025-06-04 ENCOUNTER — OFFICE VISIT (OUTPATIENT)
Dept: URGENT CARE | Facility: CLINIC | Age: 50
End: 2025-06-04
Payer: COMMERCIAL

## 2025-06-04 VITALS
SYSTOLIC BLOOD PRESSURE: 128 MMHG | TEMPERATURE: 97 F | RESPIRATION RATE: 16 BRPM | HEART RATE: 100 BPM | WEIGHT: 272 LBS | DIASTOLIC BLOOD PRESSURE: 82 MMHG | HEIGHT: 69 IN | BODY MASS INDEX: 40.29 KG/M2 | OXYGEN SATURATION: 99 %

## 2025-06-04 DIAGNOSIS — T14.8XXA WOUND INFECTION: Primary | ICD-10-CM

## 2025-06-04 DIAGNOSIS — L08.9 WOUND INFECTION: Primary | ICD-10-CM

## 2025-06-04 PROCEDURE — S9088 SERVICES PROVIDED IN URGENT: HCPCS | Performed by: PHYSICIAN ASSISTANT

## 2025-06-04 PROCEDURE — 99213 OFFICE O/P EST LOW 20 MIN: CPT | Performed by: PHYSICIAN ASSISTANT

## 2025-06-04 NOTE — PROGRESS NOTES
"[facility]        NAME: Kathleen Robles is a 49 y.o. female  : 1975    MRN: [id number]  DATE: 2025  TIME: 9:58 AM    Assessment and Plan   Wound infection [T14.8XXA, L08.9]  1. Wound infection  Transfer to other facility          Patient to ED for further evaluation and management.       Chief Complaint     Chief Complaint   Patient presents with    Foot Pain     Patient c/o left foot pain/swelling that started roughly a week ago.         History of Present Illness       48yo F presents c/o pain, swelling, and foul odor from left foot s/p bunion surgery on 25.  Patient reports she saw surgeon for removal of sutures last week on 25.  Patient on keflex for 1 week ending yesterday for cellulitis of breast.  She reports foot symptoms have been persisting despite this treatment.  She has trouble with wound care due to housing insecurity.  She denies fever, chills, fatigue.        Review of Systems   Review of Systems   Constitutional:  Negative for fatigue and fever.   Respiratory:  Negative for shortness of breath.    Cardiovascular:  Negative for chest pain.         Current Medications     Current Medications[1]    Current Allergies     Allergies as of 2025 - Reviewed 2025   Allergen Reaction Noted    Bee pollen Swelling 2023    Pollen extract Eye Swelling 2023            The following portions of the patient's history were reviewed and updated as appropriate: allergies, current medications, past family history, past medical history, past social history, past surgical history and problem list.     Past Medical History[2]    Past Surgical History[3]    Family History[4]      Medications have been verified.        Objective   /82   Pulse 100   Temp (!) 97 °F (36.1 °C) (Temporal)   Resp 16   Ht 5' 9\" (1.753 m)   Wt 123 kg (272 lb)   SpO2 99%   BMI 40.17 kg/m²   No LMP recorded. Patient is postmenopausal.       Physical Exam     Physical " Exam  Constitutional:       Appearance: She is well-developed.   HENT:      Head: Normocephalic.      Right Ear: Hearing normal.      Left Ear: Hearing normal.      Nose: No mucosal edema.      Mouth/Throat:      Tonsils: No tonsillar exudate.     Cardiovascular:      Rate and Rhythm: Normal rate and regular rhythm.   Pulmonary:      Effort: Pulmonary effort is normal. No respiratory distress.      Breath sounds: No decreased breath sounds or wheezing.   Abdominal:      General: Bowel sounds are normal.   Lymphadenopathy:      Cervical: No cervical adenopathy.      Right cervical: No superficial cervical adenopathy.     Left cervical: No superficial cervical adenopathy.     Skin:     Comments: Erythema, edema, purulent discharge surrounding surgical wound to 2nd toe; foul smell from wound     Neurological:      Mental Status: She is alert.                        [1]   Current Outpatient Medications:     albuterol (ProAir HFA) 90 mcg/act inhaler, Inhale 2 puffs every 6 (six) hours as needed for wheezing or shortness of breath, Disp: 18 g, Rfl: 3    FT All Day Allergy Relief 10 MG tablet, take 1 tablet by mouth once daily, Disp: 90 tablet, Rfl: 1    hydrOXYzine HCL (ATARAX) 25 mg tablet, Take 25 mg by mouth every 6 (six) hours as needed, Disp: , Rfl:     metoprolol tartrate (LOPRESSOR) 50 mg tablet, Take 1 tablet (50 mg total) by mouth every 12 (twelve) hours, Disp: 180 tablet, Rfl: 1    montelukast (SINGULAIR) 10 mg tablet, take 1 tablet by mouth at bedtime, Disp: 90 tablet, Rfl: 1    naltrexone (REVIA) 50 mg tablet, Take 1 tablet (50 mg total) by mouth daily, Disp: 90 tablet, Rfl: 0    nystatin (MYCOSTATIN) cream, Apply topically 2 (two) times a day, Disp: 60 g, Rfl: 3    nystatin (MYCOSTATIN) powder, Apply topically 2 (two) times a day, Disp: 60 g, Rfl: 0    risperiDONE (RisperDAL) 2 mg tablet, Take 2 mg by mouth daily at bedtime, Disp: , Rfl:     sucralfate (CARAFATE) 1 g tablet, Take 1 g by mouth as needed in the  morning and 1 g as needed at noon and 1 g as needed in the evening and 1 g as needed before bedtime., Disp: , Rfl:     thiamine 100 MG tablet, Take 100 mg by mouth in the morning., Disp: , Rfl:     ergocalciferol (Drisdol) 1.25 MG (48980 UT) capsule, Take 1 capsule by mouth once a week, Disp: , Rfl:     Current Facility-Administered Medications:     cyanocobalamin injection 1,000 mcg, 1,000 mcg, Intramuscular, Q30 Days, , 1,000 mcg at 04/29/25 1450  [2]   Past Medical History:  Diagnosis Date    Alcohol abuse 1/4/2024    Bipolar disorder (HCC)     Diabetes mellitus (HCC)     Hypertension     Psychiatric disorder    [3] No past surgical history on file.  [4]   Family History  Problem Relation Name Age of Onset    Diabetes Mother      Diabetes Brother

## 2025-06-20 ENCOUNTER — OFFICE VISIT (OUTPATIENT)
Dept: URGENT CARE | Facility: CLINIC | Age: 50
End: 2025-06-20
Payer: COMMERCIAL

## 2025-06-20 VITALS
DIASTOLIC BLOOD PRESSURE: 80 MMHG | OXYGEN SATURATION: 97 % | HEART RATE: 105 BPM | SYSTOLIC BLOOD PRESSURE: 117 MMHG | RESPIRATION RATE: 18 BRPM

## 2025-06-20 DIAGNOSIS — B37.2 SKIN YEAST INFECTION: ICD-10-CM

## 2025-06-20 DIAGNOSIS — N61.0 CELLULITIS OF RIGHT BREAST: ICD-10-CM

## 2025-06-20 DIAGNOSIS — N61.0 CELLULITIS OF LEFT BREAST: Primary | ICD-10-CM

## 2025-06-20 DIAGNOSIS — L03.314 CELLULITIS OF LEFT GROIN: ICD-10-CM

## 2025-06-20 PROCEDURE — S9088 SERVICES PROVIDED IN URGENT: HCPCS | Performed by: NURSE PRACTITIONER

## 2025-06-20 PROCEDURE — 99214 OFFICE O/P EST MOD 30 MIN: CPT | Performed by: NURSE PRACTITIONER

## 2025-06-20 RX ORDER — CEPHALEXIN 500 MG/1
1000 CAPSULE ORAL EVERY 12 HOURS SCHEDULED
Qty: 40 CAPSULE | Refills: 0 | Status: SHIPPED | OUTPATIENT
Start: 2025-06-20 | End: 2025-06-30

## 2025-06-20 RX ORDER — CEFADROXIL 1000 MG/1
1 TABLET ORAL
Qty: 10 TABLET | Refills: 0 | Status: SHIPPED | OUTPATIENT
Start: 2025-06-20 | End: 2025-06-20

## 2025-06-20 NOTE — PATIENT INSTRUCTIONS
I think the skin infections are both bacterial (cellulitis) and fungal (yeast).    The other green antibiotic capsule (cephalexin) was ordered for 7 days starting 5/27--as discussed, this should be long done.  Taking it infrequently leads to very low levels in your body--not high enough to kill the germs.  Stop taking that and take this new one which is just 1 dose/day.  Put it somewhere you will remember to take it.    In all rash areas, I also recommend resuming the antifungal powder (miconazole) twice per day until clear for at least 3 days.  Shower every day and pat these areas dry.  Use new towels and clothes every day until the skin is completely clear for at least 3 days.

## 2025-06-20 NOTE — PROGRESS NOTES
[facility]        NAME: Kathleen Robles is a 49 y.o. female  : 1975    MRN: [id number]  DATE: 2025  TIME: 4:48 PM      Assessment and Plan     Cellulitis of left breast [N61.0]  1. Cellulitis of left breast  cephalexin (KEFLEX) 500 mg capsule    DISCONTINUED: cefadroxil (DURICEF) 1000 mg tablet      2. Skin yeast infection        3. Cellulitis of right breast  cephalexin (KEFLEX) 500 mg capsule    DISCONTINUED: cefadroxil (DURICEF) 1000 mg tablet      4. Cellulitis of left groin  cephalexin (KEFLEX) 500 mg capsule    DISCONTINUED: cefadroxil (DURICEF) 1000 mg tablet        Received message from registrar that patient called and med ordered not at pharmacy.  Called her back [phone]).  Called pharmacy ([phone])--order received but med not covered, needs prior auth.  A code showed that would allow them an emergency 5 day auth but not full 10 days.  Cash pay price ~$100 (and patient has expressed in past that the $1-3 copays can sometimes be tight).  Med switched back to keflex but with 1000 mg BID dosing rather than 500 mg QID.  Pharmacy staff confirmed order received while we were still on phone and it is covered.  Called Kathleen back  and emphasized that she will need to get both doses in.  She states understanding and notes that she was back at the pharmacy.    Patient Instructions     Patient Instructions   I think the skin infections are both bacterial (cellulitis) and fungal (yeast).    The other green antibiotic capsule (cephalexin) was ordered for 7 days starting --as discussed, this should be long done.  Taking it infrequently leads to very low levels in your body--not high enough to kill the germs.  Stop taking that and take this new one which is just 1 dose/day.  Put it somewhere you will remember to take it.    In all rash areas, I also recommend resuming the antifungal powder (miconazole) twice per day until clear for at least 3 days.  Shower every day and pat these areas dry.   Use new towels and clothes every day until the skin is completely clear for at least 3 days.    Follow up with PCP in 3-5 days.  Proceed to  ER if symptoms worsen.    Chief Complaint     Chief Complaint   Patient presents with    Rash     Patient c/o rash under breasts and abdominal folds that started a few weeks ago.           History of Present Illness     Patient presents for ongoing and worsening rash under both breasts and under left lower abdomen.  This started a few weeks ago.  She was seen here and diagnosed with cellulitis under 1 breast back on May 27, ordered 7 days of 4 times daily Keflex.  While this is 23 days ago, patient states that she still has some of the pills left and has been taking them.  States she has been having trouble remembering to take them.  She has also been putting ointment, Vaseline and similar, on the areas.    When asked about shower frequency, she states she usually showers about every other day.  When discussing how her alcohol use and sobriety are going, she hesitates and states she has been drinking some wine coolers but not beers.    Discussed the importance of showering every day with good drying of the areas and new clothing/linens very day.  Discussed that the antibiotic must reach a certain level in her body for it to work and clear up for the infection.  Discussed that only taking it occasionally here and there does not provide enough the medicine in her body to actually resolve the infection and that is very important that she remembers to take it.            Review of Systems     Review of Systems   Skin:  Positive for color change and rash.   All other systems reviewed and are negative.        Current Medications     Current Medications[1]    Current Allergies     Allergies as of 06/20/2025 - Reviewed 06/20/2025   Allergen Reaction Noted    Bee pollen Swelling 01/07/2023    Pollen extract Eye Swelling 01/07/2023              The following portions of the patient's  history were reviewed and updated as appropriate: allergies, current medications, past family history, past medical history, past social history, past surgical history and problem list.     Past Medical History[2]    Past Surgical History[3]    Family History[4]      Medications have been verified.        Objective     /80   Pulse 105   Resp 18   SpO2 97%   No LMP recorded. Patient is postmenopausal.         Physical Exam     Physical Exam  Vitals and nursing note reviewed.   Constitutional:       General: She is not in acute distress.     Appearance: She is not ill-appearing, toxic-appearing or diaphoretic.      Comments: Appears disheveled   HENT:      Head: Normocephalic and atraumatic.   Pulmonary:      Effort: Pulmonary effort is normal. No respiratory distress.   Abdominal:      General: There is no distension.      Palpations: Abdomen is soft.     Musculoskeletal:         General: Normal range of motion.      Cervical back: Normal range of motion and neck supple.     Skin:     General: Skin is warm and dry.      Capillary Refill: Capillary refill takes less than 2 seconds.      Findings: Erythema and rash present.      Comments: Bright red areas under b/l breasts and under left lower abdomen/panus with both streaking edges and satellite lesions.  Foul smell.  Greasy--appears to be coated in Vaseline.  Suspect both worsening cellulitis due to antibiotic noncompliance and fungal infection.     Neurological:      General: No focal deficit present.      Mental Status: She is alert and oriented to person, place, and time.     Psychiatric:         Mood and Affect: Mood and affect normal.         Behavior: Behavior normal. Behavior is cooperative.         Thought Content: Thought content normal.         Judgment: Judgment normal.            [1]   Current Outpatient Medications:     albuterol (ProAir HFA) 90 mcg/act inhaler, Inhale 2 puffs every 6 (six) hours as needed for wheezing or shortness of breath,  Disp: 18 g, Rfl: 3    cephalexin (KEFLEX) 500 mg capsule, Take 2 capsules (1,000 mg total) by mouth every 12 (twelve) hours for 10 days, Disp: 40 capsule, Rfl: 0    FT All Day Allergy Relief 10 MG tablet, take 1 tablet by mouth once daily, Disp: 90 tablet, Rfl: 1    hydrOXYzine HCL (ATARAX) 25 mg tablet, Take 25 mg by mouth every 6 (six) hours as needed, Disp: , Rfl:     metoprolol tartrate (LOPRESSOR) 50 mg tablet, Take 1 tablet (50 mg total) by mouth every 12 (twelve) hours, Disp: 180 tablet, Rfl: 1    montelukast (SINGULAIR) 10 mg tablet, take 1 tablet by mouth at bedtime, Disp: 90 tablet, Rfl: 1    naltrexone (REVIA) 50 mg tablet, Take 1 tablet (50 mg total) by mouth daily, Disp: 90 tablet, Rfl: 0    nystatin (MYCOSTATIN) cream, Apply topically 2 (two) times a day, Disp: 60 g, Rfl: 3    nystatin (MYCOSTATIN) powder, Apply topically 2 (two) times a day, Disp: 60 g, Rfl: 0    risperiDONE (RisperDAL) 2 mg tablet, Take 2 mg by mouth daily at bedtime, Disp: , Rfl:     sucralfate (CARAFATE) 1 g tablet, Take 1 g by mouth as needed in the morning and 1 g as needed at noon and 1 g as needed in the evening and 1 g as needed before bedtime., Disp: , Rfl:     thiamine 100 MG tablet, Take 100 mg by mouth in the morning., Disp: , Rfl:     ergocalciferol (Drisdol) 1.25 MG (93197 UT) capsule, Take 1 capsule by mouth once a week, Disp: , Rfl:     Current Facility-Administered Medications:     cyanocobalamin injection 1,000 mcg, 1,000 mcg, Intramuscular, Q30 Days, , 1,000 mcg at 04/29/25 1450  [2]   Past Medical History:  Diagnosis Date    Alcohol abuse 1/4/2024    Bipolar disorder (HCC)     Diabetes mellitus (HCC)     Hypertension     Psychiatric disorder    [3] No past surgical history on file.  [4]   Family History  Problem Relation Name Age of Onset    Diabetes Mother      Diabetes Brother

## 2025-07-12 ENCOUNTER — OFFICE VISIT (OUTPATIENT)
Dept: URGENT CARE | Facility: CLINIC | Age: 50
End: 2025-07-12
Payer: COMMERCIAL

## 2025-07-12 VITALS
RESPIRATION RATE: 16 BRPM | DIASTOLIC BLOOD PRESSURE: 74 MMHG | TEMPERATURE: 97.8 F | BODY MASS INDEX: 39.59 KG/M2 | OXYGEN SATURATION: 98 % | WEIGHT: 268.1 LBS | SYSTOLIC BLOOD PRESSURE: 116 MMHG | HEART RATE: 110 BPM

## 2025-07-12 DIAGNOSIS — J45.909 ASTHMA, UNSPECIFIED ASTHMA SEVERITY, UNSPECIFIED WHETHER COMPLICATED, UNSPECIFIED WHETHER PERSISTENT: Primary | ICD-10-CM

## 2025-07-12 DIAGNOSIS — R06.02 SHORTNESS OF BREATH: ICD-10-CM

## 2025-07-12 PROCEDURE — S9088 SERVICES PROVIDED IN URGENT: HCPCS | Performed by: NURSE PRACTITIONER

## 2025-07-12 PROCEDURE — 99213 OFFICE O/P EST LOW 20 MIN: CPT | Performed by: NURSE PRACTITIONER

## 2025-07-12 RX ORDER — ALBUTEROL SULFATE 90 UG/1
2 INHALANT RESPIRATORY (INHALATION) EVERY 6 HOURS PRN
Qty: 18 G | Refills: 0 | Status: SHIPPED | OUTPATIENT
Start: 2025-07-12

## 2025-07-12 NOTE — PATIENT INSTRUCTIONS
You have had your ventolin/albuterol refilled.  Follow up with your PCP  Go to the ED if symptoms worsen

## 2025-07-12 NOTE — PROGRESS NOTES
"[facility]  Name: Kathleen Robles      : 1975      MRN: [id number]  Encounter Provider: CASSIA Mendoza  Encounter Date: 2025   Encounter department: [facility] [place]  :  Assessment & Plan  Asthma, unspecified asthma severity, unspecified whether complicated, unspecified whether persistent    Orders:    albuterol (Ventolin HFA) 90 mcg/act inhaler; Inhale 2 puffs every 6 (six) hours as needed for wheezing or shortness of breath    Shortness of breath             Patient Instructions  Follow up with PCP in 3-5 days.  Proceed to  ER if symptoms worsen.    If tests are performed, our office will contact you with results only if changes need to made to the care plan discussed with you at the visit. [facility]      You have had your ventolin/albuterol refilled.  Follow up with your PCP  Go to the ED if symptoms worsen    Chief Complaint:   Chief Complaint   Patient presents with    Shortness of Breath     SOB, has Asthma, also had a panic attack today.     History of Present Illness   This is a 49 year old female who comes to care now with c/o shortness of breath and needs her \"blue and grey inhaler\" refilled. She denies fevers, chills, n/v/d.   PMH is listed and reviewed.       Shortness of Breath          Review of Systems   Constitutional: Negative.    HENT: Negative.     Eyes: Negative.    Respiratory:  Positive for shortness of breath.    Cardiovascular: Negative.    Gastrointestinal: Negative.    Endocrine: Negative.    Genitourinary: Negative.    Musculoskeletal: Negative.    Skin: Negative.    Allergic/Immunologic: Negative.    Neurological: Negative.    Hematological: Negative.    Psychiatric/Behavioral: Negative.       Past Medical History   Past Medical History[1]  Past Surgical History[2]  Family History[3]  she reports that she quit smoking about 8 months ago. Her smoking use included cigarettes. She started smoking " about 30 years ago. She has a 15 pack-year smoking history. She does not have any smokeless tobacco history on file. She reports that she does not currently use alcohol. She reports that she does not currently use drugs.  Current Outpatient Medications   Medication Instructions    albuterol (ProAir HFA) 90 mcg/act inhaler 2 puffs, Inhalation, Every 6 hours PRN    albuterol (Ventolin HFA) 90 mcg/act inhaler 2 puffs, Inhalation, Every 6 hours PRN    ergocalciferol (Drisdol) 1.25 MG (84029 UT) capsule 1 capsule, Weekly    FT All Day Allergy Relief 10 mg, Oral, Daily    hydrOXYzine HCL (ATARAX) 25 mg, Every 6 hours PRN    metoprolol tartrate (LOPRESSOR) 50 mg, Oral, Every 12 hours scheduled    montelukast (SINGULAIR) 10 mg, Oral, Daily at bedtime    naltrexone (REVIA) 50 mg, Oral, Daily    nystatin (MYCOSTATIN) cream Topical, 2 times daily    nystatin (MYCOSTATIN) powder Topical, 2 times daily    risperiDONE (RISPERDAL) 2 mg, Daily at bedtime    sucralfate (CARAFATE) 1 g, 4 times daily PRN    thiamine 100 mg, Daily   Allergies[4]     Objective   /74   Pulse (!) 110   Temp 97.8 °F (36.6 °C)   Resp 16   Wt 122 kg (268 lb 1.6 oz)   SpO2 98%   BMI 39.59 kg/m²      Physical Exam  Vitals and nursing note reviewed.   Constitutional:       General: She is not in acute distress.     Appearance: She is well-developed. She is obese. She is not ill-appearing, toxic-appearing or diaphoretic.      Interventions: She is not intubated.     Comments: Disheveled. Foul odor in room   Pt is eating food    HENT:      Head: Normocephalic and atraumatic.      Mouth/Throat:      Mouth: Mucous membranes are moist.     Eyes:      Extraocular Movements: Extraocular movements intact.       Cardiovascular:      Rate and Rhythm: Normal rate and regular rhythm.   Pulmonary:      Effort: Pulmonary effort is normal. No tachypnea, bradypnea, accessory muscle usage or respiratory distress. She is not intubated.      Breath sounds: Normal  "breath sounds. No stridor.     Musculoskeletal:         General: Normal range of motion.      Cervical back: Normal range of motion and neck supple.     Skin:     General: Skin is warm and dry.      Capillary Refill: Capillary refill takes less than 2 seconds.     Neurological:      General: No focal deficit present.      Mental Status: She is alert and oriented to person, place, and time.     Psychiatric:         Mood and Affect: Mood normal.         Behavior: Behavior normal.         Portions of the record may have been created with voice recognition software.  Occasional wrong word or \"sound a like\" substitutions may have occurred due to the inherent limitations of voice recognition software.  Read the chart carefully and recognize, using context, where substitutions have occurred.       [1]   Past Medical History:  Diagnosis Date    Alcohol abuse 1/4/2024    Bipolar disorder (HCC)     Diabetes mellitus (HCC)     Hypertension     Psychiatric disorder    [2] No past surgical history on file.  [3]   Family History  Problem Relation Name Age of Onset    Diabetes Mother      Diabetes Brother     [4]   Allergies  Allergen Reactions    Bee Pollen Swelling    Pollen Extract Eye Swelling     "

## 2025-07-31 ENCOUNTER — VBI (OUTPATIENT)
Dept: ADMINISTRATIVE | Facility: OTHER | Age: 50
End: 2025-07-31

## 2025-08-01 DIAGNOSIS — J45.909 ASTHMA, UNSPECIFIED ASTHMA SEVERITY, UNSPECIFIED WHETHER COMPLICATED, UNSPECIFIED WHETHER PERSISTENT: ICD-10-CM

## 2025-08-01 RX ORDER — ALBUTEROL SULFATE 90 UG/1
2 INHALANT RESPIRATORY (INHALATION) EVERY 6 HOURS PRN
Qty: 8.5 G | Refills: 0 | Status: SHIPPED | OUTPATIENT
Start: 2025-08-01

## 2025-08-21 ENCOUNTER — VBI (OUTPATIENT)
Dept: ADMINISTRATIVE | Facility: OTHER | Age: 50
End: 2025-08-21